# Patient Record
Sex: FEMALE | Race: WHITE | NOT HISPANIC OR LATINO | Employment: OTHER | URBAN - METROPOLITAN AREA
[De-identification: names, ages, dates, MRNs, and addresses within clinical notes are randomized per-mention and may not be internally consistent; named-entity substitution may affect disease eponyms.]

---

## 2024-03-08 PROBLEM — T46.2X1A HYPOTHYROIDISM DUE TO AMIODARONE: Status: ACTIVE | Noted: 2023-08-08

## 2024-03-25 ENCOUNTER — OFFICE VISIT (OUTPATIENT)
Age: 89
End: 2024-03-25
Payer: MEDICARE

## 2024-03-25 VITALS
HEART RATE: 54 BPM | HEIGHT: 64 IN | SYSTOLIC BLOOD PRESSURE: 136 MMHG | BODY MASS INDEX: 25.78 KG/M2 | WEIGHT: 151 LBS | DIASTOLIC BLOOD PRESSURE: 64 MMHG

## 2024-03-25 DIAGNOSIS — I35.0 NONRHEUMATIC AORTIC VALVE STENOSIS: Primary | ICD-10-CM

## 2024-03-25 PROCEDURE — 1159F MED LIST DOCD IN RCRD: CPT | Performed by: INTERNAL MEDICINE

## 2024-03-25 PROCEDURE — 1160F RVW MEDS BY RX/DR IN RCRD: CPT | Performed by: INTERNAL MEDICINE

## 2024-03-25 PROCEDURE — 99215 OFFICE O/P EST HI 40 MIN: CPT | Performed by: INTERNAL MEDICINE

## 2024-03-25 NOTE — H&P (VIEW-ONLY)
[facility] New Patient Office Note     Encounter Date:24  Patient:Brunilda Antony  :1934  MRN:[id number]    Referring Provider: Aaron Salinas MD    Consulted for: Aortic stenosis    Chief Complaint:   Chief Complaint   Patient presents with    TAVR Consult     History of Presenting Illness:      Ms. nAtony is a 89 y.o. woman with past medical history notable for nonrheumatic aortic valve stenosis, chronic systolic and diastolic congestive heart failure, paroxysmal atrial fibrillation not on anticoagulation due to bleeding risk, mixed hyperlipidemia, and pulmonary hypertension who presents to our office for initial valuation regarding her nonrheumatic aortic valve stenosis.  She has been followed closely for her valvular heart disease over the last number of years.  She noted to have moderate to severe aortic stenosis about a year ago but clinically was doing fairly well and actually had improvement in her LV function after addressing her heart rhythm issues.  Unfortunately over the last few months she has been having some progression of her congestive heart failure symptoms and did have to start on diuretic therapy.  She was having severe leg swelling and shortness of breath fortunately with just taking as needed Lasix she is doing better although still has fair amount of leg swelling.  Repeat echocardiogram did show progression of her valvular heart disease from moderate to severe to now clearly severe along with progression of pulmonary hypertension      Review of Systems:  Review of Systems   Constitutional: Positive for malaise/fatigue.   Cardiovascular:  Positive for chest pain, dyspnea on exertion and leg swelling.   Respiratory:  Positive for shortness of breath.        Current Outpatient Medications on File Prior to Visit   Medication Sig Dispense Refill    acetaminophen (TYLENOL) 325 MG tablet Take 2 tablets by mouth Every 4 (Four) Hours As Needed for Mild Pain. 30 tablet 0     amiodarone (PACERONE) 200 MG tablet Take 1 tablet by mouth Daily. 30 tablet 5    amLODIPine (NORVASC) 2.5 MG tablet Take 1 tablet by mouth Daily. 30 tablet 5    Arginine 500 MG capsule Take  by mouth.      bisacodyl (DULCOLAX) 10 MG suppository Insert 1 suppository into the rectum Daily As Needed for Constipation.      Blood Glucose Monitoring Suppl (ACCU-CHEK HU PLUS) w/Device kit Use to check glucose once daily . DM2/E11.9 1 kit 0    castor oil-balsam peru (VENELEX) ointment Apply 1 Application topically to the appropriate area as directed 2 (Two) Times a Day.      empagliflozin (Jardiance) 10 MG tablet tablet Take 1 tablet by mouth Daily. 30 tablet 11    famotidine (PEPCID) 10 MG tablet Take 1 tablet by mouth 2 (Two) Times a Day As Needed for Heartburn.      ferrous gluconate (FERGON) 324 MG tablet Take 1 tablet by mouth 5(five) times a week with breakfast. 20 tablet 5    glucose blood (Accu-Chek Hu Plus) test strip Use to check glucose once daily . DM/ e11.9 100 each 11    insulin lispro (HUMALOG/ADMELOG) 100 UNIT/ML injection Inject 2-7 Units under the skin into the appropriate area as directed 4 (Four) Times a Day Before Meals & at Bedtime.      Lancets (ACCU-CHEK MULTICLIX) lancets Use to check glucose once daily .  DM/ e11.9 100 each 12    levothyroxine (SYNTHROID, LEVOTHROID) 88 MCG tablet Take 1 tablet by mouth Every Morning. 30 tablet 3    losartan (COZAAR) 25 MG tablet Take 1 tablet by mouth 2 (Two) Times a Day. 60 tablet 5    metoprolol succinate XL (TOPROL-XL) 25 MG 24 hr tablet Take 0.5 tablets by mouth Daily. 15 tablet 5    multivitamin with minerals tablet tablet Take 1 tablet by mouth Daily.      ondansetron ODT (ZOFRAN-ODT) 4 MG disintegrating tablet Place 1 tablet on the tongue Every 8 (Eight) Hours As Needed for Nausea or Vomiting.      pantoprazole (PROTONIX) 40 MG EC tablet Take 1 tablet by mouth 2 (Two) Times a Day Before Meals. 60 tablet 5    rOPINIRole (REQUIP) 0.25 MG tablet Take 1  tablet by mouth Every Night. Take 1 hour before bedtime. 30 tablet 5    rosuvastatin (CRESTOR) 10 MG tablet Take 1 tablet by mouth Every Night. 30 tablet 5    vitamin B-12 (CYANOCOBALAMIN) 1000 MCG tablet Take 1 tablet by mouth Daily. 30 tablet 5    vitamin D (ERGOCALCIFEROL) 1.25 MG (59971 UT) capsule capsule Take 1 capsule by mouth 1 (One) Time Per Week. 5 capsule 3     No current facility-administered medications on file prior to visit.       Allergies   Allergen Reactions    Atorvastatin Calcium Myalgia    Digoxin Other (See Comments)     Digoxin toxicity ?unknown cause       Past Medical History:   Diagnosis Date    Arthritis     Atrial fibrillation     CHF (congestive heart failure)     Diabetes mellitus     Disease of thyroid gland     Heart valve disease 8-1-2023    Hyperlipidemia     Hypertension     Multiple falls     Open wound     left lower leg and top of left foot    Orthopnea     Pneumonia of right upper lobe due to infectious organism 12/16/2022    Restless legs syndrome 05/12/2016    Syncope     Wound infection     onm IVAB       Past Surgical History:   Procedure Laterality Date    ANKLE OPEN REDUCTION INTERNAL FIXATION Left 08/27/2023    Procedure: LEFT ANKLE OPEN REDUCTION INTERNAL FIXATION;  Surgeon: Tonny Walter II, MD;  Location: [facility] OR;  Service: Orthopedics;  Laterality: Left;    CATARACT EXTRACTION EXTRACAPSULAR W/ INTRAOCULAR LENS IMPLANTATION Bilateral     CHOLECYSTECTOMY WITH INTRAOPERATIVE CHOLANGIOGRAM N/A 08/11/2023    Procedure: CHOLECYSTECTOMY LAPAROSCOPIC INTRAOPERATIVE CHOLANGIOGRAM;  Surgeon: Lorena Olivares MD;  Location: [facility] OR;  Service: General;  Laterality: N/A;    COLONOSCOPY N/A 08/26/2023    Procedure: COLONOSCOPY TO CECUM/TI;  Surgeon: Juan M Mckoy MD;  Location: [facility] ENDOSCOPY;  Service: Gastroenterology;  Laterality: N/A;  pre: ANEMIA, GI BLEED  post: FAIR PREP, HEMORRHOIDS    ENDOSCOPY N/A 08/25/2023    Procedure:  ESOPHAGOGASTRODUODENOSCOPY;  Surgeon: Orlando Pang MD;  Location: [facility] ENDOSCOPY;  Service: Gastroenterology;  Laterality: N/A;  Pre - GI Bleed    ERCP N/A 2023    Procedure: ENDOSCOPIC RETROGRADE CHOLANGIOPANCREATOGRAPHY with sphincterotomy and balloon sweep;  Surgeon: Orlando Huang MD;  Location: [facility] ENDOSCOPY;  Service: Gastroenterology;  Laterality: N/A;  PRE/POST - cbd stones    HARDWARE REMOVAL Left 10/02/2023    Procedure: LEFT ANKLE HARDWARE REMOVAL;  Surgeon: Tonny Walter II, MD;  Location:  [facility] [place];  Service: Orthopedics;  Laterality: Left;    KNEE ARTHROSCOPY Right     SKIN CANCER EXCISION Left 2019    Left Malar       Social History     Socioeconomic History    Marital status:      Spouse name: Neel    Number of children: 4   Tobacco Use    Smoking status: Former     Current packs/day: 0.00     Types: Cigarettes     Start date: 1946     Quit date: 1961     Years since quittin.9    Smokeless tobacco: Never    Tobacco comments:     NO Caffeine use   Vaping Use    Vaping status: Never Used   Substance and Sexual Activity    Alcohol use: Yes     Comment: 3 yearly    Drug use: No    Sexual activity: Defer       Family History   Problem Relation Age of Onset    Hypertension Mother     Hypertension Father     Heart attack Father 55         at 55    Hyperlipidemia Father     Hyperlipidemia Sister     Hypertension Sister     Leukemia Sister     Coronary artery disease Brother         s/p cabg    Hypertension Brother     Breast cancer Daughter     Ovarian cancer Daughter     Colon cancer Neg Hx     Malig Hyperthermia Neg Hx        The following portions of the patient's history were reviewed and updated as appropriate: allergies, current medications, past family history, past medical history, past social history, past surgical history and problem list.       Objective:       Vitals:    24 0944   BP: 136/64   BP Location: Left arm   Patient  "Position: Sitting   Pulse: 54   Weight: 68.5 kg (151 lb)   Height: 162.6 cm (64.02\")       Body mass index is 25.9 kg/m².    Physical Exam:  Constitutional: Well appearing, Frail, No acute distress using walker to ambulate  HENT: Oropharynx clear and membrane moist  Eyes: Normal conjunctiva, no sclera icterus.  Neck: Supple, no carotid bruit bilaterally.  Cardiovascular: Regular rate and rhythm, Late peaking systolic murmur over the right upper sternal border, 1+ bilateral lower extremity edema.  Pulmonary: Normal respiratory effort, Normal lung sounds, no wheezing.  Neurological: Alert and orient x 3.   Skin: Warm, dry, no ecchymosis, no rash.  Psych: Appropriate mood and affect. Normal judgment and insight.      Lab Results   Component Value Date     02/26/2024     12/23/2023    K 3.8 02/26/2024    K 3.9 12/23/2023     02/26/2024    CL 99 12/23/2023    CO2 22.2 02/26/2024    CO2 27.2 12/23/2023    BUN 25 (H) 02/26/2024    BUN 42 (H) 12/23/2023    CREATININE 1.18 (H) 02/26/2024    CREATININE 0.90 12/23/2023    EGFRIFNONA 49 (L) 12/06/2021    EGFRIFNONA 52 (L) 09/09/2020    EGFRIFAFRI 57 (L) 12/06/2021    EGFRIFAFRI 62 09/09/2020    GLUCOSE 103 (H) 02/26/2024    GLUCOSE 163 (H) 12/23/2023    CALCIUM 8.5 (L) 02/26/2024    CALCIUM 8.5 (L) 12/23/2023    PROTENTOTREF 7.7 12/06/2021    PROTENTOTREF 6.5 09/09/2020    ALBUMIN 3.4 (L) 02/26/2024    ALBUMIN 2.8 (L) 12/22/2023    BILITOT 0.6 02/26/2024    BILITOT 0.5 12/22/2023    AST 18 02/26/2024    AST 16 12/22/2023    ALT 12 02/26/2024    ALT 12 12/22/2023     Lab Results   Component Value Date    WBC 7.83 02/26/2024    WBC 7.98 02/12/2024    HGB 9.1 (L) 02/26/2024    HGB 9.9 (L) 02/12/2024    HCT 29.5 (L) 02/26/2024    HCT 31.1 (L) 02/12/2024    MCV 85.8 02/26/2024    MCV 85.7 02/12/2024     02/26/2024     02/12/2024     Lab Results   Component Value Date    CHOL 157 06/20/2023    TRIG 114 06/20/2023    TRIG 75 12/06/2021    HDL 45 " 06/20/2023    HDL 49 12/06/2021    LDL 91 06/20/2023    LDL 50 12/06/2021     Lab Results   Component Value Date    PROBNP 1,486.0 02/26/2024    PROBNP 4,620.0 (H) 12/16/2023     Lab Results   Component Value Date    TROPONINT 45 (H) 12/16/2023     Lab Results   Component Value Date    TSH 1.800 02/26/2024    TSH 3.030 02/12/2024       ECG 12/16/2023 tracings reviewed by myself:  Sinus rhythm normal QRS    Echocardiogram 2/26/2024 with images reviewed by myself:  Left ventricular systolic function is normal. Calculated left ventricular EF = 65.3%  Left ventricular diastolic function is consistent with (grade III w/high LAP) reversible restrictive pattern.  The left atrial cavity is mildly dilated.  The right atrial cavity is mildly  dilated.  Severe aortic valve stenosis is present. Aortic valve area is 0.7 cm2.Peak velocity of the flow distal to the aortic valve is 414.6 cm/s. Aortic valve maximum pressure gradient is 69 mmHg. Aortic valve mean pressure gradient is 40 mmHg. Aortic valve dimensionless index is 0.3 .  Calculated right ventricular systolic pressure from tricuspid regurgitation is 66 mmHg.    Echocardiogram 12/18/2023 with images reviewed by myself:  Left ventricular systolic function is normal. Calculated left ventricular EF = 64.4%  Left ventricular diastolic function was normal.  The left atrial cavity is moderately dilated.  Moderate aortic valve stenosis is present. Aortic valve area is 0.6 cm2.  Peak velocity of the flow distal to the aortic valve is 341 cm/s. Aortic valve maximum pressure gradient is 47 mmHg. Aortic valve mean pressure gradient is 27 mmHg. Aortic valve dimensionless index is 0.3 .  Estimated right ventricular systolic pressure from tricuspid regurgitation is mildly elevated (35-45 mmHg). Calculated right ventricular systolic pressure from tricuspid regurgitation is 36 mmHg.    Echocardiogram 8/8/2023:  Left ventricular systolic function is normal. Calculated left ventricular EF  = 63.2%  Left ventricular diastolic function was normal.  There is calcification of the aortic valve mainly affecting the non-coronary, left coronary and right coronary cusp(s). There is thickening of the aortic valve. The aortic valve appears trileaflet. No significant aortic valve regurgitation is present. Moderate aortic valve stenosis is present. Aortic valve area is 0.87 cm2. Peak velocity of the flow distal to the aortic valve is 317.1 cm/s. Aortic valve maximum pressure gradient is 40.2 mmHg. Aortic valve mean  Calculated right ventricular systolic pressure from tricuspid regurgitation is 38 mmHg.    Echocardiogram 12/16/2022:  Left ventricular systolic function is low normal. Calculated left ventricular EF = 41.7% Left ventricular ejection fraction appears to be 41 - 45%. Normal left ventricular cavity size and wall thickness noted. There is left ventricular global hypokinesis noted. Left ventricular diastolic function was indeterminate. There is markedly abnormal septal motion that appears to correlate with respiration and suggests significant underlying lung disease vs diastolic volume overload.  The right ventricular cavity is moderately dilated. Mildly reduced right ventricular systolic function noted.  Moderate mitral valve regurgitation is present.  Moderate tricuspid valve regurgitation is present. Estimated right ventricular systolic pressure from tricuspid regurgitation is moderately elevated (45-55 mmHg). Calculated right ventricular systolic pressure from tricuspid regurgitation is 46 mmHg.        Assessment:          Diagnosis Plan   1. Nonrheumatic aortic valve stenosis  Case Request Cath Lab: Right and Left Heart Cath    CT Angio TAVR Chest Abdomen Pelvis    Ambulatory Referral to Cardiac Surgery             Plan:       Ms. Antony is a 89 y.o. woman with past medical history notable for nonrheumatic aortic valve stenosis, chronic systolic and diastolic congestive heart failure, paroxysmal atrial  fibrillation not on anticoagulation due to bleeding risk, mixed hyperlipidemia, and pulmonary hypertension who presents to our office for initial valuation regarding her nonrheumatic aortic valve stenosis.  She clearly has symptomatic aortic stenoses.  Fortunately with little diuretic her symptoms have been improving but based upon the progression seen on her echocardiogram more definitive answers would be aortic valve replacement.  Without treating her valve disease obviously symptomatic severe aortic stenosis is life-threatening.  Do need to proceed forward with further evaluation for aortic valve replacement given her age and comorbidities I think TAVR would likely be a better option we will also have our cardiac surgery colleagues weigh in.  Will start working on workup for TAVR with heart catheterization and TAVR CTA along with our referral to our surgical team.    Nonrheumatic aortic valve stenosis:  Plan on proceeding forward with TAVR workup with heart catheterization to help better define pulmonary hypertension and hemodynamics as well as CTA  Cardiac surgery referral  Continue as needed diuretics    Chronic systolic and diastolic congestive heart failure:  Seems to be stabilized with starting as needed Lasix  Continue with current medical regimen as directed by primary cardiologist    Pulmonary hypertension:  Likely related to underlying congestive heart failure but also possibly due to valvular heart disease continue diuretics      Follow-up:  After heart catheterization      Thank you for allowing me to participate in the care of Brunilda Antony. Feel free to contact me directly with any further questions or concerns.    Orlando Cash MD  [facility]  03/25/24  10:29 EDT         Shared decision Statement:  Aortic Stenosis education material has been provided to the patient. This included a printed brochure detailing pathophysiology of aortic stenosis, patient specific aortic stenosis  symptoms, and treatment options (medical therapy, surgical aortic valve replacement, and transcatheter aortic valve replacement).   We reviewed the Shared Decision Making Tool for treatment of Aortic Stenosis to compare/contrast the options of TAVR/SAVR/medical management. We discussed patient's goals of care:  Feel better. Patient has expressed following concerns regarding Aortic Stenosis and/or treatment options: Easy recovery  Patient has a Living Will: yes  Patient has a Power of : yes

## 2024-03-25 NOTE — PROGRESS NOTES
[facility] New Patient Office Note     Encounter Date:24  Patient:Brunilda Antony  :1934  MRN:[id number]    Referring Provider: Aaron Salinas MD    Consulted for: Aortic stenosis    Chief Complaint:   Chief Complaint   Patient presents with    TAVR Consult     History of Presenting Illness:      Ms. Antony is a 89 y.o. woman with past medical history notable for nonrheumatic aortic valve stenosis, chronic systolic and diastolic congestive heart failure, paroxysmal atrial fibrillation not on anticoagulation due to bleeding risk, mixed hyperlipidemia, and pulmonary hypertension who presents to our office for initial valuation regarding her nonrheumatic aortic valve stenosis.  She has been followed closely for her valvular heart disease over the last number of years.  She noted to have moderate to severe aortic stenosis about a year ago but clinically was doing fairly well and actually had improvement in her LV function after addressing her heart rhythm issues.  Unfortunately over the last few months she has been having some progression of her congestive heart failure symptoms and did have to start on diuretic therapy.  She was having severe leg swelling and shortness of breath fortunately with just taking as needed Lasix she is doing better although still has fair amount of leg swelling.  Repeat echocardiogram did show progression of her valvular heart disease from moderate to severe to now clearly severe along with progression of pulmonary hypertension      Review of Systems:  Review of Systems   Constitutional: Positive for malaise/fatigue.   Cardiovascular:  Positive for chest pain, dyspnea on exertion and leg swelling.   Respiratory:  Positive for shortness of breath.        Current Outpatient Medications on File Prior to Visit   Medication Sig Dispense Refill    acetaminophen (TYLENOL) 325 MG tablet Take 2 tablets by mouth Every 4 (Four) Hours As Needed for Mild Pain. 30 tablet 0     amiodarone (PACERONE) 200 MG tablet Take 1 tablet by mouth Daily. 30 tablet 5    amLODIPine (NORVASC) 2.5 MG tablet Take 1 tablet by mouth Daily. 30 tablet 5    Arginine 500 MG capsule Take  by mouth.      bisacodyl (DULCOLAX) 10 MG suppository Insert 1 suppository into the rectum Daily As Needed for Constipation.      Blood Glucose Monitoring Suppl (ACCU-CHEK HU PLUS) w/Device kit Use to check glucose once daily . DM2/E11.9 1 kit 0    castor oil-balsam peru (VENELEX) ointment Apply 1 Application topically to the appropriate area as directed 2 (Two) Times a Day.      empagliflozin (Jardiance) 10 MG tablet tablet Take 1 tablet by mouth Daily. 30 tablet 11    famotidine (PEPCID) 10 MG tablet Take 1 tablet by mouth 2 (Two) Times a Day As Needed for Heartburn.      ferrous gluconate (FERGON) 324 MG tablet Take 1 tablet by mouth 5(five) times a week with breakfast. 20 tablet 5    glucose blood (Accu-Chek Hu Plus) test strip Use to check glucose once daily . DM/ e11.9 100 each 11    insulin lispro (HUMALOG/ADMELOG) 100 UNIT/ML injection Inject 2-7 Units under the skin into the appropriate area as directed 4 (Four) Times a Day Before Meals & at Bedtime.      Lancets (ACCU-CHEK MULTICLIX) lancets Use to check glucose once daily .  DM/ e11.9 100 each 12    levothyroxine (SYNTHROID, LEVOTHROID) 88 MCG tablet Take 1 tablet by mouth Every Morning. 30 tablet 3    losartan (COZAAR) 25 MG tablet Take 1 tablet by mouth 2 (Two) Times a Day. 60 tablet 5    metoprolol succinate XL (TOPROL-XL) 25 MG 24 hr tablet Take 0.5 tablets by mouth Daily. 15 tablet 5    multivitamin with minerals tablet tablet Take 1 tablet by mouth Daily.      ondansetron ODT (ZOFRAN-ODT) 4 MG disintegrating tablet Place 1 tablet on the tongue Every 8 (Eight) Hours As Needed for Nausea or Vomiting.      pantoprazole (PROTONIX) 40 MG EC tablet Take 1 tablet by mouth 2 (Two) Times a Day Before Meals. 60 tablet 5    rOPINIRole (REQUIP) 0.25 MG tablet Take 1  tablet by mouth Every Night. Take 1 hour before bedtime. 30 tablet 5    rosuvastatin (CRESTOR) 10 MG tablet Take 1 tablet by mouth Every Night. 30 tablet 5    vitamin B-12 (CYANOCOBALAMIN) 1000 MCG tablet Take 1 tablet by mouth Daily. 30 tablet 5    vitamin D (ERGOCALCIFEROL) 1.25 MG (35956 UT) capsule capsule Take 1 capsule by mouth 1 (One) Time Per Week. 5 capsule 3     No current facility-administered medications on file prior to visit.       Allergies   Allergen Reactions    Atorvastatin Calcium Myalgia    Digoxin Other (See Comments)     Digoxin toxicity ?unknown cause       Past Medical History:   Diagnosis Date    Arthritis     Atrial fibrillation     CHF (congestive heart failure)     Diabetes mellitus     Disease of thyroid gland     Heart valve disease 8-1-2023    Hyperlipidemia     Hypertension     Multiple falls     Open wound     left lower leg and top of left foot    Orthopnea     Pneumonia of right upper lobe due to infectious organism 12/16/2022    Restless legs syndrome 05/12/2016    Syncope     Wound infection     onm IVAB       Past Surgical History:   Procedure Laterality Date    ANKLE OPEN REDUCTION INTERNAL FIXATION Left 08/27/2023    Procedure: LEFT ANKLE OPEN REDUCTION INTERNAL FIXATION;  Surgeon: Tonny Walter II, MD;  Location: [facility] OR;  Service: Orthopedics;  Laterality: Left;    CATARACT EXTRACTION EXTRACAPSULAR W/ INTRAOCULAR LENS IMPLANTATION Bilateral     CHOLECYSTECTOMY WITH INTRAOPERATIVE CHOLANGIOGRAM N/A 08/11/2023    Procedure: CHOLECYSTECTOMY LAPAROSCOPIC INTRAOPERATIVE CHOLANGIOGRAM;  Surgeon: Lorena Olivares MD;  Location: [facility] OR;  Service: General;  Laterality: N/A;    COLONOSCOPY N/A 08/26/2023    Procedure: COLONOSCOPY TO CECUM/TI;  Surgeon: Juan M Mckoy MD;  Location: [facility] ENDOSCOPY;  Service: Gastroenterology;  Laterality: N/A;  pre: ANEMIA, GI BLEED  post: FAIR PREP, HEMORRHOIDS    ENDOSCOPY N/A 08/25/2023    Procedure:  ESOPHAGOGASTRODUODENOSCOPY;  Surgeon: Orlando Pang MD;  Location: [facility] ENDOSCOPY;  Service: Gastroenterology;  Laterality: N/A;  Pre - GI Bleed    ERCP N/A 2023    Procedure: ENDOSCOPIC RETROGRADE CHOLANGIOPANCREATOGRAPHY with sphincterotomy and balloon sweep;  Surgeon: Orlando Huang MD;  Location: [facility] ENDOSCOPY;  Service: Gastroenterology;  Laterality: N/A;  PRE/POST - cbd stones    HARDWARE REMOVAL Left 10/02/2023    Procedure: LEFT ANKLE HARDWARE REMOVAL;  Surgeon: Tonny Walter II, MD;  [place]  [facility];  Service: Orthopedics;  Laterality: Left;    KNEE ARTHROSCOPY Right     SKIN CANCER EXCISION Left 2019    Left Malar       Social History     Socioeconomic History    Marital status:      Spouse name: Neel    Number of children: 4   Tobacco Use    Smoking status: Former     Current packs/day: 0.00     Types: Cigarettes     Start date: 1946     Quit date: 1961     Years since quittin.9    Smokeless tobacco: Never    Tobacco comments:     NO Caffeine use   Vaping Use    Vaping status: Never Used   Substance and Sexual Activity    Alcohol use: Yes     Comment: 3 yearly    Drug use: No    Sexual activity: Defer       Family History   Problem Relation Age of Onset    Hypertension Mother     Hypertension Father     Heart attack Father 55         at 55    Hyperlipidemia Father     Hyperlipidemia Sister     Hypertension Sister     Leukemia Sister     Coronary artery disease Brother         s/p cabg    Hypertension Brother     Breast cancer Daughter     Ovarian cancer Daughter     Colon cancer Neg Hx     Malig Hyperthermia Neg Hx        The following portions of the patient's history were reviewed and updated as appropriate: allergies, current medications, past family history, past medical history, past social history, past surgical history and problem list.       Objective:       Vitals:    24 0944   BP: 136/64   BP Location: Left arm   Patient  "Position: Sitting   Pulse: 54   Weight: 68.5 kg (151 lb)   Height: 162.6 cm (64.02\")       Body mass index is 25.9 kg/m².    Physical Exam:  Constitutional: Well appearing, Frail, No acute distress using walker to ambulate  HENT: Oropharynx clear and membrane moist  Eyes: Normal conjunctiva, no sclera icterus.  Neck: Supple, no carotid bruit bilaterally.  Cardiovascular: Regular rate and rhythm, Late peaking systolic murmur over the right upper sternal border, 1+ bilateral lower extremity edema.  Pulmonary: Normal respiratory effort, Normal lung sounds, no wheezing.  Neurological: Alert and orient x 3.   Skin: Warm, dry, no ecchymosis, no rash.  Psych: Appropriate mood and affect. Normal judgment and insight.      Lab Results   Component Value Date     02/26/2024     12/23/2023    K 3.8 02/26/2024    K 3.9 12/23/2023     02/26/2024    CL 99 12/23/2023    CO2 22.2 02/26/2024    CO2 27.2 12/23/2023    BUN 25 (H) 02/26/2024    BUN 42 (H) 12/23/2023    CREATININE 1.18 (H) 02/26/2024    CREATININE 0.90 12/23/2023    EGFRIFNONA 49 (L) 12/06/2021    EGFRIFNONA 52 (L) 09/09/2020    EGFRIFAFRI 57 (L) 12/06/2021    EGFRIFAFRI 62 09/09/2020    GLUCOSE 103 (H) 02/26/2024    GLUCOSE 163 (H) 12/23/2023    CALCIUM 8.5 (L) 02/26/2024    CALCIUM 8.5 (L) 12/23/2023    PROTENTOTREF 7.7 12/06/2021    PROTENTOTREF 6.5 09/09/2020    ALBUMIN 3.4 (L) 02/26/2024    ALBUMIN 2.8 (L) 12/22/2023    BILITOT 0.6 02/26/2024    BILITOT 0.5 12/22/2023    AST 18 02/26/2024    AST 16 12/22/2023    ALT 12 02/26/2024    ALT 12 12/22/2023     Lab Results   Component Value Date    WBC 7.83 02/26/2024    WBC 7.98 02/12/2024    HGB 9.1 (L) 02/26/2024    HGB 9.9 (L) 02/12/2024    HCT 29.5 (L) 02/26/2024    HCT 31.1 (L) 02/12/2024    MCV 85.8 02/26/2024    MCV 85.7 02/12/2024     02/26/2024     02/12/2024     Lab Results   Component Value Date    CHOL 157 06/20/2023    TRIG 114 06/20/2023    TRIG 75 12/06/2021    HDL 45 " 06/20/2023    HDL 49 12/06/2021    LDL 91 06/20/2023    LDL 50 12/06/2021     Lab Results   Component Value Date    PROBNP 1,486.0 02/26/2024    PROBNP 4,620.0 (H) 12/16/2023     Lab Results   Component Value Date    TROPONINT 45 (H) 12/16/2023     Lab Results   Component Value Date    TSH 1.800 02/26/2024    TSH 3.030 02/12/2024       ECG 12/16/2023 tracings reviewed by myself:  Sinus rhythm normal QRS    Echocardiogram 2/26/2024 with images reviewed by myself:  Left ventricular systolic function is normal. Calculated left ventricular EF = 65.3%  Left ventricular diastolic function is consistent with (grade III w/high LAP) reversible restrictive pattern.  The left atrial cavity is mildly dilated.  The right atrial cavity is mildly  dilated.  Severe aortic valve stenosis is present. Aortic valve area is 0.7 cm2.Peak velocity of the flow distal to the aortic valve is 414.6 cm/s. Aortic valve maximum pressure gradient is 69 mmHg. Aortic valve mean pressure gradient is 40 mmHg. Aortic valve dimensionless index is 0.3 .  Calculated right ventricular systolic pressure from tricuspid regurgitation is 66 mmHg.    Echocardiogram 12/18/2023 with images reviewed by myself:  Left ventricular systolic function is normal. Calculated left ventricular EF = 64.4%  Left ventricular diastolic function was normal.  The left atrial cavity is moderately dilated.  Moderate aortic valve stenosis is present. Aortic valve area is 0.6 cm2.  Peak velocity of the flow distal to the aortic valve is 341 cm/s. Aortic valve maximum pressure gradient is 47 mmHg. Aortic valve mean pressure gradient is 27 mmHg. Aortic valve dimensionless index is 0.3 .  Estimated right ventricular systolic pressure from tricuspid regurgitation is mildly elevated (35-45 mmHg). Calculated right ventricular systolic pressure from tricuspid regurgitation is 36 mmHg.    Echocardiogram 8/8/2023:  Left ventricular systolic function is normal. Calculated left ventricular EF  = 63.2%  Left ventricular diastolic function was normal.  There is calcification of the aortic valve mainly affecting the non-coronary, left coronary and right coronary cusp(s). There is thickening of the aortic valve. The aortic valve appears trileaflet. No significant aortic valve regurgitation is present. Moderate aortic valve stenosis is present. Aortic valve area is 0.87 cm2. Peak velocity of the flow distal to the aortic valve is 317.1 cm/s. Aortic valve maximum pressure gradient is 40.2 mmHg. Aortic valve mean  Calculated right ventricular systolic pressure from tricuspid regurgitation is 38 mmHg.    Echocardiogram 12/16/2022:  Left ventricular systolic function is low normal. Calculated left ventricular EF = 41.7% Left ventricular ejection fraction appears to be 41 - 45%. Normal left ventricular cavity size and wall thickness noted. There is left ventricular global hypokinesis noted. Left ventricular diastolic function was indeterminate. There is markedly abnormal septal motion that appears to correlate with respiration and suggests significant underlying lung disease vs diastolic volume overload.  The right ventricular cavity is moderately dilated. Mildly reduced right ventricular systolic function noted.  Moderate mitral valve regurgitation is present.  Moderate tricuspid valve regurgitation is present. Estimated right ventricular systolic pressure from tricuspid regurgitation is moderately elevated (45-55 mmHg). Calculated right ventricular systolic pressure from tricuspid regurgitation is 46 mmHg.        Assessment:          Diagnosis Plan   1. Nonrheumatic aortic valve stenosis  Case Request Cath Lab: Right and Left Heart Cath    CT Angio TAVR Chest Abdomen Pelvis    Ambulatory Referral to Cardiac Surgery             Plan:       Ms. Antony is a 89 y.o. woman with past medical history notable for nonrheumatic aortic valve stenosis, chronic systolic and diastolic congestive heart failure, paroxysmal atrial  fibrillation not on anticoagulation due to bleeding risk, mixed hyperlipidemia, and pulmonary hypertension who presents to our office for initial valuation regarding her nonrheumatic aortic valve stenosis.  She clearly has symptomatic aortic stenoses.  Fortunately with little diuretic her symptoms have been improving but based upon the progression seen on her echocardiogram more definitive answers would be aortic valve replacement.  Without treating her valve disease obviously symptomatic severe aortic stenosis is life-threatening.  Do need to proceed forward with further evaluation for aortic valve replacement given her age and comorbidities I think TAVR would likely be a better option we will also have our cardiac surgery colleagues weigh in.  Will start working on workup for TAVR with heart catheterization and TAVR CTA along with our referral to our surgical team.    Nonrheumatic aortic valve stenosis:  Plan on proceeding forward with TAVR workup with heart catheterization to help better define pulmonary hypertension and hemodynamics as well as CTA  Cardiac surgery referral  Continue as needed diuretics    Chronic systolic and diastolic congestive heart failure:  Seems to be stabilized with starting as needed Lasix  Continue with current medical regimen as directed by primary cardiologist    Pulmonary hypertension:  Likely related to underlying congestive heart failure but also possibly due to valvular heart disease continue diuretics      Follow-up:  After heart catheterization      Thank you for allowing me to participate in the care of Brunilda Antony. Feel free to contact me directly with any further questions or concerns.    Orlando Cash MD  [facility]  03/25/24  10:29 EDT         Shared decision Statement:  Aortic Stenosis education material has been provided to the patient. This included a printed brochure detailing pathophysiology of aortic stenosis, patient specific aortic stenosis  symptoms, and treatment options (medical therapy, surgical aortic valve replacement, and transcatheter aortic valve replacement).   We reviewed the Shared Decision Making Tool for treatment of Aortic Stenosis to compare/contrast the options of TAVR/SAVR/medical management. We discussed patient's goals of care:  Feel better. Patient has expressed following concerns regarding Aortic Stenosis and/or treatment options: Easy recovery  Patient has a Living Will: yes  Patient has a Power of : yes

## 2024-03-29 ENCOUNTER — HOSPITAL ENCOUNTER (OUTPATIENT)
Facility: HOSPITAL | Age: 89
Setting detail: HOSPITAL OUTPATIENT SURGERY
Discharge: HOME | End: 2024-03-29
Attending: INTERNAL MEDICINE | Admitting: INTERNAL MEDICINE
Payer: MEDICARE

## 2024-03-29 VITALS
SYSTOLIC BLOOD PRESSURE: 119 MMHG | TEMPERATURE: 97 F | WEIGHT: 150.3 LBS | HEIGHT: 64 IN | BODY MASS INDEX: 25.66 KG/M2 | RESPIRATION RATE: 16 BRPM | DIASTOLIC BLOOD PRESSURE: 62 MMHG | HEART RATE: 52 BPM | OXYGEN SATURATION: 96 %

## 2024-03-29 DIAGNOSIS — I35.0 NONRHEUMATIC AORTIC VALVE STENOSIS: ICD-10-CM

## 2024-03-29 LAB
GLUCOSE BLDC GLUCOMTR-MCNC: 84 MG/DL (ref 70–130)
GLUCOSE BLDC GLUCOMTR-MCNC: 91 MG/DL (ref 70–130)

## 2024-03-29 PROCEDURE — 82810 BLOOD GASES O2 SAT ONLY: CPT

## 2024-03-29 PROCEDURE — 25810000003 SODIUM CHLORIDE 0.9 % SOLUTION: Performed by: INTERNAL MEDICINE

## 2024-03-29 PROCEDURE — C1894 INTRO/SHEATH, NON-LASER: HCPCS | Performed by: INTERNAL MEDICINE

## 2024-03-29 PROCEDURE — 82948 REAGENT STRIP/BLOOD GLUCOSE: CPT

## 2024-03-29 PROCEDURE — 85018 HEMOGLOBIN: CPT

## 2024-03-29 PROCEDURE — C1769 GUIDE WIRE: HCPCS | Performed by: INTERNAL MEDICINE

## 2024-03-29 PROCEDURE — 85014 HEMATOCRIT: CPT

## 2024-03-29 PROCEDURE — 25010000002 MIDAZOLAM PER 1 MG: Performed by: INTERNAL MEDICINE

## 2024-03-29 PROCEDURE — 25510000001 IOPAMIDOL PER 1 ML: Performed by: INTERNAL MEDICINE

## 2024-03-29 PROCEDURE — 93460 R&L HRT ART/VENTRICLE ANGIO: CPT | Performed by: INTERNAL MEDICINE

## 2024-03-29 RX ORDER — SODIUM CHLORIDE 9 MG/ML
75 INJECTION, SOLUTION INTRAVENOUS CONTINUOUS
Status: DISCONTINUED | OUTPATIENT
Start: 2024-03-29 | End: 2024-04-02 | Stop reason: HOSPADM

## 2024-03-29 RX ORDER — SODIUM CHLORIDE 9 MG/ML
40 INJECTION, SOLUTION INTRAVENOUS AS NEEDED
Status: DISCONTINUED | OUTPATIENT
Start: 2024-03-29 | End: 2024-04-02 | Stop reason: HOSPADM

## 2024-03-29 RX ORDER — LIDOCAINE HYDROCHLORIDE 20 MG/ML
INJECTION, SOLUTION INFILTRATION; PERINEURAL
Status: DISCONTINUED | OUTPATIENT
Start: 2024-03-29 | End: 2024-03-29 | Stop reason: HOSPADM

## 2024-03-29 RX ORDER — SODIUM CHLORIDE 9 MG/ML
40 INJECTION, SOLUTION INTRAVENOUS AS NEEDED
Status: DISCONTINUED | OUTPATIENT
Start: 2024-03-29 | End: 2024-03-29 | Stop reason: HOSPADM

## 2024-03-29 RX ORDER — SODIUM CHLORIDE 9 MG/ML
100 INJECTION, SOLUTION INTRAVENOUS CONTINUOUS
Status: ACTIVE | OUTPATIENT
Start: 2024-03-29 | End: 2024-03-29

## 2024-03-29 RX ORDER — ACETAMINOPHEN 325 MG/1
650 TABLET ORAL EVERY 4 HOURS PRN
Status: DISCONTINUED | OUTPATIENT
Start: 2024-03-29 | End: 2024-04-02 | Stop reason: HOSPADM

## 2024-03-29 RX ORDER — MIDAZOLAM HYDROCHLORIDE 1 MG/ML
INJECTION INTRAMUSCULAR; INTRAVENOUS
Status: DISCONTINUED | OUTPATIENT
Start: 2024-03-29 | End: 2024-03-29 | Stop reason: HOSPADM

## 2024-03-29 RX ORDER — SODIUM CHLORIDE 0.9 % (FLUSH) 0.9 %
10 SYRINGE (ML) INJECTION AS NEEDED
Status: DISCONTINUED | OUTPATIENT
Start: 2024-03-29 | End: 2024-03-29 | Stop reason: HOSPADM

## 2024-03-29 RX ORDER — SODIUM CHLORIDE 0.9 % (FLUSH) 0.9 %
10 SYRINGE (ML) INJECTION EVERY 12 HOURS SCHEDULED
Status: DISCONTINUED | OUTPATIENT
Start: 2024-03-29 | End: 2024-03-29 | Stop reason: HOSPADM

## 2024-03-29 RX ORDER — SODIUM CHLORIDE 0.9 % (FLUSH) 0.9 %
10 SYRINGE (ML) INJECTION AS NEEDED
Status: DISCONTINUED | OUTPATIENT
Start: 2024-03-29 | End: 2024-04-02 | Stop reason: HOSPADM

## 2024-03-29 RX ORDER — SODIUM CHLORIDE 0.9 % (FLUSH) 0.9 %
3 SYRINGE (ML) INJECTION EVERY 12 HOURS SCHEDULED
Status: DISCONTINUED | OUTPATIENT
Start: 2024-03-29 | End: 2024-04-02 | Stop reason: HOSPADM

## 2024-03-29 RX ADMIN — SODIUM CHLORIDE 75 ML/HR: 9 INJECTION, SOLUTION INTRAVENOUS at 09:57

## 2024-03-29 NOTE — DISCHARGE INSTRUCTIONS
[facility]  [address] [place] [address]    Coronary Angiogram (Radial/Ulnar Approach) After Care    Refer to this sheet in the next few weeks. These instructions provide you with information on caring for yourself after your procedure. Your caregiver may also give you more specific instructions. Your treatment has been planned according to current medical practices, but problems sometimes occur. Call your caregiver if you have any problems or questions after your procedure.    Home Care Instructions:  You may shower the day after the procedure. Remove the bandage (dressing) and gently wash the site with plain soap and water. Gently pat the site dry. You may apply a band aid daily for 2 days if desired.    Do not apply powder or lotion to the site.  Do not submerge the affected site in water for 3 to 5 days or until the site is completely healed.   Do not lift, push or pull anything over 5 pounds for 5 days after your procedure or as directed by your physician.  As a reference, a gallon of milk weighs 8 pounds.   Inspect the site at least twice daily. You may notice some bruising at the site and it may be tender for 1 to 2 weeks.     Increase your fluid intake for the next 2 days.    Keep arm elevated for 24 hours. For the remainder of the day, keep your arm in “Pledge of Allegiance” position when up and about.     You may drive 24 hours after the procedure unless otherwise instructed by your caregiver.  Do not operate machinery or power tools for 24 hours.  A responsible adult should be with you for the first 24 hours after you arrive home. Do not make any important legal decisions or sign legal papers for 24 hours.  Do not drink alcohol for 24 hours.    Metformin or any medications containing Metformin should not be taken for 48 hours after your procedure.      Call Your Doctor if:   You have unusual pain at the radial/ulnar (wrist) site.  You have redness, warmth, swelling, or pain at the  radial/ulnar (wrist) site.  You have drainage (other than a small amount of blood on the dressing).  `You have chills or a fever > 101.  Your arm becomes pale or dark, cool, tingly, or numb.  You develop chest pain, shortness of breath, feel faint or pass out.    You have heavy bleeding from the site, hold pressure on the site for 20 minutes.  If the bleeding stops, apply a fresh bandage and call your cardiologist.  However, if you        continue to have bleeding, call 911 and continue to apply pressure to the site.   You have any symptoms of a stroke.  Remember BE FAST  B-balance. Sudden trouble walking or loss of balance.  E-eyes.  Sudden changes in how you see or a sudden onset of a very bad headache.   F-face. Sudden weakness or loss of feeling of the face or facial droop on one side.   A-arms Sudden weakness or numbness in one arm.  One arm drifts down if they are both held out in front of you. This happens suddenly and usually on one side of the body.   S-speech.  Sudden trouble speaking, slurred speech or trouble understanding what are saying.   T-time  Time to call emergency services.  Write down the symptoms and the time they started.

## 2024-03-29 NOTE — Clinical Note
Prepped: right groin, right brachial and Right Wrist. Prepped with: Hibiclens. The site was clipped. The patient was draped in a sterile fashion.

## 2024-03-29 NOTE — Clinical Note
Hemostasis started on the right brachial vein. Manual pressure applied to vessel. Manual pressure was held by . Manual pressure was held for 5 min. Hemostasis achieved successfully.

## 2024-04-02 LAB
HCT VFR BLDA CALC: 33 % (ref 38–51)
HCT VFR BLDA CALC: 33 % (ref 38–51)
HGB BLDA-MCNC: 11.2 G/DL (ref 12–17)
HGB BLDA-MCNC: 11.2 G/DL (ref 12–17)
SAO2 % BLDA: 69 % (ref 95–98)
SAO2 % BLDA: 93 % (ref 95–98)

## 2024-04-03 ENCOUNTER — OFFICE VISIT (OUTPATIENT)
Dept: CARDIAC SURGERY | Facility: CLINIC | Age: 89
End: 2024-04-03
Payer: MEDICARE

## 2024-04-03 VITALS
WEIGHT: 148 LBS | OXYGEN SATURATION: 95 % | TEMPERATURE: 97.3 F | BODY MASS INDEX: 25.27 KG/M2 | DIASTOLIC BLOOD PRESSURE: 77 MMHG | RESPIRATION RATE: 18 BRPM | HEART RATE: 50 BPM | HEIGHT: 64 IN | SYSTOLIC BLOOD PRESSURE: 160 MMHG

## 2024-04-03 DIAGNOSIS — I35.0 SEVERE AORTIC VALVE STENOSIS: Primary | ICD-10-CM

## 2024-04-03 NOTE — PROGRESS NOTES
"Chief Complaint  Aortic Stenosis    Subjective        Briseida Antony presents to [facility] CARDIAC SURGERY  History of Present Illness  89 years old female with chronic systolic and diastolic congestive heart failure, paroxysmal atrial fibrillation not on any anticoagulation due to bleeding risk, hyperlipidemia, pulmonary hypertension, who comes for evaluation about her aortic valve stenosis.  She has shortness of breath with exertion without any chest pain or dizziness.  Over the last few months she has been having progression of her congestive heart failure symptoms with leg swelling and shortness of breath.  Last echocardiogram showed low-flow low gradient severe aortic valve stenosis with an aortic valve area of 0.6 cm2.  Cardiac cath showed single-vessel LAD disease.      Objective   Vital Signs:  /77 (BP Location: Left arm, Patient Position: Sitting, Cuff Size: Adult)   Pulse 50   Temp 97.3 °F (36.3 °C)   Resp 18   Ht 162.6 cm (64\")   Wt 67.1 kg (148 lb)   SpO2 95%   BMI 25.40 kg/m²   Estimated body mass index is 25.4 kg/m² as calculated from the following:    Height as of this encounter: 162.6 cm (64\").    Weight as of this encounter: 67.1 kg (148 lb).               Physical Exam  Constitutional:       Appearance: Normal appearance. She is normal weight.   Cardiovascular:      Rate and Rhythm: Normal rate and regular rhythm.      Heart sounds: Murmur heard.   Pulmonary:      Effort: Pulmonary effort is normal.      Breath sounds: Normal breath sounds.   Neurological:      General: No focal deficit present.      Mental Status: She is alert and oriented to person, place, and time. Mental status is at baseline.   Psychiatric:         Mood and Affect: Mood normal.         Behavior: Behavior normal.         Thought Content: Thought content normal.         Judgment: Judgment normal.        Result Review :                     Assessment and Plan     There are no diagnoses linked to this " encounter.    -Severe symptomatic aortic valve stenosis.  For TAVR  -Coronary disease.  Asymptomatic.  Medical treatment    89 years old female with severe symptomatic aortic valve stenosis.  I think that her aortic valve needs to be treated in order to improve symptoms and prolong life.  Due to age and comorbidities the patient is prohibitive risk for open heart surgery so I will recommend TAVR.  I explained risk and benefits of the procedure to the patient and family and she agreed to proceed.  She will be an open rescue if needed         Follow Up     No follow-ups on file.  Patient was given instructions and counseling regarding her condition or for health maintenance advice. Please see specific information pulled into the AVS if appropriate.

## 2024-04-03 NOTE — H&P (VIEW-ONLY)
"Chief Complaint  Aortic Stenosis    Subjective        Briseida Antony presents to [facility] CARDIAC SURGERY  History of Present Illness  89 years old female with chronic systolic and diastolic congestive heart failure, paroxysmal atrial fibrillation not on any anticoagulation due to bleeding risk, hyperlipidemia, pulmonary hypertension, who comes for evaluation about her aortic valve stenosis.  She has shortness of breath with exertion without any chest pain or dizziness.  Over the last few months she has been having progression of her congestive heart failure symptoms with leg swelling and shortness of breath.  Last echocardiogram showed low-flow low gradient severe aortic valve stenosis with an aortic valve area of 0.6 cm2.  Cardiac cath showed single-vessel LAD disease.      Objective   Vital Signs:  /77 (BP Location: Left arm, Patient Position: Sitting, Cuff Size: Adult)   Pulse 50   Temp 97.3 °F (36.3 °C)   Resp 18   Ht 162.6 cm (64\")   Wt 67.1 kg (148 lb)   SpO2 95%   BMI 25.40 kg/m²   Estimated body mass index is 25.4 kg/m² as calculated from the following:    Height as of this encounter: 162.6 cm (64\").    Weight as of this encounter: 67.1 kg (148 lb).               Physical Exam  Constitutional:       Appearance: Normal appearance. She is normal weight.   Cardiovascular:      Rate and Rhythm: Normal rate and regular rhythm.      Heart sounds: Murmur heard.   Pulmonary:      Effort: Pulmonary effort is normal.      Breath sounds: Normal breath sounds.   Neurological:      General: No focal deficit present.      Mental Status: She is alert and oriented to person, place, and time. Mental status is at baseline.   Psychiatric:         Mood and Affect: Mood normal.         Behavior: Behavior normal.         Thought Content: Thought content normal.         Judgment: Judgment normal.        Result Review :                     Assessment and Plan     There are no diagnoses linked to this " encounter.    -Severe symptomatic aortic valve stenosis.  For TAVR  -Coronary disease.  Asymptomatic.  Medical treatment    89 years old female with severe symptomatic aortic valve stenosis.  I think that her aortic valve needs to be treated in order to improve symptoms and prolong life.  Due to age and comorbidities the patient is prohibitive risk for open heart surgery so I will recommend TAVR.  I explained risk and benefits of the procedure to the patient and family and she agreed to proceed.  She will be an open rescue if needed         Follow Up     No follow-ups on file.  Patient was given instructions and counseling regarding her condition or for health maintenance advice. Please see specific information pulled into the AVS if appropriate.

## 2024-04-04 ENCOUNTER — HOSPITAL ENCOUNTER (OUTPATIENT)
Dept: CT IMAGING | Facility: HOSPITAL | Age: 89
Discharge: HOME | End: 2024-04-04
Admitting: INTERNAL MEDICINE
Payer: MEDICARE

## 2024-04-04 VITALS — HEART RATE: 50 BPM

## 2024-04-04 DIAGNOSIS — I35.0 NONRHEUMATIC AORTIC VALVE STENOSIS: ICD-10-CM

## 2024-04-04 LAB — CREAT BLDA-MCNC: 1.3 MG/DL (ref 0.6–1.3)

## 2024-04-04 PROCEDURE — 82565 ASSAY OF CREATININE: CPT

## 2024-04-04 PROCEDURE — 25510000001 IOPAMIDOL PER 1 ML: Performed by: INTERNAL MEDICINE

## 2024-04-04 PROCEDURE — 74174 CTA ABD&PLVS W/CONTRAST: CPT

## 2024-04-04 PROCEDURE — 71275 CT ANGIOGRAPHY CHEST: CPT

## 2024-04-04 RX ADMIN — IOPAMIDOL 95 ML: 755 INJECTION, SOLUTION INTRAVENOUS at 10:33

## 2024-04-08 ENCOUNTER — PREP FOR SURGERY (OUTPATIENT)
Dept: OTHER | Facility: HOSPITAL | Age: 89
End: 2024-04-08
Payer: MEDICARE

## 2024-04-08 DIAGNOSIS — R79.9 ABNORMAL FINDING OF BLOOD CHEMISTRY, UNSPECIFIED: ICD-10-CM

## 2024-04-08 DIAGNOSIS — I35.0 NONRHEUMATIC AORTIC VALVE STENOSIS: Primary | ICD-10-CM

## 2024-04-08 DIAGNOSIS — R79.1 ABNORMAL COAGULATION PROFILE: ICD-10-CM

## 2024-04-08 DIAGNOSIS — I11.0 HYPERTENSIVE HEART DISEASE WITH HEART FAILURE: ICD-10-CM

## 2024-04-08 DIAGNOSIS — I35.0 AORTIC VALVE STENOSIS, ETIOLOGY OF CARDIAC VALVE DISEASE UNSPECIFIED: Primary | ICD-10-CM

## 2024-04-08 RX ORDER — CHLORHEXIDINE GLUCONATE ORAL RINSE 1.2 MG/ML
15 SOLUTION DENTAL ONCE
OUTPATIENT
Start: 2024-04-08 | End: 2024-04-08

## 2024-04-08 RX ORDER — CHLORHEXIDINE GLUCONATE ORAL RINSE 1.2 MG/ML
15 SOLUTION DENTAL EVERY 12 HOURS
Status: CANCELLED | OUTPATIENT
Start: 2024-04-08 | End: 2024-04-09

## 2024-04-12 ENCOUNTER — HOSPITAL ENCOUNTER (OUTPATIENT)
Dept: GENERAL RADIOLOGY | Facility: HOSPITAL | Age: 89
Discharge: HOME | End: 2024-04-12
Payer: MEDICARE

## 2024-04-12 ENCOUNTER — DOCUMENTATION (OUTPATIENT)
Dept: CARDIOLOGY | Facility: HOSPITAL | Age: 89
End: 2024-04-12
Payer: MEDICARE

## 2024-04-12 ENCOUNTER — PRE-ADMISSION TESTING (OUTPATIENT)
Dept: PREADMISSION TESTING | Facility: HOSPITAL | Age: 89
End: 2024-04-12
Payer: MEDICARE

## 2024-04-12 VITALS
SYSTOLIC BLOOD PRESSURE: 170 MMHG | OXYGEN SATURATION: 98 % | RESPIRATION RATE: 20 BRPM | BODY MASS INDEX: 28.43 KG/M2 | DIASTOLIC BLOOD PRESSURE: 76 MMHG | HEART RATE: 52 BPM | HEIGHT: 61 IN | WEIGHT: 150.6 LBS | TEMPERATURE: 97.9 F

## 2024-04-12 DIAGNOSIS — I35.0 AORTIC VALVE STENOSIS, ETIOLOGY OF CARDIAC VALVE DISEASE UNSPECIFIED: ICD-10-CM

## 2024-04-12 LAB
ABO GROUP BLD: NORMAL
ALBUMIN SERPL-MCNC: 3.8 G/DL (ref 3.5–5.2)
ALBUMIN/GLOB SERPL: 1.2 G/DL
ALP SERPL-CCNC: 88 U/L (ref 39–117)
ALT SERPL W P-5'-P-CCNC: 15 U/L (ref 1–33)
ANION GAP SERPL CALCULATED.3IONS-SCNC: 10.4 MMOL/L (ref 5–15)
APTT PPP: 28.8 SECONDS (ref 22.7–35.4)
AST SERPL-CCNC: 19 U/L (ref 1–32)
BASOPHILS # BLD AUTO: 0.05 10*3/MM3 (ref 0–0.2)
BASOPHILS NFR BLD AUTO: 0.6 % (ref 0–1.5)
BILIRUB SERPL-MCNC: 0.5 MG/DL (ref 0–1.2)
BILIRUB UR QL STRIP: NEGATIVE
BLD GP AB SCN SERPL QL: NEGATIVE
BUN SERPL-MCNC: 21 MG/DL (ref 8–23)
BUN/CREAT SERPL: 26.6 (ref 7–25)
CALCIUM SPEC-SCNC: 9.3 MG/DL (ref 8.6–10.5)
CHLORIDE SERPL-SCNC: 104 MMOL/L (ref 98–107)
CLARITY UR: CLEAR
CLOSE TME COLL+ADP + EPINEP PNL BLD: 97 % (ref 86–100)
CO2 SERPL-SCNC: 23.6 MMOL/L (ref 22–29)
COLOR UR: YELLOW
CREAT SERPL-MCNC: 0.79 MG/DL (ref 0.57–1)
DEPRECATED RDW RBC AUTO: 46.2 FL (ref 37–54)
EGFRCR SERPLBLD CKD-EPI 2021: 71.6 ML/MIN/1.73
EOSINOPHIL # BLD AUTO: 0.24 10*3/MM3 (ref 0–0.4)
EOSINOPHIL NFR BLD AUTO: 3.1 % (ref 0.3–6.2)
ERYTHROCYTE [DISTWIDTH] IN BLOOD BY AUTOMATED COUNT: 14.6 % (ref 12.3–15.4)
GLOBULIN UR ELPH-MCNC: 3.1 GM/DL
GLUCOSE SERPL-MCNC: 100 MG/DL (ref 65–99)
GLUCOSE UR STRIP-MCNC: ABNORMAL MG/DL
HBA1C MFR BLD: 5.8 % (ref 4.8–5.6)
HCT VFR BLD AUTO: 35.8 % (ref 34–46.6)
HGB BLD-MCNC: 10.8 G/DL (ref 12–15.9)
HGB UR QL STRIP.AUTO: NEGATIVE
IMM GRANULOCYTES # BLD AUTO: 0.04 10*3/MM3 (ref 0–0.05)
IMM GRANULOCYTES NFR BLD AUTO: 0.5 % (ref 0–0.5)
INR PPP: 1.15 (ref 0.9–1.1)
KETONES UR QL STRIP: NEGATIVE
LEUKOCYTE ESTERASE UR QL STRIP.AUTO: NEGATIVE
LYMPHOCYTES # BLD AUTO: 1.42 10*3/MM3 (ref 0.7–3.1)
LYMPHOCYTES NFR BLD AUTO: 18.2 % (ref 19.6–45.3)
MCH RBC QN AUTO: 26 PG (ref 26.6–33)
MCHC RBC AUTO-ENTMCNC: 30.2 G/DL (ref 31.5–35.7)
MCV RBC AUTO: 86.3 FL (ref 79–97)
MONOCYTES # BLD AUTO: 0.82 10*3/MM3 (ref 0.1–0.9)
MONOCYTES NFR BLD AUTO: 10.5 % (ref 5–12)
NEUTROPHILS NFR BLD AUTO: 5.25 10*3/MM3 (ref 1.7–7)
NEUTROPHILS NFR BLD AUTO: 67.1 % (ref 42.7–76)
NITRITE UR QL STRIP: NEGATIVE
NRBC BLD AUTO-RTO: 0 /100 WBC (ref 0–0.2)
NT-PROBNP SERPL-MCNC: 942 PG/ML (ref 0–1800)
PH UR STRIP.AUTO: 5.5 [PH] (ref 5–8)
PLATELET # BLD AUTO: 308 10*3/MM3 (ref 140–450)
PMV BLD AUTO: 9.2 FL (ref 6–12)
POTASSIUM SERPL-SCNC: 4.4 MMOL/L (ref 3.5–5.2)
PROT SERPL-MCNC: 6.9 G/DL (ref 6–8.5)
PROT UR QL STRIP: NEGATIVE
PROTHROMBIN TIME: 14.9 SECONDS (ref 11.7–14.2)
QT INTERVAL: 484 MS
QTC INTERVAL: 459 MS
RBC # BLD AUTO: 4.15 10*6/MM3 (ref 3.77–5.28)
RH BLD: POSITIVE
SARS-COV-2 RNA RESP QL NAA+PROBE: NOT DETECTED
SODIUM SERPL-SCNC: 138 MMOL/L (ref 136–145)
SP GR UR STRIP: 1.01 (ref 1–1.03)
T&S EXPIRATION DATE: NORMAL
UROBILINOGEN UR QL STRIP: ABNORMAL
WBC NRBC COR # BLD AUTO: 7.82 10*3/MM3 (ref 3.4–10.8)

## 2024-04-12 PROCEDURE — 86901 BLOOD TYPING SEROLOGIC RH(D): CPT | Performed by: NURSE PRACTITIONER

## 2024-04-12 PROCEDURE — 93010 ELECTROCARDIOGRAM REPORT: CPT | Performed by: INTERNAL MEDICINE

## 2024-04-12 PROCEDURE — 86900 BLOOD TYPING SEROLOGIC ABO: CPT | Performed by: NURSE PRACTITIONER

## 2024-04-12 PROCEDURE — 83036 HEMOGLOBIN GLYCOSYLATED A1C: CPT | Performed by: NURSE PRACTITIONER

## 2024-04-12 PROCEDURE — 85730 THROMBOPLASTIN TIME PARTIAL: CPT | Performed by: NURSE PRACTITIONER

## 2024-04-12 PROCEDURE — 93005 ELECTROCARDIOGRAM TRACING: CPT

## 2024-04-12 PROCEDURE — 87635 SARS-COV-2 COVID-19 AMP PRB: CPT

## 2024-04-12 PROCEDURE — 71046 X-RAY EXAM CHEST 2 VIEWS: CPT

## 2024-04-12 PROCEDURE — 85610 PROTHROMBIN TIME: CPT | Performed by: NURSE PRACTITIONER

## 2024-04-12 PROCEDURE — 83880 ASSAY OF NATRIURETIC PEPTIDE: CPT | Performed by: NURSE PRACTITIONER

## 2024-04-12 PROCEDURE — 86850 RBC ANTIBODY SCREEN: CPT | Performed by: NURSE PRACTITIONER

## 2024-04-12 PROCEDURE — 81003 URINALYSIS AUTO W/O SCOPE: CPT | Performed by: NURSE PRACTITIONER

## 2024-04-12 PROCEDURE — 85576 BLOOD PLATELET AGGREGATION: CPT | Performed by: NURSE PRACTITIONER

## 2024-04-12 PROCEDURE — 85025 COMPLETE CBC W/AUTO DIFF WBC: CPT | Performed by: NURSE PRACTITIONER

## 2024-04-12 PROCEDURE — 80053 COMPREHEN METABOLIC PANEL: CPT | Performed by: NURSE PRACTITIONER

## 2024-04-12 RX ORDER — CHLORHEXIDINE GLUCONATE ORAL RINSE 1.2 MG/ML
15 SOLUTION DENTAL EVERY 12 HOURS
Status: DISPENSED | OUTPATIENT
Start: 2024-04-12 | End: 2024-04-13

## 2024-04-12 NOTE — PAT
I met with Brunilda & her daughter (Nicole) during the PAT appt for scheduled TAVR 4/16/24. She has been provided pre-op instructions as well as nasal & oral meds for TAVR prep. Pt lives at home, is independent of care, and does not have visiting home health care. She ambulates with a walker when outside of the home. She does not use any supplemental oxygen. She denies any skin rashes, irritations, or signs of infection; specifically in the groin area/access site. She was hospitalized in Dec 2023 for heart failure. PAT labs reviewed. I was able to answer Brunilda & Nicole's questions. They have the contact number to the coordinator's office if they have any further questions or concerns. They understand instructions and arrival 06:30 on 4/16/24. RTRN

## 2024-04-12 NOTE — DISCHARGE INSTRUCTIONS
Take the following medications the morning of surgery with a small sip of water:  NONE UNLESS DIRECTED BY TAVR COORDINATOR    HOLD LOSARTAN NIGHT BEFORE SURGERY    If you are on prescription narcotic pain medication to control your pain you may also take that medication the morning of surgery.    General Instructions:  Do not eat or drink anything after midnight the night before surgery.  Infants may have breast milk up to four hours before surgery.  Infants drinking formula may drink formula up to six hours before surgery.   Patients who avoid smoking, chewing tobacco and alcohol for 4 weeks prior to surgery have a reduced risk of post-operative complications.  Quit smoking as many days before surgery as you can.  Do not smoke, use chewing tobacco or drink alcohol the day of surgery.   If applicable bring your C-PAP/ BI-PAP machine in with you to preop day of surgery.  Bring any papers given to you in the doctor’s office.  Wear clean comfortable clothes.  Do not wear contact lenses, false eyelashes or make-up.  Bring a case for your glasses.   Bring crutches or walker if applicable.  Remove all piercings.  Leave jewelry and any other valuables at home.  Hair extensions with metal clips must be removed prior to surgery.  The Pre-Admission Testing nurse will instruct you to bring medications if unable to obtain an accurate list in Pre-Admission Testing.        If you were given a blood bank ID arm band remember to bring it with you the day of surgery.    Preventing a Surgical Site Infection:  For 2 to 3 days before surgery, avoid shaving with a razor because the razor can irritate skin and make it easier to develop an infection.    Any areas of open skin can increase the risk of a post-operative wound infection by allowing bacteria to enter and travel throughout the body.  Notify your surgeon if you have any skin wounds / rashes even if it is not near the expected surgical site.  The area will need assessed to  determine if surgery should be delayed until it is healed.  The night prior to surgery shower using a fresh bar of anti-bacterial soap (such as Dial) and clean washcloth.  Sleep in a clean bed with clean clothing.  Do not allow pets to sleep with you.  Shower on the morning of surgery using a fresh bar of anti-bacterial soap (such as Dial) and clean washcloth.  Dry with a clean towel and dress in clean clothing.  Ask your surgeon if you will be receiving antibiotics prior to surgery.  Make sure you, your family, and all healthcare providers clean their hands with soap and water or an alcohol based hand  before caring for you or your wound.    Day of surgery:  Your arrival time is approximately two hours before your scheduled surgery time.  Upon arrival, a Pre-op nurse and Anesthesiologist will review your health history, obtain vital signs, and answer questions you may have.  The only belongings needed at this time will be your home medications and if applicable your C-PAP/BI-PAP machine.  A Pre-op nurse will start an IV and you may receive medication in preparation for surgery, including something to help you relax.      Please be aware that surgery does come with discomfort.  We want to make every effort to control your discomfort so please discuss any uncontrolled symptoms with your nurse.   Your doctor will most likely have prescribed pain medications.      If you are going home after surgery you will receive individualized written care instructions before being discharged.  A responsible adult must drive you to and from the hospital on the day of your surgery and ideally stay with you through the night.  Discharge prescriptions can be filled by the hospital pharmacy during regular pharmacy hours.  If you are having surgery late in the day/evening your prescription may be e-prescribed to your pharmacy.  Please verify your pharmacy hours or chose a 24 hour pharmacy to avoid not having access to your  prescription because your pharmacy has closed for the day.    If you are staying overnight following surgery, you will be transported to your hospital room following the recovery period.  [facility] has all private rooms.    If you have any questions please call Pre-Admission Testing at [phone].  Deductibles and co-payments are collected on the day of service. Please be prepared to pay the required co-pay, deductible or deposit on the day of service as defined by your plan.    Call your surgeon immediately if you experience any of the following symptoms:  Sore Throat  Shortness of Breath or difficulty breathing  Cough  Chills  Body soreness or muscle pain  Headache  Fever  New loss of taste or smell  Do not arrive for your surgery ill.  Your procedure will need to be rescheduled to another time.  You will need to call your physician before the day of surgery to avoid any unnecessary exposure to hospital staff as well as other patients.    BACTROBAN NASAL OINTMENT  There are many germs normally in your nose. Bactroban is an ointment that will help reduce these germs. Please follow these instructions for Bactroban use:          __1ST__The day before surgery in the evening              Date____4/15____    ___2ND_The day of surgery in the morning    Date_____4/16___    **Squirt ½ package of Bactroban Ointment onto a cotton applicator and apply to inside of 1st nostril.  Squirt the remaining Bactroban and apply to the inside of the other nostril.    PERIDEX- ORAL:  Use only if your surgeon has ordered  Use the night before and morning of surgery - Swish, gargle, and spit - do not swallow.

## 2024-04-19 ENCOUNTER — HOSPITAL ENCOUNTER (INPATIENT)
Facility: HOSPITAL | Age: 89
LOS: 1 days | Discharge: HOME | End: 2024-04-20
Payer: MEDICARE

## 2024-04-19 ENCOUNTER — ANESTHESIA (OUTPATIENT)
Dept: PERIOP | Facility: HOSPITAL | Age: 89
End: 2024-04-19
Payer: MEDICARE

## 2024-04-19 ENCOUNTER — ANCILLARY PROCEDURE (OUTPATIENT)
Dept: PERIOP | Facility: HOSPITAL | Age: 89
End: 2024-04-19
Payer: MEDICARE

## 2024-04-19 ENCOUNTER — ANESTHESIA EVENT (OUTPATIENT)
Dept: PERIOP | Facility: HOSPITAL | Age: 89
End: 2024-04-19
Payer: MEDICARE

## 2024-04-19 DIAGNOSIS — R79.1 ABNORMAL COAGULATION PROFILE: ICD-10-CM

## 2024-04-19 DIAGNOSIS — R79.9 ABNORMAL FINDING OF BLOOD CHEMISTRY, UNSPECIFIED: ICD-10-CM

## 2024-04-19 DIAGNOSIS — I35.0 AORTIC VALVE STENOSIS, ETIOLOGY OF CARDIAC VALVE DISEASE UNSPECIFIED: ICD-10-CM

## 2024-04-19 DIAGNOSIS — Z95.2 S/P TAVR (TRANSCATHETER AORTIC VALVE REPLACEMENT): ICD-10-CM

## 2024-04-19 DIAGNOSIS — I38 HEART FAILURE DUE TO VALVULAR DISEASE, CHRONIC, DIASTOLIC: ICD-10-CM

## 2024-04-19 DIAGNOSIS — I35.0 AORTIC STENOSIS, SEVERE: Primary | ICD-10-CM

## 2024-04-19 DIAGNOSIS — I50.32 HEART FAILURE DUE TO VALVULAR DISEASE, CHRONIC, DIASTOLIC: ICD-10-CM

## 2024-04-19 DIAGNOSIS — I11.0 HYPERTENSIVE HEART DISEASE WITH HEART FAILURE: ICD-10-CM

## 2024-04-19 DIAGNOSIS — I35.0 NONRHEUMATIC AORTIC VALVE STENOSIS: ICD-10-CM

## 2024-04-19 DIAGNOSIS — I35.0 NONRHEUMATIC AORTIC VALVE STENOSIS: Primary | ICD-10-CM

## 2024-04-19 LAB
ACT BLD: 141 SECONDS (ref 82–152)
ACT BLD: 152 SECONDS (ref 82–152)
ACT BLD: 293 SECONDS (ref 82–152)
AORTIC DIMENSIONLESS INDEX: 0.6 (DI)
BASE EXCESS BLDA CALC-SCNC: -4 MMOL/L (ref -5–5)
BH CV ECHO MEAS - AO MAX PG: 9.7 MMHG
BH CV ECHO MEAS - AO MEAN PG: 5.1 MMHG
BH CV ECHO MEAS - AO V2 MAX: 155.8 CM/SEC
BH CV ECHO MEAS - AO V2 VTI: 42.1 CM
BH CV ECHO MEAS - AVA(I,D): 1.19 CM2
BH CV ECHO MEAS - LV MAX PG: 2.6 MMHG
BH CV ECHO MEAS - LV MEAN PG: 1.65 MMHG
BH CV ECHO MEAS - LV V1 MAX: 81.2 CM/SEC
BH CV ECHO MEAS - LV V1 VTI: 23.5 CM
BH CV ECHO MEAS - LVOT AREA: 2.13 CM2
BH CV ECHO MEAS - LVOT DIAM: 1.65 CM
BH CV ECHO MEAS - SV(LVOT): 50 ML
CA-I BLDA-SCNC: ABNORMAL MMOL/L
CO2 BLDA-SCNC: 23 MMOL/L (ref 24–29)
GLUCOSE BLDC GLUCOMTR-MCNC: 101 MG/DL (ref 70–130)
GLUCOSE BLDC GLUCOMTR-MCNC: 116 MG/DL (ref 70–130)
GLUCOSE BLDC GLUCOMTR-MCNC: 121 MG/DL (ref 70–130)
GLUCOSE BLDC GLUCOMTR-MCNC: 83 MG/DL (ref 70–130)
GLUCOSE BLDC GLUCOMTR-MCNC: 92 MG/DL (ref 70–130)
HCO3 BLDA-SCNC: 22.1 MMOL/L (ref 22–26)
HCT VFR BLDA CALC: 33 % (ref 38–51)
HGB BLDA-MCNC: 11.2 G/DL (ref 12–17)
PCO2 BLDA: 40.6 MM HG (ref 35–45)
PH BLDA: 7.35 PH UNITS (ref 7.35–7.6)
PO2 BLDA: 80 MMHG (ref 80–105)
POTASSIUM BLDA-SCNC: 3.9 MMOL/L (ref 3.5–4.9)
SAO2 % BLDA: 95 % (ref 95–98)

## 2024-04-19 PROCEDURE — B41G1ZZ FLUOROSCOPY OF LEFT LOWER EXTREMITY ARTERIES USING LOW OSMOLAR CONTRAST: ICD-10-PCS

## 2024-04-19 PROCEDURE — 25010000002 SUGAMMADEX 200 MG/2ML SOLUTION: Performed by: ANESTHESIOLOGY

## 2024-04-19 PROCEDURE — 85018 HEMOGLOBIN: CPT

## 2024-04-19 PROCEDURE — 93321 DOPPLER ECHO F-UP/LMTD STD: CPT

## 2024-04-19 PROCEDURE — 02RF38Z REPLACEMENT OF AORTIC VALVE WITH ZOOPLASTIC TISSUE, PERCUTANEOUS APPROACH: ICD-10-PCS

## 2024-04-19 PROCEDURE — B3101ZZ FLUOROSCOPY OF THORACIC AORTA USING LOW OSMOLAR CONTRAST: ICD-10-PCS

## 2024-04-19 PROCEDURE — 82947 ASSAY GLUCOSE BLOOD QUANT: CPT

## 2024-04-19 PROCEDURE — 25010000002 CEFAZOLIN PER 500 MG: Performed by: NURSE PRACTITIONER

## 2024-04-19 PROCEDURE — 82803 BLOOD GASES ANY COMBINATION: CPT

## 2024-04-19 PROCEDURE — 33361 REPLACE AORTIC VALVE PERQ: CPT

## 2024-04-19 PROCEDURE — 93325 DOPPLER ECHO COLOR FLOW MAPG: CPT

## 2024-04-19 PROCEDURE — 25010000002 SUCCINYLCHOLINE PER 20 MG: Performed by: ANESTHESIOLOGY

## 2024-04-19 PROCEDURE — 85014 HEMATOCRIT: CPT

## 2024-04-19 PROCEDURE — 25010000002 PROTAMINE SULFATE PER 10 MG: Performed by: ANESTHESIOLOGY

## 2024-04-19 PROCEDURE — 25010000002 PROPOFOL 10 MG/ML EMULSION: Performed by: ANESTHESIOLOGY

## 2024-04-19 PROCEDURE — 25010000002 GLYCOPYRROLATE 0.2 MG/ML SOLUTION: Performed by: ANESTHESIOLOGY

## 2024-04-19 PROCEDURE — B4101ZZ FLUOROSCOPY OF ABDOMINAL AORTA USING LOW OSMOLAR CONTRAST: ICD-10-PCS

## 2024-04-19 PROCEDURE — 25010000002 ONDANSETRON PER 1 MG: Performed by: ANESTHESIOLOGY

## 2024-04-19 PROCEDURE — 33361 REPLACE AORTIC VALVE PERQ: CPT | Performed by: INTERNAL MEDICINE

## 2024-04-19 PROCEDURE — B41F1ZZ FLUOROSCOPY OF RIGHT LOWER EXTREMITY ARTERIES USING LOW OSMOLAR CONTRAST: ICD-10-PCS

## 2024-04-19 PROCEDURE — 85347 COAGULATION TIME ACTIVATED: CPT

## 2024-04-19 PROCEDURE — 82948 REAGENT STRIP/BLOOD GLUCOSE: CPT

## 2024-04-19 PROCEDURE — 25510000001 IOPAMIDOL PER 1 ML

## 2024-04-19 PROCEDURE — 25010000002 HEPARIN (PORCINE) PER 1000 UNITS: Performed by: ANESTHESIOLOGY

## 2024-04-19 PROCEDURE — 25010000002 FENTANYL CITRATE (PF) 50 MCG/ML SOLUTION: Performed by: ANESTHESIOLOGY

## 2024-04-19 PROCEDURE — 25810000003 LACTATED RINGERS PER 1000 ML: Performed by: ANESTHESIOLOGY

## 2024-04-19 PROCEDURE — 93308 TTE F-UP OR LMTD: CPT

## 2024-04-19 RX ORDER — NALOXONE HCL 0.4 MG/ML
0.2 VIAL (ML) INJECTION AS NEEDED
Status: DISCONTINUED | OUTPATIENT
Start: 2024-04-19 | End: 2024-04-19 | Stop reason: HOSPADM

## 2024-04-19 RX ORDER — DEXTROSE MONOHYDRATE 25 G/50ML
25 INJECTION, SOLUTION INTRAVENOUS
Status: DISCONTINUED | OUTPATIENT
Start: 2024-04-19 | End: 2024-04-20 | Stop reason: HOSPADM

## 2024-04-19 RX ORDER — HYDROMORPHONE HYDROCHLORIDE 1 MG/ML
0.25 INJECTION, SOLUTION INTRAMUSCULAR; INTRAVENOUS; SUBCUTANEOUS
Status: DISCONTINUED | OUTPATIENT
Start: 2024-04-19 | End: 2024-04-19 | Stop reason: HOSPADM

## 2024-04-19 RX ORDER — LIDOCAINE HCL/EPINEPHRINE/PF 2%-1:200K
VIAL (ML) INJECTION AS NEEDED
Status: DISCONTINUED | OUTPATIENT
Start: 2024-04-19 | End: 2024-04-19 | Stop reason: HOSPADM

## 2024-04-19 RX ORDER — EPHEDRINE SULFATE 50 MG/ML
5 INJECTION, SOLUTION INTRAVENOUS ONCE AS NEEDED
Status: DISCONTINUED | OUTPATIENT
Start: 2024-04-19 | End: 2024-04-19 | Stop reason: HOSPADM

## 2024-04-19 RX ORDER — ASPIRIN 81 MG/1
81 TABLET ORAL DAILY
Status: DISCONTINUED | OUTPATIENT
Start: 2024-04-20 | End: 2024-04-20 | Stop reason: HOSPADM

## 2024-04-19 RX ORDER — SODIUM CHLORIDE 9 MG/ML
100 INJECTION, SOLUTION INTRAVENOUS CONTINUOUS
Status: ACTIVE | OUTPATIENT
Start: 2024-04-19 | End: 2024-04-19

## 2024-04-19 RX ORDER — AMLODIPINE BESYLATE 5 MG/1
2.5 TABLET ORAL DAILY
Status: DISCONTINUED | OUTPATIENT
Start: 2024-04-19 | End: 2024-04-20 | Stop reason: HOSPADM

## 2024-04-19 RX ORDER — AMIODARONE HYDROCHLORIDE 200 MG/1
200 TABLET ORAL DAILY
Status: DISCONTINUED | OUTPATIENT
Start: 2024-04-19 | End: 2024-04-20 | Stop reason: HOSPADM

## 2024-04-19 RX ORDER — MIDAZOLAM HYDROCHLORIDE 1 MG/ML
0.5 INJECTION INTRAMUSCULAR; INTRAVENOUS
Status: DISCONTINUED | OUTPATIENT
Start: 2024-04-19 | End: 2024-04-19 | Stop reason: HOSPADM

## 2024-04-19 RX ORDER — FENTANYL CITRATE 50 UG/ML
25 INJECTION, SOLUTION INTRAMUSCULAR; INTRAVENOUS
Status: DISCONTINUED | OUTPATIENT
Start: 2024-04-19 | End: 2024-04-19 | Stop reason: HOSPADM

## 2024-04-19 RX ORDER — FAMOTIDINE 10 MG/ML
20 INJECTION, SOLUTION INTRAVENOUS ONCE
Status: COMPLETED | OUTPATIENT
Start: 2024-04-19 | End: 2024-04-19

## 2024-04-19 RX ORDER — DROPERIDOL 2.5 MG/ML
0.62 INJECTION, SOLUTION INTRAMUSCULAR; INTRAVENOUS
Status: DISCONTINUED | OUTPATIENT
Start: 2024-04-19 | End: 2024-04-19 | Stop reason: HOSPADM

## 2024-04-19 RX ORDER — ACETAMINOPHEN 500 MG
500 TABLET ORAL ONCE
Status: COMPLETED | OUTPATIENT
Start: 2024-04-19 | End: 2024-04-19

## 2024-04-19 RX ORDER — IPRATROPIUM BROMIDE AND ALBUTEROL SULFATE 2.5; .5 MG/3ML; MG/3ML
3 SOLUTION RESPIRATORY (INHALATION) ONCE AS NEEDED
Status: DISCONTINUED | OUTPATIENT
Start: 2024-04-19 | End: 2024-04-19 | Stop reason: HOSPADM

## 2024-04-19 RX ORDER — ONDANSETRON 4 MG/1
4 TABLET, ORALLY DISINTEGRATING ORAL EVERY 6 HOURS PRN
Status: DISCONTINUED | OUTPATIENT
Start: 2024-04-19 | End: 2024-04-20 | Stop reason: HOSPADM

## 2024-04-19 RX ORDER — LEVOTHYROXINE SODIUM 88 UG/1
88 TABLET ORAL
Status: DISCONTINUED | OUTPATIENT
Start: 2024-04-19 | End: 2024-04-20 | Stop reason: HOSPADM

## 2024-04-19 RX ORDER — ROPINIROLE 0.5 MG/1
0.25 TABLET, FILM COATED ORAL NIGHTLY
Status: DISCONTINUED | OUTPATIENT
Start: 2024-04-19 | End: 2024-04-20 | Stop reason: HOSPADM

## 2024-04-19 RX ORDER — INSULIN LISPRO 100 [IU]/ML
2-7 INJECTION, SOLUTION INTRAVENOUS; SUBCUTANEOUS
Status: DISCONTINUED | OUTPATIENT
Start: 2024-04-19 | End: 2024-04-20 | Stop reason: HOSPADM

## 2024-04-19 RX ORDER — PROMETHAZINE HYDROCHLORIDE 25 MG/1
25 TABLET ORAL ONCE AS NEEDED
Status: DISCONTINUED | OUTPATIENT
Start: 2024-04-19 | End: 2024-04-19 | Stop reason: HOSPADM

## 2024-04-19 RX ORDER — DIPHENHYDRAMINE HYDROCHLORIDE 50 MG/ML
12.5 INJECTION INTRAMUSCULAR; INTRAVENOUS
Status: DISCONTINUED | OUTPATIENT
Start: 2024-04-19 | End: 2024-04-19 | Stop reason: HOSPADM

## 2024-04-19 RX ORDER — IBUPROFEN 600 MG/1
1 TABLET ORAL
Status: DISCONTINUED | OUTPATIENT
Start: 2024-04-19 | End: 2024-04-20 | Stop reason: HOSPADM

## 2024-04-19 RX ORDER — DEXMEDETOMIDINE HYDROCHLORIDE 100 UG/ML
INJECTION, SOLUTION INTRAVENOUS AS NEEDED
Status: DISCONTINUED | OUTPATIENT
Start: 2024-04-19 | End: 2024-04-19 | Stop reason: SURG

## 2024-04-19 RX ORDER — PANTOPRAZOLE SODIUM 40 MG/1
40 TABLET, DELAYED RELEASE ORAL
Status: DISCONTINUED | OUTPATIENT
Start: 2024-04-19 | End: 2024-04-20 | Stop reason: HOSPADM

## 2024-04-19 RX ORDER — CHLORHEXIDINE GLUCONATE ORAL RINSE 1.2 MG/ML
15 SOLUTION DENTAL ONCE
Status: COMPLETED | OUTPATIENT
Start: 2024-04-19 | End: 2024-04-19

## 2024-04-19 RX ORDER — ROCURONIUM BROMIDE 10 MG/ML
INJECTION, SOLUTION INTRAVENOUS AS NEEDED
Status: DISCONTINUED | OUTPATIENT
Start: 2024-04-19 | End: 2024-04-19 | Stop reason: SURG

## 2024-04-19 RX ORDER — HYDRALAZINE HYDROCHLORIDE 20 MG/ML
5 INJECTION INTRAMUSCULAR; INTRAVENOUS
Status: DISCONTINUED | OUTPATIENT
Start: 2024-04-19 | End: 2024-04-19 | Stop reason: HOSPADM

## 2024-04-19 RX ORDER — MAGNESIUM HYDROXIDE 1200 MG/15ML
LIQUID ORAL AS NEEDED
Status: DISCONTINUED | OUTPATIENT
Start: 2024-04-19 | End: 2024-04-19 | Stop reason: HOSPADM

## 2024-04-19 RX ORDER — HYDROCODONE BITARTRATE AND ACETAMINOPHEN 5; 325 MG/1; MG/1
1 TABLET ORAL EVERY 4 HOURS PRN
Status: DISCONTINUED | OUTPATIENT
Start: 2024-04-19 | End: 2024-04-20 | Stop reason: HOSPADM

## 2024-04-19 RX ORDER — PROTAMINE SULFATE 10 MG/ML
INJECTION, SOLUTION INTRAVENOUS AS NEEDED
Status: DISCONTINUED | OUTPATIENT
Start: 2024-04-19 | End: 2024-04-19 | Stop reason: SURG

## 2024-04-19 RX ORDER — NITROGLYCERIN 0.4 MG/1
0.4 TABLET SUBLINGUAL
Status: DISCONTINUED | OUTPATIENT
Start: 2024-04-19 | End: 2024-04-20 | Stop reason: HOSPADM

## 2024-04-19 RX ORDER — LABETALOL HYDROCHLORIDE 5 MG/ML
5 INJECTION, SOLUTION INTRAVENOUS
Status: DISCONTINUED | OUTPATIENT
Start: 2024-04-19 | End: 2024-04-19 | Stop reason: HOSPADM

## 2024-04-19 RX ORDER — METOPROLOL SUCCINATE 25 MG/1
12.5 TABLET, EXTENDED RELEASE ORAL DAILY
Status: DISCONTINUED | OUTPATIENT
Start: 2024-04-20 | End: 2024-04-20 | Stop reason: HOSPADM

## 2024-04-19 RX ORDER — FLUMAZENIL 0.1 MG/ML
0.2 INJECTION INTRAVENOUS AS NEEDED
Status: DISCONTINUED | OUTPATIENT
Start: 2024-04-19 | End: 2024-04-19 | Stop reason: HOSPADM

## 2024-04-19 RX ORDER — HEPARIN SODIUM 1000 [USP'U]/ML
INJECTION, SOLUTION INTRAVENOUS; SUBCUTANEOUS AS NEEDED
Status: DISCONTINUED | OUTPATIENT
Start: 2024-04-19 | End: 2024-04-19 | Stop reason: SURG

## 2024-04-19 RX ORDER — ASPIRIN 81 MG/1
81 TABLET ORAL DAILY
Status: DISCONTINUED | OUTPATIENT
Start: 2024-04-19 | End: 2024-04-19

## 2024-04-19 RX ORDER — FENTANYL CITRATE 50 UG/ML
50 INJECTION, SOLUTION INTRAMUSCULAR; INTRAVENOUS
Status: DISCONTINUED | OUTPATIENT
Start: 2024-04-19 | End: 2024-04-19 | Stop reason: HOSPADM

## 2024-04-19 RX ORDER — NICOTINE POLACRILEX 4 MG
15 LOZENGE BUCCAL
Status: DISCONTINUED | OUTPATIENT
Start: 2024-04-19 | End: 2024-04-20 | Stop reason: HOSPADM

## 2024-04-19 RX ORDER — SODIUM CHLORIDE 0.9 % (FLUSH) 0.9 %
3 SYRINGE (ML) INJECTION EVERY 12 HOURS SCHEDULED
Status: DISCONTINUED | OUTPATIENT
Start: 2024-04-19 | End: 2024-04-19 | Stop reason: HOSPADM

## 2024-04-19 RX ORDER — GLYCOPYRROLATE 0.2 MG/ML
INJECTION INTRAMUSCULAR; INTRAVENOUS AS NEEDED
Status: DISCONTINUED | OUTPATIENT
Start: 2024-04-19 | End: 2024-04-19 | Stop reason: SURG

## 2024-04-19 RX ORDER — SODIUM CHLORIDE 0.9 % (FLUSH) 0.9 %
3-10 SYRINGE (ML) INJECTION AS NEEDED
Status: DISCONTINUED | OUTPATIENT
Start: 2024-04-19 | End: 2024-04-19 | Stop reason: HOSPADM

## 2024-04-19 RX ORDER — ROSUVASTATIN CALCIUM 10 MG/1
10 TABLET, COATED ORAL NIGHTLY
Status: DISCONTINUED | OUTPATIENT
Start: 2024-04-19 | End: 2024-04-20 | Stop reason: HOSPADM

## 2024-04-19 RX ORDER — LOSARTAN POTASSIUM 25 MG/1
25 TABLET ORAL 2 TIMES DAILY
Status: DISCONTINUED | OUTPATIENT
Start: 2024-04-19 | End: 2024-04-20 | Stop reason: HOSPADM

## 2024-04-19 RX ORDER — SODIUM CHLORIDE, SODIUM LACTATE, POTASSIUM CHLORIDE, CALCIUM CHLORIDE 600; 310; 30; 20 MG/100ML; MG/100ML; MG/100ML; MG/100ML
9 INJECTION, SOLUTION INTRAVENOUS CONTINUOUS
Status: DISCONTINUED | OUTPATIENT
Start: 2024-04-19 | End: 2024-04-20 | Stop reason: HOSPADM

## 2024-04-19 RX ORDER — SODIUM CHLORIDE 9 MG/ML
40 INJECTION, SOLUTION INTRAVENOUS AS NEEDED
Status: DISCONTINUED | OUTPATIENT
Start: 2024-04-19 | End: 2024-04-19 | Stop reason: HOSPADM

## 2024-04-19 RX ORDER — ACETAMINOPHEN 325 MG/1
650 TABLET ORAL EVERY 4 HOURS PRN
Status: DISCONTINUED | OUTPATIENT
Start: 2024-04-19 | End: 2024-04-20 | Stop reason: HOSPADM

## 2024-04-19 RX ORDER — LIDOCAINE HYDROCHLORIDE 20 MG/ML
INJECTION, SOLUTION INFILTRATION; PERINEURAL AS NEEDED
Status: DISCONTINUED | OUTPATIENT
Start: 2024-04-19 | End: 2024-04-19 | Stop reason: HOSPADM

## 2024-04-19 RX ORDER — NOREPINEPHRINE BITARTRATE 1 MG/ML
INJECTION, SOLUTION INTRAVENOUS CONTINUOUS PRN
Status: DISCONTINUED | OUTPATIENT
Start: 2024-04-19 | End: 2024-04-19 | Stop reason: SURG

## 2024-04-19 RX ORDER — ONDANSETRON 2 MG/ML
INJECTION INTRAMUSCULAR; INTRAVENOUS AS NEEDED
Status: DISCONTINUED | OUTPATIENT
Start: 2024-04-19 | End: 2024-04-19 | Stop reason: SURG

## 2024-04-19 RX ORDER — ONDANSETRON 2 MG/ML
4 INJECTION INTRAMUSCULAR; INTRAVENOUS EVERY 6 HOURS PRN
Status: DISCONTINUED | OUTPATIENT
Start: 2024-04-19 | End: 2024-04-20 | Stop reason: HOSPADM

## 2024-04-19 RX ORDER — SUCCINYLCHOLINE CHLORIDE 20 MG/ML
INJECTION INTRAMUSCULAR; INTRAVENOUS AS NEEDED
Status: DISCONTINUED | OUTPATIENT
Start: 2024-04-19 | End: 2024-04-19 | Stop reason: SURG

## 2024-04-19 RX ORDER — SODIUM CHLORIDE 9 MG/ML
INJECTION, SOLUTION INTRAVENOUS CONTINUOUS PRN
Status: DISCONTINUED | OUTPATIENT
Start: 2024-04-19 | End: 2024-04-19 | Stop reason: SURG

## 2024-04-19 RX ORDER — PROPOFOL 10 MG/ML
VIAL (ML) INTRAVENOUS AS NEEDED
Status: DISCONTINUED | OUTPATIENT
Start: 2024-04-19 | End: 2024-04-19 | Stop reason: SURG

## 2024-04-19 RX ORDER — PROMETHAZINE HYDROCHLORIDE 25 MG/1
25 SUPPOSITORY RECTAL ONCE AS NEEDED
Status: DISCONTINUED | OUTPATIENT
Start: 2024-04-19 | End: 2024-04-19 | Stop reason: HOSPADM

## 2024-04-19 RX ORDER — ONDANSETRON 2 MG/ML
4 INJECTION INTRAMUSCULAR; INTRAVENOUS ONCE AS NEEDED
Status: DISCONTINUED | OUTPATIENT
Start: 2024-04-19 | End: 2024-04-19 | Stop reason: HOSPADM

## 2024-04-19 RX ADMIN — SODIUM CHLORIDE 2 G: 900 INJECTION INTRAVENOUS at 09:36

## 2024-04-19 RX ADMIN — HEPARIN SODIUM 10000 UNITS: 1000 INJECTION, SOLUTION INTRAVENOUS; SUBCUTANEOUS at 10:05

## 2024-04-19 RX ADMIN — SUGAMMADEX 200 MG: 100 INJECTION, SOLUTION INTRAVENOUS at 10:41

## 2024-04-19 RX ADMIN — 0.12% CHLORHEXIDINE GLUCONATE 15 ML: 1.2 RINSE ORAL at 08:23

## 2024-04-19 RX ADMIN — ROPINIROLE HYDROCHLORIDE 0.25 MG: 0.5 TABLET, FILM COATED ORAL at 22:17

## 2024-04-19 RX ADMIN — SODIUM CHLORIDE: 9 INJECTION, SOLUTION INTRAVENOUS at 09:19

## 2024-04-19 RX ADMIN — AMLODIPINE BESYLATE 2.5 MG: 5 TABLET ORAL at 22:17

## 2024-04-19 RX ADMIN — SODIUM CHLORIDE, POTASSIUM CHLORIDE, SODIUM LACTATE AND CALCIUM CHLORIDE 9 ML/HR: 600; 310; 30; 20 INJECTION, SOLUTION INTRAVENOUS at 08:10

## 2024-04-19 RX ADMIN — PROTAMINE SULFATE 60 MG: 10 INJECTION, SOLUTION INTRAVENOUS at 10:32

## 2024-04-19 RX ADMIN — PANTOPRAZOLE SODIUM 40 MG: 40 TABLET, DELAYED RELEASE ORAL at 18:33

## 2024-04-19 RX ADMIN — NOREPINEPHRINE BITARTRATE 0.03 MCG/KG/MIN: 1 SOLUTION INTRAVENOUS at 09:33

## 2024-04-19 RX ADMIN — PROPOFOL 10 MCG/KG/MIN: 10 INJECTION, EMULSION INTRAVENOUS at 09:25

## 2024-04-19 RX ADMIN — ROSUVASTATIN CALCIUM 10 MG: 10 TABLET, FILM COATED ORAL at 22:17

## 2024-04-19 RX ADMIN — ACETAMINOPHEN 325MG 650 MG: 325 TABLET ORAL at 22:16

## 2024-04-19 RX ADMIN — GLYCOPYRROLATE 0.2 MG: 0.2 INJECTION INTRAMUSCULAR; INTRAVENOUS at 10:04

## 2024-04-19 RX ADMIN — SUCCINYLCHOLINE CHLORIDE 80 MG: 20 INJECTION, SOLUTION INTRAMUSCULAR; INTRAVENOUS; PARENTERAL at 10:19

## 2024-04-19 RX ADMIN — LOSARTAN POTASSIUM 25 MG: 25 TABLET, FILM COATED ORAL at 22:17

## 2024-04-19 RX ADMIN — PROPOFOL 50 MG: 10 INJECTION, EMULSION INTRAVENOUS at 10:18

## 2024-04-19 RX ADMIN — ROCURONIUM BROMIDE 30 MG: 10 INJECTION, SOLUTION INTRAVENOUS at 10:21

## 2024-04-19 RX ADMIN — FENTANYL CITRATE 25 MCG: 50 INJECTION, SOLUTION INTRAMUSCULAR; INTRAVENOUS at 08:29

## 2024-04-19 RX ADMIN — DEXMEDETOMIDINE HCL 8 MCG: 100 INJECTION INTRAVENOUS at 09:35

## 2024-04-19 RX ADMIN — FENTANYL CITRATE 25 MCG: 50 INJECTION, SOLUTION INTRAMUSCULAR; INTRAVENOUS at 08:38

## 2024-04-19 RX ADMIN — SODIUM CHLORIDE 1 MCG/KG/HR: 9 INJECTION, SOLUTION INTRAVENOUS at 09:18

## 2024-04-19 RX ADMIN — ONDANSETRON 4 MG: 2 INJECTION INTRAMUSCULAR; INTRAVENOUS at 10:26

## 2024-04-19 RX ADMIN — FAMOTIDINE 20 MG: 10 INJECTION INTRAVENOUS at 08:22

## 2024-04-19 RX ADMIN — ACETAMINOPHEN 500 MG: 500 TABLET ORAL at 08:27

## 2024-04-19 RX ADMIN — DEXMEDETOMIDINE HCL 8 MCG: 100 INJECTION INTRAVENOUS at 09:20

## 2024-04-19 RX ADMIN — DEXMEDETOMIDINE HCL 8 MCG: 100 INJECTION INTRAVENOUS at 09:27

## 2024-04-19 NOTE — ANESTHESIA PROCEDURE NOTES
Airway  Urgency: elective    Date/Time: 4/19/2024 10:20 AM  Airway not difficult    General Information and Staff    Patient location during procedure: OR  Anesthesiologist: Meenu Hodgson MD    Indications and Patient Condition  Indications for airway management: airway protection    Preoxygenated: yes  MILS maintained throughout  Mask difficulty assessment: 1 - vent by mask    Final Airway Details  Final airway type: endotracheal airway      Successful airway: ETT  Cuffed: yes   Successful intubation technique: direct laryngoscopy  Endotracheal tube insertion site: oral  Blade: Terri  Blade size: 3  ETT size (mm): 8.0  Cormack-Lehane Classification: grade I - full view of glottis  Placement verified by: chest auscultation and capnometry   Measured from: teeth  Number of attempts at approach: 1  Assessment: lips, teeth, and gum same as pre-op and atraumatic intubation

## 2024-04-19 NOTE — ANESTHESIA PROCEDURE NOTES
Arterial Line      Patient reassessed immediately prior to procedure    Patient location during procedure: OR   Line placed for hemodynamic monitoring and ABGs/Labs/ISTAT.  Performed By   Anesthesiologist: Hollis Fay MD   Preanesthetic Checklist  Completed: patient identified, IV checked, site marked, risks and benefits discussed, surgical consent, monitors and equipment checked, pre-op evaluation and timeout performed  Arterial Line Prep    Sterile Tech: cap, gloves, sterile barriers and mask  Prep: ChloraPrep  Patient monitoring: EKG, continuous pulse oximetry and blood pressure monitoring  Arterial Line Procedure   Laterality:right  Location:  radial artery  Catheter size: 20 G   Guidance: palpation technique  Number of attempts: 1  Successful placement: yes Images: still images not obtained  Post Assessment   Dressing Type: wrist guard applied, secured with tape and occlusive dressing applied.   Complications no  Circ/Move/Sens Assessment: normal and unchanged.   Patient Tolerance: patient tolerated the procedure well with no apparent complications  Additional Notes  3 attempts on left radial , unable to thread the catheter even with good back flow.

## 2024-04-19 NOTE — DISCHARGE SUMMARY
Date of Admission: 4/19/2024  Date of Discharge:  4/19/2024    Discharge Diagnosis:   - Severe aortic valve stenosis -- s/p TAVR with Dr. Cash and Dr. Brody   - DM II  - HTN  - HLD  - CKD IIIa  - HFpEF  - Hypothyroidism   - Atrial fibrillation     Presenting Problem/History of Present Illness  Nonrheumatic aortic valve stenosis [I35.0]  Aortic stenosis [I35.0]     Patient is a 89 year female who was seen by Dr. Brody on 4/3 for evaluation of severe aortic valve stenosis. Dr. Brody reviewed patient films and recommended TAVR due to age and co-morbidities. TAVR CTA was completed, pre-op studies were completed, and patient was scheduled for elective TAVR.    Hospital Course    Patient presented to our facility on 4/19 and on that same day underwent  Transcatheter aortic valve replacement (TAVR) utilizing a 14 Burkinan Sheath and a 26 mm Medtronic transcatheter heart valve with Dr. Cash and Dr. Brody (see op note for more detail). Operation went well and afterward patient was extubated and transferred to recovery unit. Later that day patient was transferred to [facility]. On 4/20, TTE was completed and showed ***. Groin site ***. Patient was deemed ready for discharge home. Patient to follow up with interventional cardiology.     Procedures Performed  Procedure(s):  TRANSFEMORAL TRANSCATHETER AORTIC VALVE REPLACEMENT with intraoperative transthoracic echocardiogram and possible open surgical rescue  Transfemoral Transcatheter Aortic Valve Replacement with intraoperative transthoracic echocardiogram and possible open surgical rescue       Consults:   Consults       No orders found from 3/21/2024 to 4/20/2024.            Pertinent Test Results:    Lab Results   Component Value Date    WBC 7.82 04/12/2024    HGB 11.2 (L) 04/19/2024    HCT 33 (L) 04/19/2024    MCV 86.3 04/12/2024     04/12/2024      Lab Results   Component Value Date    GLUCOSE 100 (H) 04/12/2024    CALCIUM 9.3  04/12/2024     04/12/2024    K 4.4 04/12/2024    CO2 23.6 04/12/2024     04/12/2024    BUN 21 04/12/2024    CREATININE 0.79 04/12/2024    EGFRIFAFRI 57 (L) 12/06/2021    EGFRIFNONA 49 (L) 12/06/2021    BCR 26.6 (H) 04/12/2024    ANIONGAP 10.4 04/12/2024     Lab Results   Component Value Date    INR 1.15 (H) 04/12/2024    PROTIME 14.9 (H) 04/12/2024         Condition on Discharge: Stable     Vital Signs  Temp:  [97.5 °F (36.4 °C)-97.7 °F (36.5 °C)] 97.6 °F (36.4 °C)  Heart Rate:  [43-60] 44  Resp:  [12-18] 12  BP: ()/() 124/45  Arterial Line BP: (114-304)/() 122/40      Discharge Disposition      Discharge Medications     Discharge Medications        Continue These Medications        Instructions Start Date   Accu-Chek Michelle Plus w/Device kit   Use to check glucose once daily . DM2/E11.9      accu-chek multiclix lancets   Use to check glucose once daily .  DM/ e11.9             ASK your doctor about these medications        Instructions Start Date   amiodarone 200 MG tablet  Commonly known as: PACERONE   200 mg, Oral, Daily      amLODIPine 2.5 MG tablet  Commonly known as: NORVASC   2.5 mg, Oral, Daily      empagliflozin 10 MG tablet tablet  Commonly known as: Jardiance   10 mg, Oral, Daily      ferrous gluconate 324 MG tablet  Commonly known as: FERGON   Take 1 tablet by mouth 5(five) times a week with breakfast.      glucose blood test strip  Commonly known as: Accu-Chek Michelle Plus   Use to check glucose once daily . DM/ e11.9      levothyroxine 88 MCG tablet  Commonly known as: SYNTHROID, LEVOTHROID   88 mcg, Oral, Every Early Morning      losartan 25 MG tablet  Commonly known as: COZAAR   25 mg, Oral, 2 Times Daily      metoprolol succinate XL 25 MG 24 hr tablet  Commonly known as: TOPROL-XL   12.5 mg, Oral, Every 24 Hours Scheduled      pantoprazole 40 MG EC tablet  Commonly known as: PROTONIX   40 mg, Oral, 2 Times Daily Before Meals      rOPINIRole 0.25 MG tablet  Commonly known  as: REQUIP   0.25 mg, Oral, Nightly, Take 1 hour before bedtime.      rosuvastatin 10 MG tablet  Commonly known as: CRESTOR   10 mg, Oral, Nightly      vitamin B-12 1000 MCG tablet  Commonly known as: CYANOCOBALAMIN   1,000 mcg, Oral, Daily      vitamin D 1.25 MG (44765 UT) capsule capsule  Commonly known as: ERGOCALCIFEROL   50,000 Units, Oral, Weekly               Discharge Diet: Heart healthy     Activity at Discharge: As tolerated     Follow-up Appointments  Future Appointments   Date Time Provider Department Center   5/2/2024 10:30 AM Winston Cunningham MD [id number] None   5/7/2024 10:45 AM Pérez Wheat MD [facility]  [facility]   9/3/2024  8:50 AM [facility]  [facility]   9/9/2024  9:30 AM Kaleigh Fagan APRN [facility]  [facility]     Additional Instructions for the Follow-ups that You Need to Schedule       Ambulatory Referral to Cardiac Rehab   As directed              Test Results Pending at Discharge       Rudolph Fair PA-C  04/19/24  13:29 EDT

## 2024-04-19 NOTE — ANESTHESIA POSTPROCEDURE EVALUATION
"Patient: Briseida Antony    Procedure Summary       Date: 04/19/24 Room / Location: [address] CARDIOVASCULAR OPERATING ROOM    Anesthesia Start: 0917 Anesthesia Stop: 1105    Procedures:       TRANSFEMORAL TRANSCATHETER AORTIC VALVE REPLACEMENT with intraoperative transthoracic echocardiogram and possible open surgical rescue (Chest)      Transfemoral Transcatheter Aortic Valve Replacement with intraoperative transthoracic echocardiogram and possible open surgical rescue Diagnosis:       Nonrheumatic aortic valve stenosis      (Nonrheumatic aortic valve stenosis [I35.0])    Surgeons: Sukhjinder Brody MD; Orlando Cash MD Provider: Meenu Hodgson MD    Anesthesia Type: MAC, Yudi ASA Status: 4            Anesthesia Type: MAC, Yudi    Vitals  Vitals Value Taken Time   /43 04/19/24 1215   Temp 36.4 °C (97.5 °F) 04/19/24 1222   Pulse 45 04/19/24 1224   Resp 12 04/19/24 1215   SpO2 100 % 04/19/24 1224   Vitals shown include unfiled device data.        Post Anesthesia Care and Evaluation    Patient location during evaluation: bedside  Patient participation: complete - patient participated  Level of consciousness: sleepy but conscious  Pain management: adequate    Airway patency: patent  Anesthetic complications: No anesthetic complications    Cardiovascular status: acceptable  Respiratory status: acceptable  Hydration status: acceptable    Comments: */43   Pulse (!) 46   Temp 36.4 °C (97.5 °F) (Oral)   Resp 12   Ht 154 cm (60.63\")   Wt 68.3 kg (150 lb 9.2 oz)   LMP  (LMP Unknown)   SpO2 90%   BMI 28.80 kg/m²       "

## 2024-04-19 NOTE — OP NOTE
TAVR OPERATIVE REPORT    CO-SURGEON: Sukhjinder Brody MD  CO-SURGEON: Orlando Cash MD    DIAGNOSIS: Severe symptomatic aortic stenosis.     POSTOP DIAGNOSIS: Severe symptomatic aortic stenosis.     PRESENT CO-MORBIDITIES: Severe AS, PAF, HTN, DM, CHF, CKD III, Syncope    PROCEDURE: Elective procedure in hybrid operating room     1. Transcatheter aortic valve replacement (TAVR) utilizing a 14 Cuban Sheath and a 26 mm Medtronic transcatheter heart valve  2. Percutaneous left femoral arterial access   3. Percutaneous right femoral arterial and venous access  4. Abdominal aortography and bilateral lower extremity angiography  5. Transvenous pacing using a 5-Cuban balloon-tip pacer  6. Supravalvular aortogram  8. Transthoracic echocardiogram  9. Arterial line placement      INDICATIONS FOR OPERATION: The patient is a 89-year-old with New York Heart Association Class III symptoms and STS score of 8.9%. The patient was determined to be best suited for TAVR by the multidisciplinary heart team due to age, comorbidities.    FDA TAVR INDICATIONS: The patient was judged to be suitable for TAVR by the multidisciplinary team as reviewed by two cardiac surgeons, an interventional cardiologist, and the rest of the TAVR team.  The patient, family and team were in agreement that the case would convert to an open surgical AVR in the event of unsuccessful TAVR. The patient’s co-morbidities would not preclude the expected benefit from correction of the aortic stenosis by TAVR based on the team discussion. The patient will be enrolled in the STS/ACC TAVR TVT registry.     DESCRIPTION: Dr. Orlando Cash (interventional cardiologist) and Dr. Sukhjinder Brody (cardiothoracic surgeon) performed the procedure together in all preoperative and operative aspects. The patient was taken to the hybrid OR. A radial art line, central venous access, and Bernal catheter were inserted preoperatively. Anesthesia was administered without  "apparent complication. The patient was prepped and draped in the usual sterile fashion.      Using a micropuncture technique, access was obtained in the left femoral artery and a microsheath was inserted.  Angiography through the microsheath confirmed good position of the arterial access point.  Two Perclose sutures were deployed in offset manner in the right femoral artery using the \"Preclose\" technique.  An 8 Portuguese sheath was inserted.      A similar technique was used to obtain access in the right common femoral artery.    A 6 Portuguese sheath was inserted.  Access was obtained in the right femoral vein and a 6 Portuguese sheath was inserted.   A 5 Portuguese pacing catheter was directed to the RV apex and was tested.  A 6 Portuguese pigtail was directed to the right coronary cusp.  Angiography was performed using 20 cc of contrast.     We inserted an 5 Fr FR 4 catheter to the aortic arch.  Through this, we inserted a Confita wire.  Over this we performed sequential dilation until a 14 Portuguese sheath could be inserted to the abdominal aorta.  We exchanged for an 6 Portuguese AL-1 catheter and crossed the valve easily using a straight wire.  We exchanged for a pigtail catheter in the LV, then optimized position in the LV apex.  We then inserted an confita guidewire.   The orientation of a 26 mm Medtronic TAVR valve was confirmed by multiple physicians, then was loaded in the sheath and was advanced to the descending aorta.  The valve was centered on the balloon easily, then was advanced across the aortic arch.     The THV was then placed and repositioned in the aortic annulus, and placement was confirmed by an aortogram. The co-operators were all in agreement for valve positioning prior to deployment. In a coordinated fashion, rapid ventricular pacing and the TAVR valve was deployed. Pacing was discontinued. We exchanged the TAVR delivery system for a pigtail catheter.  Proper valve placement, orientation and degree of regurgitation " was then evaluated by transthoracic echocardiogram and aortography. The co-operators agreed that no further intervention was necessary.    We then withdrew the Sheath to the distal external iliac artery with the wire still in place. From the contralateral side, the pigtail catheter was advanced into the distal abdomen and abdominal aortography was performed. All parties were in agreement that there was no flow-limiting vascular trauma or extravasation of dye, at which point closure of the arteriotomy was performed with our two Perclose devices.  The venous sheath and temporary pacemaker were then removed. Finally, our contralateral access was removed. The patient left in the care of the anesthesia team for hemodynamic monitoring. The patient was transported to the PACU for further monitoring and care.       1. Hemodynamics:  Post TAVR aortic gradient: 5 mmHg.  Post TAVR AI: none . Post CLIFTON: 1.9 cm.     CONTRAST USED: 95 mL.     FLUROSCOPY TIME:  8.9 min    Air KERMA:  [address]    EBL: minimal    SPECIMENS: none    CONCLUSIONS:  Successful deployment of a 26 mm Medtronic transcatheter aortic heart valve.

## 2024-04-19 NOTE — ANESTHESIA PREPROCEDURE EVALUATION
Anesthesia Evaluation     Patient summary reviewed   NPO Solid Status: > 8 hours  NPO Liquid Status: > 2 hours           Airway   Mallampati: III  TM distance: >3 FB  Neck ROM: full  Small opening and Possible difficult intubation  Dental    (+) upper dentures    Pulmonary    (-) pneumonia  Cardiovascular   Exercise tolerance: poor (<4 METS)    ECG reviewed    (+) hypertension, valvular problems/murmurs AS, CHF Diastolic >=55%, murmur, hyperlipidemia      Neuro/Psych  GI/Hepatic/Renal/Endo    (+) GERD, diabetes mellitus type 2, thyroid problem hypothyroidism  (-) no renal disease, GI bleed    Musculoskeletal     Abdominal    Substance History      OB/GYN          Other   blood dyscrasia anemia,                 Anesthesia Plan    ASA 4     MAC and [place]       Anesthetic plan, risks, benefits, and alternatives have been provided, discussed and informed consent has been obtained with: patient.  Pre-procedure education provided  Use of blood products discussed with patient  Consented to blood products.      CODE STATUS:

## 2024-04-20 ENCOUNTER — APPOINTMENT (OUTPATIENT)
Dept: CARDIOLOGY | Facility: HOSPITAL | Age: 89
End: 2024-04-20
Payer: MEDICARE

## 2024-04-20 ENCOUNTER — READMISSION MANAGEMENT (OUTPATIENT)
Dept: CALL CENTER | Facility: HOSPITAL | Age: 89
End: 2024-04-20
Payer: MEDICARE

## 2024-04-20 VITALS
BODY MASS INDEX: 29.45 KG/M2 | SYSTOLIC BLOOD PRESSURE: 151 MMHG | HEIGHT: 60 IN | HEART RATE: 61 BPM | OXYGEN SATURATION: 91 % | WEIGHT: 150 LBS | TEMPERATURE: 98.1 F | DIASTOLIC BLOOD PRESSURE: 54 MMHG | RESPIRATION RATE: 14 BRPM

## 2024-04-20 LAB
ANION GAP SERPL CALCULATED.3IONS-SCNC: 9.5 MMOL/L (ref 5–15)
AORTIC DIMENSIONLESS INDEX: 0.5 (DI)
BH CV ECHO MEAS - AO MAX PG: 17.2 MMHG
BH CV ECHO MEAS - AO MEAN PG: 8.3 MMHG
BH CV ECHO MEAS - AO ROOT DIAM: 2.7 CM
BH CV ECHO MEAS - AO V2 MAX: 207.3 CM/SEC
BH CV ECHO MEAS - AO V2 VTI: 47.2 CM
BH CV ECHO MEAS - AVA(I,D): 1.09 CM2
BH CV ECHO MEAS - EDV(CUBED): 104.7 ML
BH CV ECHO MEAS - EDV(MOD-SP2): 80 ML
BH CV ECHO MEAS - EDV(MOD-SP4): 75 ML
BH CV ECHO MEAS - EF(MOD-BP): 62.1 %
BH CV ECHO MEAS - EF(MOD-SP2): 62.5 %
BH CV ECHO MEAS - EF(MOD-SP4): 61.3 %
BH CV ECHO MEAS - ESV(CUBED): 18.1 ML
BH CV ECHO MEAS - ESV(MOD-SP2): 30 ML
BH CV ECHO MEAS - ESV(MOD-SP4): 29 ML
BH CV ECHO MEAS - FS: 44.3 %
BH CV ECHO MEAS - IVS/LVPW: 0.79 CM
BH CV ECHO MEAS - IVSD: 0.66 CM
BH CV ECHO MEAS - LAT PEAK E' VEL: 8.8 CM/SEC
BH CV ECHO MEAS - LV MASS(C)D: 113.4 GRAMS
BH CV ECHO MEAS - LV MAX PG: 4.1 MMHG
BH CV ECHO MEAS - LV MEAN PG: 2.14 MMHG
BH CV ECHO MEAS - LV V1 MAX: 100.9 CM/SEC
BH CV ECHO MEAS - LV V1 VTI: 25 CM
BH CV ECHO MEAS - LVIDD: 4.7 CM
BH CV ECHO MEAS - LVIDS: 2.6 CM
BH CV ECHO MEAS - LVOT AREA: 2.06 CM2
BH CV ECHO MEAS - LVOT DIAM: 1.62 CM
BH CV ECHO MEAS - LVPWD: 0.84 CM
BH CV ECHO MEAS - MED PEAK E' VEL: 6.7 CM/SEC
BH CV ECHO MEAS - MV A DUR: 0.13 SEC
BH CV ECHO MEAS - MV A MAX VEL: 38.7 CM/SEC
BH CV ECHO MEAS - MV DEC SLOPE: 481.4 CM/SEC2
BH CV ECHO MEAS - MV DEC TIME: 0.18 SEC
BH CV ECHO MEAS - MV E MAX VEL: 98.5 CM/SEC
BH CV ECHO MEAS - MV E/A: 2.5
BH CV ECHO MEAS - MV MAX PG: 4.9 MMHG
BH CV ECHO MEAS - MV MEAN PG: 1.33 MMHG
BH CV ECHO MEAS - MV P1/2T: 64.2 MSEC
BH CV ECHO MEAS - MV V2 VTI: 32.5 CM
BH CV ECHO MEAS - MVA(P1/2T): 3.4 CM2
BH CV ECHO MEAS - MVA(VTI): 1.59 CM2
BH CV ECHO MEAS - PA ACC TIME: 0.11 SEC
BH CV ECHO MEAS - PA V2 MAX: 114.1 CM/SEC
BH CV ECHO MEAS - PULM A REVS DUR: 0.08 SEC
BH CV ECHO MEAS - PULM A REVS VEL: 17.5 CM/SEC
BH CV ECHO MEAS - PULM DIAS VEL: 71 CM/SEC
BH CV ECHO MEAS - PULM S/D: 0.8
BH CV ECHO MEAS - PULM SYS VEL: 56.8 CM/SEC
BH CV ECHO MEAS - RAP SYSTOLE: 3 MMHG
BH CV ECHO MEAS - RV MAX PG: 4.8 MMHG
BH CV ECHO MEAS - RV V1 MAX: 109.2 CM/SEC
BH CV ECHO MEAS - RV V1 VTI: 23.4 CM
BH CV ECHO MEAS - RVSP: 61 MMHG
BH CV ECHO MEAS - SV(LVOT): 51.7 ML
BH CV ECHO MEAS - SV(MOD-SP2): 50 ML
BH CV ECHO MEAS - SV(MOD-SP4): 46 ML
BH CV ECHO MEAS - TAPSE (>1.6): 1.6 CM
BH CV ECHO MEAS - TR MAX PG: 58.1 MMHG
BH CV ECHO MEAS - TR MAX VEL: 381.1 CM/SEC
BH CV ECHO MEASUREMENTS AVERAGE E/E' RATIO: 12.71
BH CV XLRA - RV BASE: 2.8 CM
BH CV XLRA - RV LENGTH: 6.8 CM
BH CV XLRA - RV MID: 2.8 CM
BH CV XLRA - TDI S': 8.3 CM/SEC
BUN SERPL-MCNC: 21 MG/DL (ref 8–23)
BUN/CREAT SERPL: 17.8 (ref 7–25)
CALCIUM SPEC-SCNC: 8.8 MG/DL (ref 8.6–10.5)
CHLORIDE SERPL-SCNC: 108 MMOL/L (ref 98–107)
CO2 SERPL-SCNC: 23.5 MMOL/L (ref 22–29)
CREAT SERPL-MCNC: 1.18 MG/DL (ref 0.57–1)
DEPRECATED RDW RBC AUTO: 44.5 FL (ref 37–54)
EGFRCR SERPLBLD CKD-EPI 2021: 44.2 ML/MIN/1.73
ERYTHROCYTE [DISTWIDTH] IN BLOOD BY AUTOMATED COUNT: 14.4 % (ref 12.3–15.4)
GLUCOSE BLDC GLUCOMTR-MCNC: 117 MG/DL (ref 70–130)
GLUCOSE BLDC GLUCOMTR-MCNC: 91 MG/DL (ref 70–130)
GLUCOSE SERPL-MCNC: 108 MG/DL (ref 65–99)
HCT VFR BLD AUTO: 32.4 % (ref 34–46.6)
HGB BLD-MCNC: 10 G/DL (ref 12–15.9)
LEFT ATRIUM VOLUME INDEX: 31.4 ML/M2
MCH RBC QN AUTO: 26.2 PG (ref 26.6–33)
MCHC RBC AUTO-ENTMCNC: 30.9 G/DL (ref 31.5–35.7)
MCV RBC AUTO: 84.8 FL (ref 79–97)
PLATELET # BLD AUTO: 214 10*3/MM3 (ref 140–450)
PMV BLD AUTO: 9.3 FL (ref 6–12)
POTASSIUM SERPL-SCNC: 3.9 MMOL/L (ref 3.5–5.2)
QT INTERVAL: 465 MS
QTC INTERVAL: 461 MS
RBC # BLD AUTO: 3.82 10*6/MM3 (ref 3.77–5.28)
SODIUM SERPL-SCNC: 141 MMOL/L (ref 136–145)
WBC NRBC COR # BLD AUTO: 9.27 10*3/MM3 (ref 3.4–10.8)

## 2024-04-20 PROCEDURE — 85027 COMPLETE CBC AUTOMATED: CPT | Performed by: INTERNAL MEDICINE

## 2024-04-20 PROCEDURE — 80048 BASIC METABOLIC PNL TOTAL CA: CPT | Performed by: INTERNAL MEDICINE

## 2024-04-20 PROCEDURE — 82948 REAGENT STRIP/BLOOD GLUCOSE: CPT

## 2024-04-20 PROCEDURE — 93306 TTE W/DOPPLER COMPLETE: CPT

## 2024-04-20 PROCEDURE — 93306 TTE W/DOPPLER COMPLETE: CPT | Performed by: INTERNAL MEDICINE

## 2024-04-20 PROCEDURE — 99232 SBSQ HOSP IP/OBS MODERATE 35: CPT | Performed by: INTERNAL MEDICINE

## 2024-04-20 PROCEDURE — 99238 HOSP IP/OBS DSCHRG MGMT 30/<: CPT | Performed by: THORACIC SURGERY (CARDIOTHORACIC VASCULAR SURGERY)

## 2024-04-20 PROCEDURE — 93005 ELECTROCARDIOGRAM TRACING: CPT | Performed by: INTERNAL MEDICINE

## 2024-04-20 PROCEDURE — 93010 ELECTROCARDIOGRAM REPORT: CPT | Performed by: INTERNAL MEDICINE

## 2024-04-20 RX ORDER — HYDROCODONE BITARTRATE AND ACETAMINOPHEN 5; 325 MG/1; MG/1
1 TABLET ORAL EVERY 8 HOURS PRN
Qty: 12 TABLET | Refills: 0 | Status: SHIPPED | OUTPATIENT
Start: 2024-04-20 | End: 2024-04-24

## 2024-04-20 RX ORDER — LOSARTAN POTASSIUM 25 MG/1
25 TABLET ORAL 2 TIMES DAILY
Qty: 30 TABLET | Refills: 1 | Status: SHIPPED | OUTPATIENT
Start: 2024-04-20

## 2024-04-20 RX ORDER — ACETAMINOPHEN 325 MG/1
650 TABLET ORAL EVERY 4 HOURS PRN
Qty: 30 TABLET | Refills: 1 | Status: SHIPPED | OUTPATIENT
Start: 2024-04-20

## 2024-04-20 RX ORDER — ASPIRIN 81 MG/1
81 TABLET ORAL DAILY
Qty: 30 TABLET | Refills: 1 | Status: SHIPPED | OUTPATIENT
Start: 2024-04-21

## 2024-04-20 RX ORDER — LEVOTHYROXINE SODIUM 88 UG/1
88 TABLET ORAL
Qty: 30 TABLET | Refills: 1 | Status: SHIPPED | OUTPATIENT
Start: 2024-04-21

## 2024-04-20 RX ORDER — AMLODIPINE BESYLATE 2.5 MG/1
2.5 TABLET ORAL DAILY
Qty: 30 TABLET | Refills: 1 | Status: SHIPPED | OUTPATIENT
Start: 2024-04-21

## 2024-04-20 RX ADMIN — METOPROLOL SUCCINATE 12.5 MG: 25 TABLET, EXTENDED RELEASE ORAL at 09:11

## 2024-04-20 RX ADMIN — AMLODIPINE BESYLATE 2.5 MG: 5 TABLET ORAL at 09:11

## 2024-04-20 RX ADMIN — HYDROCODONE BITARTRATE AND ACETAMINOPHEN 1 TABLET: 5; 325 TABLET ORAL at 09:19

## 2024-04-20 RX ADMIN — AMIODARONE HYDROCHLORIDE 200 MG: 200 TABLET ORAL at 09:11

## 2024-04-20 RX ADMIN — LEVOTHYROXINE SODIUM 88 MCG: 88 TABLET ORAL at 06:56

## 2024-04-20 RX ADMIN — PANTOPRAZOLE SODIUM 40 MG: 40 TABLET, DELAYED RELEASE ORAL at 06:56

## 2024-04-20 RX ADMIN — ASPIRIN 81 MG: 81 TABLET, COATED ORAL at 09:11

## 2024-04-20 RX ADMIN — LOSARTAN POTASSIUM 25 MG: 25 TABLET, FILM COATED ORAL at 09:11

## 2024-04-20 NOTE — CASE MANAGEMENT/SOCIAL WORK
Case Management Discharge Note      Final Note: Home         Selected Continued Care - Discharged on 4/20/2024 Admission date: 4/19/2024 - Discharge disposition: Home or Self Care      Destination    No services have been selected for the patient.                Durable Medical Equipment    No services have been selected for the patient.                Dialysis/Infusion    No services have been selected for the patient.                Home Medical Care    No services have been selected for the patient.                Therapy    No services have been selected for the patient.                Community Resources    No services have been selected for the patient.                Community & DME    No services have been selected for the patient.                    Selected Continued Care - Episodes Includes continued care and service providers with selected services from the active episodes listed below      High Risk Care Management Episode start date: 4/8/2024   There are no active outsourced providers for this episode.             [facility] Episode start date: 11/7/2023   There are no active outsourced providers for this episode.                      Final Discharge Disposition Code: 01 - home or self-care

## 2024-04-20 NOTE — PROGRESS NOTES
"[facility] Follow Up    Chief Complaint: Follow up status post TAVR    Interval History: Complains of some left groin pain that is improving.  No chest pain or dyspnea.     Objective:     Objective:  Temp:  [97.7 °F (36.5 °C)-98.1 °F (36.7 °C)] 98.1 °F (36.7 °C)  Heart Rate:  [46-61] 61  Resp:  [12-14] 14  BP: (129-155)/(45-60) 151/54     Intake/Output Summary (Last 24 hours) at 4/20/2024 1359  Last data filed at 4/20/2024 1146  Gross per 24 hour   Intake 320 ml   Output 500 ml   Net -180 ml     Body mass index is 29.29 kg/m².      04/19/24  0749 04/20/24  0802   Weight: 68.3 kg (150 lb 9.2 oz) 68 kg (150 lb)     Weight change:       Physical Exam:   General : Alert, cooperative, in no acute distress.  Neuro: Alert,cooperative and oriented.  Lungs: CTAB. Normal respiratory effort and rate.  CV: Regular rate and rhythm, normal S1 and S2, no murmurs, gallops or rubs.  ABD: Soft, nontender, nondistended. Positive bowel sounds.  Extr: No edema or cyanosis, moves all extremities.    Lab Review:   Results from last 7 days   Lab Units 04/20/24  0341   SODIUM mmol/L 141   POTASSIUM mmol/L 3.9   CHLORIDE mmol/L 108*   CO2 mmol/L 23.5   BUN mg/dL 21   CREATININE mg/dL 1.18*   GLUCOSE mg/dL 108*   CALCIUM mg/dL 8.8         Results from last 7 days   Lab Units 04/20/24  0341 04/19/24  0943   WBC 10*3/mm3 9.27  --    HEMOGLOBIN g/dL 10.0*  --    HEMOGLOBIN, POC g/dL  --  11.2*   HEMATOCRIT % 32.4*  --    HEMATOCRIT POC %  --  33*   PLATELETS 10*3/mm3 214  --                    Invalid input(s): \"LDLCALC\"          I reviewed the patient's new clinical results.  I personally viewed and interpreted the patient's EKG  Current Medications:   Scheduled Meds:amiodarone, 200 mg, Oral, Daily  amLODIPine, 2.5 mg, Oral, Daily  aspirin, 81 mg, Oral, Daily  insulin lispro, 2-7 Units, Subcutaneous, 4x Daily AC & at Bedtime  levothyroxine, 88 mcg, Oral, Q AM  losartan, 25 mg, Oral, BID  metoprolol succinate XL, 12.5 mg, " Oral, Daily  pantoprazole, 40 mg, Oral, BID AC  rOPINIRole, 0.25 mg, Oral, Nightly  rosuvastatin, 10 mg, Oral, Nightly      Continuous Infusions:lactated ringers, 9 mL/hr, Last Rate: 9 mL/hr (04/19/24 0810)        Allergies:  Allergies   Allergen Reactions    Atorvastatin Calcium Myalgia    Digoxin Other (See Comments)     Digoxin toxicity     Neosporin [Bacitracin-Polymyxin B] Swelling       Assessment/Plan:     1.  Severe aortic valve stenosis.  Now status post TAVR with 26 mm evolute valve.  Echocardiogram completed this morning but report still pending.  On my review her valve appears to be functioning well with no significant gradients across the valve or evidence of regurgitation.  She reports some mild left groin pain but on exam there appears to be no issues.  2.  Hypertension  3.  Hyperlipidemia  4.  Chronic kidney disease stage III  5.  Chronic heart failure with preserved ejection fraction  6.  Paroxysmal atrial fibrillation.  Maintaining sinus rhythm on amiodarone.  Not on anticoagulation.  7.  Hypothyroidism    - She appears appropriate for discharge today.  Follow-up with Dr. Perez as scheduled.    Cookie Farias MD  04/20/24  13:59 EDT

## 2024-04-20 NOTE — OUTREACH NOTE
Prep Survey      Flowsheet Row Responses   [facility] patient discharged from? [facility]   Is LACE score < 7 ? No   Eligibility [facility]   Date of Admission 04/19/24   Date of Discharge 04/20/24   Discharge Disposition Home or Self Care   Discharge diagnosis Severe aortic valve stenosis -- s/p TAVR   Does the patient have one of the following disease processes/diagnoses(primary or secondary)? Cardiothoracic surgery   Does the patient have Home health ordered? No   Is there a DME ordered? No   Prep survey completed? Yes            KASSY VALDEZ - Registered Nurse

## 2024-04-20 NOTE — PROGRESS NOTES
POD #1 TAVR with 26 mm Medtronic evolute  Complains of mild discomfort in the left groin puncture site  Vitals are stable  Echo results showed mild paravalvular leak and good LV contractility  Cor without murmur, lungs are clear, left groin without hematoma but tenderness  Anticipate discharge soon if echocardiogram favorable

## 2024-04-22 ENCOUNTER — TELEPHONE (OUTPATIENT)
Dept: CARDIAC REHAB | Facility: HOSPITAL | Age: 89
End: 2024-04-22
Payer: MEDICARE

## 2024-04-22 ENCOUNTER — PATIENT OUTREACH (OUTPATIENT)
Dept: CASE MANAGEMENT | Facility: OTHER | Age: 89
End: 2024-04-22
Payer: MEDICARE

## 2024-04-22 ENCOUNTER — TRANSITIONAL CARE MANAGEMENT TELEPHONE ENCOUNTER (OUTPATIENT)
Dept: CALL CENTER | Facility: HOSPITAL | Age: 89
End: 2024-04-22
Payer: MEDICARE

## 2024-04-22 NOTE — OUTREACH NOTE
Call Center TCM Note      Flowsheet Row Responses   [facility] patient discharged from? [facility]   Does the patient have one of the following disease processes/diagnoses(primary or secondary)? Cardiothoracic surgery   TCM attempt successful? Yes   Call start time 1450   Call end time 1457   Is patient permission given to speak with other caregiver? Yes   Person spoke with today (if not patient) and relationship Nicole-daughter.   Meds reviewed with patient/caregiver? Yes   Is the patient having any side effects they believe may be caused by any medication additions or changes? No   Does the patient have all medications related to this admission filled (includes all antibiotics, pain medications, cardiac medications, etc.) Yes   Is the patient taking all medications as directed (includes completed medication regime)? Yes   Comments Daughter states wishes to keep previously scheduled appt with PCP for 05/07/24. Cardiology appt 04/29/24, cardiac electrophysiologist 05/02/24.   Does the patient have an appointment with their PCP within 7-14 days of discharge? No   Nursing Interventions Routed TCM call to PCP office, Patient declined scheduling/rescheduling appointment at this time   Has home health visited the patient within 72 hours of discharge? N/A   Psychosocial issues? No   Did the patient receive a copy of their discharge instructions? Yes   Nursing interventions Reviewed instructions with patient   What is the patient's perception of their health status since discharge? Improving   Nursing interventions Nurse provided patient education   Is the patient/caregiver able to teach back normal signs of recovery? Pain or discomfort at incisional site   Nursing interventions Reassured on normal signs of recovery   Is the patient /caregiver able to teach back basic post-op care? Shower daily, No tub bath, swimming, or hot tub until instructed by MD, Use a clean wash cloth and antibacterial bar or liquid soap to  clean incisions, If the steri-strips are falling off, it is okay to remove them. (If applicable), Lifting as instructed by MD in discharge instructions   Is the patient/caregiver able to teach back signs and symptoms of incisional infection? Increased redness, swelling or pain at the incisonal site, Increased drainage or bleeding, Incisional warmth, Pus or odor from incision, Fever   Is the patient/caregiver able to teach back steps to recovery at home? Set small, achievable goals for return to baseline health, Rest and rebuild strength, gradually increase activity, Practice good oral hygiene, Eat a well-balance diet   Is the patient /caregiver able to teach back the importance of cardiac rehab? Yes   Nursing interventions Provided education on importance of cardiac rehab   If the patient is a current smoker, are they able to teach back resources for cessation? Not a smoker   Is the patient/caregiver able to teach back the hierarchy of who to call/visit for symptoms/problems? PCP, Specialist, Home health nurse, Urgent Care, ED, 911 Yes   Additional teach back comments Daughter states patient monitors her BG and weight. Discussed monitoring BP/HR at home.   TCM call completed? Yes   Wrap up additional comments Daughter states patient is slowly improving. States groin sites clean, intact without s/s of infection. States weight steady with no edema noted. Denies any needs or concerns today. TCM complete.   Call end time [id number]   Would this patient benefit from a Referral to Amb Social Work? No   Is the patient interested in additional calls from an ambulatory ? No            eFlipa Ascencio RN    4/22/2024, 14:59 EDT

## 2024-04-22 NOTE — TELEPHONE ENCOUNTER
I just spoke with patient's daughter, Nicole.  She states that her mother does not drive anymore and would not be able to attend cardiac rehab due to transportation issues.  I reviewed the AHA exercise guidelines with her daughter who states that she does have a nice area to walk near her home.  She will encourage her to do some walking.    Thank you for the referral!

## 2024-04-22 NOTE — OUTREACH NOTE
"AMBULATORY CASE MANAGEMENT NOTE    Names and Relationships of Patient/Support Persons: Caller: Briseida Antony \"Brunilda\"; Relationship: Self -     Patient Outreach    Introduced self, explained ACM RN role and provided contact information. Spoke with patient's daughter. Patient recovering from TAVR performed on Friday. Home and resting. No needs at this time. Patient has follow up with Dr. Wheat in a few weeks. Follow up review scheduled in 1 month to review [facility] survey sent via [employer].     Education Documentation  No documentation found.        Naomy MOSCOSO  Ambulatory Case Management    4/22/2024, 17:03 EDT  "

## 2024-04-22 NOTE — DISCHARGE SUMMARY
[facility] Cardiac Surgery Discharge Summary    Date of Admission: 4/19/2024  Date of Discharge:  4/20/2024    Discharge Diagnosis:   Severe symptomatic aortic stenosis status post TAVR by Dr. Brody and Dr. Cash  Diastolic heart failure  Paroxysmal atrial fibrillation not on AC due to bleeding risk  Hyperlipidemia  Pulmonary hypertension  Diabetes mellitus  Hypothyroid  EKG    Presenting Problem/History of Present Illness  Nonrheumatic aortic valve stenosis [I35.0]  Aortic stenosis [I35.0]     Hospital Course  Patient is a 89 y.o. female presented with above medical history and worsening symptoms from her aortic stenosis.  After having appropriate preoperative workup she was scheduled for surgery.  She was admitted the morning of the procedure.  On 4/19/2024 she underwent TAVR using 26 mm Medtronic valve by Dr. Brody and Dr. Cash.  She tolerated the procedure was transferred to recovery.  Full details can be found in operative report.  She remained stable and was able to be transferred to the stepdown unit shortly after the procedure on postop day 0.  She did well overnight.  On postop day 1 postop TAVR echo was completed and reviewed by cardiology.  Per report it showed normal functioning valve and mild paravalvular regurgitation.  She was seen by Dr. Rashid on postop day 1 and cleared for discharge.  Plan discharge home with family.  Time of discharge she is HD stable, ambulating, and on room air. Patient was provided with appropriate discharge education. She was instructed to call office with any questions or concerns.  She will follow-up as directed by TAVR coordinator.       Procedures Performed  Procedure(s):  TRANSFEMORAL TRANSCATHETER AORTIC VALVE REPLACEMENT with intraoperative transthoracic echocardiogram and possible open surgical rescue  Transfemoral Transcatheter Aortic Valve Replacement with intraoperative transthoracic echocardiogram and possible open surgical  rescue    4/19/204  TAVR OPERATIVE REPORT     CO-SURGEON: Sukhjinder Brody MD  CO-SURGEON: Orlando Cash MD     DIAGNOSIS: Severe symptomatic aortic stenosis.      POSTOP DIAGNOSIS: Severe symptomatic aortic stenosis.      PRESENT CO-MORBIDITIES: Severe AS, PAF, HTN, DM, CHF, CKD III, Syncope     PROCEDURE: Elective procedure in hybrid operating room      1. Transcatheter aortic valve replacement (TAVR) utilizing a 14 Italian Sheath and a 26 mm Medtronic transcatheter heart valve  2. Percutaneous left femoral arterial access   3. Percutaneous right femoral arterial and venous access  4. Abdominal aortography and bilateral lower extremity angiography  5. Transvenous pacing using a 5-Italian balloon-tip pacer  6. Supravalvular aortogram  8. Transthoracic echocardiogram  9. Arterial line placement          Consults:   Consults       No orders found from 3/21/2024 to 4/20/2024.            Pertinent Test Results:    Post-op TAVR echo  Left Ventricle Left ventricular systolic function is normal. Calculated left ventricular EF = 62.1%     Normal left ventricular cavity size and wall thickness noted. All left ventricular wall segments contract normally. Left ventricular diastolic function was indeterminate.   Right Ventricle Normal right ventricular cavity size and systolic function noted.   Left Atrium The left atrial cavity is moderately dilated.   Right Atrium The right atrial cavity is mild to moderately dilated.   Aortic Valve There is mild anterior paravalvular regurgitation Aortic valve area is 1.09 cm2. Peak velocity of the flow distal to the aortic valve is 207.3 cm/s. Aortic valve maximum pressure gradient is 17.2 mmHg. Aortic valve mean pressure gradient is 8.3 mmHg. Aortic valve dimensionless index is 0.50 . There is a 26 mm TAVR valve present. The aortic valve peak and mean gradients are within defined limits. The prosthetic aortic valve is not well visualized. Medtronic Evolute   Mitral Valve The mitral  valve is grossly normal in structure. Trace mitral valve regurgitation is present. No significant mitral valve stenosis is present.   Tricuspid Valve The tricuspid valve is grossly normal in structure. Mild to moderate tricuspid valve regurgitation is present. Estimated right ventricular systolic pressure from tricuspid regurgitation is markedly elevated (>55 mmHg). Calculated right ventricular systolic pressure from tricuspid regurgitation is 61 mmHg. No evidence of significant tricuspid valve stenosis is present.   Pulmonic Valve The pulmonic valve is grossly normal in structure. There is trace pulmonic valve regurgitation present. There is no pulmonic valve stenosis present.   Greater Vessels No dilation of the aortic root is present.   Pericardium There is no evidence of pericardial effusion. .       Lab Results   Component Value Date    WBC 9.27 04/20/2024    HGB 10.0 (L) 04/20/2024    HCT 32.4 (L) 04/20/2024    MCV 84.8 04/20/2024     04/20/2024      Lab Results   Component Value Date    GLUCOSE 108 (H) 04/20/2024    CALCIUM 8.8 04/20/2024     04/20/2024    K 3.9 04/20/2024    CO2 23.5 04/20/2024     (H) 04/20/2024    BUN 21 04/20/2024    CREATININE 1.18 (H) 04/20/2024    EGFRIFAFRI 57 (L) 12/06/2021    EGFRIFNONA 49 (L) 12/06/2021    BCR 17.8 04/20/2024    ANIONGAP 9.5 04/20/2024     Lab Results   Component Value Date    INR 1.15 (H) 04/12/2024    PROTIME 14.9 (H) 04/12/2024         Condition on Discharge:    stable    Vital Signs         Discharge Disposition  Home or Self Care    Discharge Medications     Discharge Medications        New Medications        Instructions Start Date   acetaminophen 325 MG tablet  Commonly known as: TYLENOL   650 mg, Oral, Every 4 Hours PRN      Aspirin Low Dose 81 MG EC tablet  Generic drug: aspirin   81 mg, Oral, Daily      HYDROcodone-acetaminophen 5-325 MG per tablet  Commonly known as: NORCO   1 tablet, Oral, Every 8 Hours PRN             Changes to  Medications        Instructions Start Date   ferrous gluconate 324 MG tablet  Commonly known as: FERGON  What changed:   how much to take  how to take this  additional instructions   Take 1 tablet by mouth 5(five) times a week with breakfast.      losartan 25 MG tablet  Commonly known as: COZAAR  What changed: additional instructions   25 mg, Oral, 2 Times Daily      metoprolol succinate XL 25 MG 24 hr tablet  Commonly known as: TOPROL-XL  What changed: when to take this   12.5 mg, Oral, Every 24 Hours Scheduled      vitamin D 1.25 MG (29048 UT) capsule capsule  Commonly known as: ERGOCALCIFEROL  What changed: additional instructions   50,000 Units, Oral, Weekly             Continue These Medications        Instructions Start Date   Accu-Chek Michelle Plus w/Device kit   Use to check glucose once daily . DM2/E11.9      accu-chek multiclix lancets   Use to check glucose once daily .  DM/ e11.9      amiodarone 200 MG tablet  Commonly known as: PACERONE   200 mg, Oral, Daily      amLODIPine 2.5 MG tablet  Commonly known as: NORVASC   2.5 mg, Oral, Daily      empagliflozin 10 MG tablet tablet  Commonly known as: Jardiance   10 mg, Oral, Daily      glucose blood test strip  Commonly known as: Accu-Chek Michelle Plus   Use to check glucose once daily . DM/ e11.9      levothyroxine 88 MCG tablet  Commonly known as: SYNTHROID, LEVOTHROID   88 mcg, Oral, Every Early Morning      pantoprazole 40 MG EC tablet  Commonly known as: PROTONIX   40 mg, Oral, 2 Times Daily Before Meals      rOPINIRole 0.25 MG tablet  Commonly known as: REQUIP   0.25 mg, Oral, Nightly, Take 1 hour before bedtime.      rosuvastatin 10 MG tablet  Commonly known as: CRESTOR   10 mg, Oral, Nightly      vitamin B-12 1000 MCG tablet  Commonly known as: CYANOCOBALAMIN   1,000 mcg, Oral, Daily               Discharge Diet:   Heart healthy    Activity at Discharge:   Activity Instructions    Activity as tolerated          Shower daily. Clean incisions with warm  water and antibacterial soap only. Do not put any lotion or ointments on incisions.  Ambulate for 10 minutes at least 3 times a day.  No heavy lifting > 10lbs until seen in office.   Take all medications as prescribed.     Follow-up Appointments:  Future Appointments   Date Time Provider Department Center   4/29/2024 12:30 PM Luisa Garcia APRN [facility]   5/2/2024 10:30 AM Winston Cunningham MD [id number] None   5/7/2024 10:45 AM Préez Wheat MD [facility]  [facility]   5/30/2024  2:45 PM [facility] ECHO/VAS RM 1 [facility] ECHO [facility]   5/30/2024  3:30 PM Orlando Cash MD [id number] None   9/3/2024  8:50 AM [facility]  [facility]   9/9/2024  9:30 AM Kaleigh Fagan APRN [facility]  [facility]   3/31/2025 12:00 PM [facility] ECHO/VAS FRONT RM [facility] ECHO [facility]   3/31/2025 12:45 PM Orlando Cash MD [facility]     Additional Instructions for the Follow-ups that You Need to Schedule       Ambulatory Referral to Cardiac Rehab   As directed                Test Results Pending at Discharge:  none         Andry Downs PA-C  04/22/24  12:34 EDT

## 2024-04-29 ENCOUNTER — OFFICE VISIT (OUTPATIENT)
Dept: CARDIOLOGY | Facility: CLINIC | Age: 89
End: 2024-04-29
Payer: MEDICARE

## 2024-04-29 VITALS
HEART RATE: 52 BPM | SYSTOLIC BLOOD PRESSURE: 134 MMHG | HEIGHT: 64 IN | BODY MASS INDEX: 25.61 KG/M2 | DIASTOLIC BLOOD PRESSURE: 60 MMHG | OXYGEN SATURATION: 97 % | WEIGHT: 150 LBS

## 2024-04-29 DIAGNOSIS — Z95.2 S/P TAVR (TRANSCATHETER AORTIC VALVE REPLACEMENT): Primary | ICD-10-CM

## 2024-04-29 NOTE — PROGRESS NOTES
[facility] Follow Up Office Note     Encounter Date:24  Patient:Briseida Antony  :1934  MRN:[id number]      Chief Complaint: No chief complaint on file.        History of Presenting Illness:        Briseida Antony is a 89 y.o. female who is here for follow-up.  She is a patient of Dr Salinas.    Patient has past medical history that is significant for nonrheumatic aortic stenosis, hypertension, hyperlipidemia, DM2, paroxysmal atrial fibrillation not on anticoagulation due to clotting risk.    Patient recently had TAVR with 26 mm [employer] valve on 2024.  Overall she did well postprocedure and echocardiogram on postop day 1 showed normal valve function with mild paravalvular leak.  She was discharged on postop day 1.    Patient is accompanied by her daughter today.  Her swelling is improved as well as breathing since her valve replacement.  She does not have concerns at groin stick sites.  She is wondering when she can start back to her exercises.      Review of Systems:  Review of Systems   Constitutional: Positive for malaise/fatigue.   Cardiovascular:  Positive for dyspnea on exertion. Negative for chest pain, leg swelling, orthopnea and palpitations.   Respiratory:  Negative for shortness of breath.        Current Outpatient Medications on File Prior to Visit   Medication Sig Dispense Refill    acetaminophen (TYLENOL) 325 MG tablet Take 2 tablets by mouth Every 4 (Four) Hours As Needed for Mild Pain or Fever (temperature greater than 101F). 30 tablet 1    amiodarone (PACERONE) 200 MG tablet Take 1 tablet by mouth Daily. 30 tablet 5    amLODIPine (NORVASC) 2.5 MG tablet Take 1 tablet by mouth Daily. 30 tablet 1    aspirin 81 MG EC tablet Take 1 tablet by mouth Daily. 30 tablet 1    Blood Glucose Monitoring Suppl (ACCU-CHEK HU PLUS) w/Device kit Use to check glucose once daily . DM2/E11.9 1 kit 0    empagliflozin (Jardiance) 10 MG tablet tablet Take 1 tablet by mouth Daily. 30 tablet 11     ferrous gluconate (FERGON) 324 MG tablet Take 1 tablet by mouth 5(five) times a week with breakfast. (Patient taking differently: Take 1 tablet by mouth. Take 1 tablet by mouth 5(five) times a week with breakfast.  MONDAY THROUGH FRIDAY) 20 tablet 5    glucose blood (Accu-Chek Michelle Plus) test strip Use to check glucose once daily . DM/ e11.9 100 each 11    Lancets (ACCU-CHEK MULTICLIX) lancets Use to check glucose once daily .  DM/ e11.9 100 each 12    levothyroxine (SYNTHROID, LEVOTHROID) 88 MCG tablet Take 1 tablet by mouth Every Morning. 30 tablet 1    losartan (COZAAR) 25 MG tablet Take 1 tablet by mouth 2 (Two) Times a Day. 30 tablet 1    metoprolol succinate XL (TOPROL-XL) 25 MG 24 hr tablet Take 0.5 tablets by mouth Daily. (Patient taking differently: Take 0.5 tablets by mouth Daily.) 15 tablet 5    pantoprazole (PROTONIX) 40 MG EC tablet Take 1 tablet by mouth 2 (Two) Times a Day Before Meals. 60 tablet 5    rOPINIRole (REQUIP) 0.25 MG tablet Take 1 tablet by mouth Every Night. Take 1 hour before bedtime. 30 tablet 5    rosuvastatin (CRESTOR) 10 MG tablet Take 1 tablet by mouth Every Night. 30 tablet 5    vitamin B-12 (CYANOCOBALAMIN) 1000 MCG tablet Take 1 tablet by mouth Daily. 30 tablet 5    vitamin D (ERGOCALCIFEROL) 1.25 MG (08354 UT) capsule capsule Take 1 capsule by mouth 1 (One) Time Per Week. 5 capsule 3     No current facility-administered medications on file prior to visit.       Allergies   Allergen Reactions    Atorvastatin Calcium Myalgia    Digoxin Other (See Comments)     Digoxin toxicity     Neosporin [Bacitracin-Polymyxin B] Swelling       Past Medical History:   Diagnosis Date    Anemia     Aortic valve stenosis     Arthritis     Atrial fibrillation     CHF (congestive heart failure)     Chronic kidney disease     FOLLOWED BY PCP ONLY    Diabetes mellitus     GERD (gastroesophageal reflux disease)     Heart murmur     History of COVID-19 12/2023    HOSPITALIZED WITH COVID PNEUMONIA     History of syncope     Hyperlipidemia     Hypertension     Hypothyroidism     Multiple falls     Restless legs syndrome        Past Surgical History:   Procedure Laterality Date    ANKLE OPEN REDUCTION INTERNAL FIXATION Left 08/27/2023    Procedure: LEFT ANKLE OPEN REDUCTION INTERNAL FIXATION;  Surgeon: Tonny Walter II, MD;  Location: [facility] MAIN OR;  Service: Orthopedics;  Laterality: Left;    CARDIAC CATHETERIZATION N/A 3/29/2024    Procedure: Right and Left Heart Cath;  Surgeon: Orlando Cash MD;  Location: [facility] CATH INVASIVE LOCATION;  Service: Cardiovascular;  Laterality: N/A;    CARDIAC CATHETERIZATION N/A 3/29/2024    Procedure: Coronary angiography;  Surgeon: Orlando Cash MD;  Location: [facility] CATH INVASIVE LOCATION;  Service: Cardiovascular;  Laterality: N/A;    CATARACT EXTRACTION EXTRACAPSULAR W/ INTRAOCULAR LENS IMPLANTATION Bilateral     CHOLECYSTECTOMY WITH INTRAOPERATIVE CHOLANGIOGRAM N/A 08/11/2023    Procedure: CHOLECYSTECTOMY LAPAROSCOPIC INTRAOPERATIVE CHOLANGIOGRAM;  Surgeon: Lorena Olivares MD;  Location: [facility] MAIN OR;  Service: General;  Laterality: N/A;    COLONOSCOPY N/A 08/26/2023    Procedure: COLONOSCOPY TO CECUM/TI;  Surgeon: Juan M Mckoy MD;  Location: [facility] ENDOSCOPY;  Service: Gastroenterology;  Laterality: N/A;  pre: ANEMIA, GI BLEED  post: FAIR PREP, HEMORRHOIDS    ENDOSCOPY N/A 08/25/2023    Procedure: ESOPHAGOGASTRODUODENOSCOPY;  Surgeon: Orlando Pang MD;  Location: [facility] ENDOSCOPY;  Service: Gastroenterology;  Laterality: N/A;  Pre - GI Bleed    ERCP N/A 08/12/2023    Procedure: ENDOSCOPIC RETROGRADE CHOLANGIOPANCREATOGRAPHY with sphincterotomy and balloon sweep;  Surgeon: Orlando Huang MD;  Location: [facility] ENDOSCOPY;  Service: Gastroenterology;  Laterality: N/A;  PRE/POST - cbd stones    HARDWARE REMOVAL Left 10/02/2023    Procedure: LEFT ANKLE HARDWARE REMOVAL;  Surgeon: Tonny Walter II, MD;  Location: [facility] OR OSC;   "Service: Orthopedics;  Laterality: Left;    KNEE ARTHROSCOPY Right     SKIN CANCER EXCISION Left 2019    Left Malar       Social History     Socioeconomic History    Marital status:      Spouse name: Neel    Number of children: 4   Tobacco Use    Smoking status: Former     Current packs/day: 0.00     Types: Cigarettes     Start date: 1946     Quit date: 1961     Years since quittin.0     Passive exposure: Past    Smokeless tobacco: Never    Tobacco comments:     1/2 PPD X 15 YEARS    Vaping Use    Vaping status: Never Used   Substance and Sexual Activity    Alcohol use: Not Currently    Drug use: No    Sexual activity: Defer       Family History   Problem Relation Age of Onset    Hypertension Mother     Hypertension Father     Heart attack Father 55         at 55    Hyperlipidemia Father     Hyperlipidemia Sister     Hypertension Sister     Leukemia Sister     Coronary artery disease Brother         s/p cabg    Hypertension Brother     Breast cancer Daughter     Ovarian cancer Daughter     Colon cancer Neg Hx     Malig Hyperthermia Neg Hx        The following portions of the patient's history were reviewed and updated as appropriate: allergies, current medications, past family history, past medical history, past social history, past surgical history and problem list.       Objective:       Vitals:    24 1232   BP: 134/60   Pulse: 52   SpO2: 97%   Weight: 68 kg (150 lb)   Height: 162.6 cm (64\")         Physical Exam:  Constitutional: Pleasant, alert and conversant  HENT: Oropharynx clear and membrane moist  Eyes: Normal conjunctiva, no sclera icterus  Neck: Supple, no carotid bruit bilaterally  Cardiovascular: Regular rate and rhythm, No Murmur, Trace bilateral lower extremity edema  Pulmonary: Normal respiratory effort, normal lung sounds, no wheezing  Neurological: Alert and orient x 3  Skin: Warm, dry, no ecchymosis, no rash  Psych: Appropriate mood and affect. Normal judgment " and insight         Lab Results   Component Value Date     04/20/2024     04/12/2024    K 3.9 04/20/2024    K 4.4 04/12/2024     (H) 04/20/2024     04/12/2024    CO2 23.5 04/20/2024    CO2 23.6 04/12/2024    BUN 21 04/20/2024    BUN 21 04/12/2024    CREATININE 1.18 (H) 04/20/2024    CREATININE 0.79 04/12/2024    EGFRIFNONA 49 (L) 12/06/2021    EGFRIFNONA 52 (L) 09/09/2020    EGFRIFAFRI 57 (L) 12/06/2021    EGFRIFAFRI 62 09/09/2020    GLUCOSE 108 (H) 04/20/2024    GLUCOSE 100 (H) 04/12/2024    CALCIUM 8.8 04/20/2024    CALCIUM 9.3 04/12/2024    PROTENTOTREF 7.7 12/06/2021    PROTENTOTREF 6.5 09/09/2020    ALBUMIN 3.8 04/12/2024    ALBUMIN 3.4 (L) 02/26/2024    BILITOT 0.5 04/12/2024    BILITOT 0.6 02/26/2024    AST 19 04/12/2024    AST 18 02/26/2024    ALT 15 04/12/2024    ALT 12 02/26/2024     Lab Results   Component Value Date    WBC 9.27 04/20/2024    WBC 7.82 04/12/2024    HGB 10.0 (L) 04/20/2024    HGB 11.2 (L) 04/19/2024    HCT 32.4 (L) 04/20/2024    HCT 33 (L) 04/19/2024    MCV 84.8 04/20/2024    MCV 86.3 04/12/2024     04/20/2024     04/12/2024     Lab Results   Component Value Date    CHOL 157 06/20/2023    TRIG 114 06/20/2023    TRIG 75 12/06/2021    HDL 45 06/20/2023    HDL 49 12/06/2021    LDL 91 06/20/2023    LDL 50 12/06/2021     Lab Results   Component Value Date    PROBNP 942.0 04/12/2024    PROBNP 1,486.0 02/26/2024     Lab Results   Component Value Date    TROPONINT 45 (H) 12/16/2023     Lab Results   Component Value Date    TSH 1.800 02/26/2024    TSH 3.030 02/12/2024           ECG 12 Lead    Date/Time: 4/29/2024 1:07 PM  Performed by: Luisa Garcia APRN    Authorized by: Luisa Garcia APRN  Comparison: compared with previous ECG from 4/20/2024  Similar to previous ECG  Rhythm: sinus rhythm  Rate: normal  BPM: 52             Assessment:          Diagnosis Plan   1. S/P TAVR (transcatheter aortic valve replacement)  ECG 12 Lead             Plan:           Nonrheumatic aortic stenosis status post TAVR - Echocardiogram postop with well-seated valve with peak and mean gradients within defined limits, mild paravalvular leak. Appears compensated on exam today, patient has mild lower extremity edema.  Groin sites healing appropriately. Continue aspirin. Discussed dental prophylaxis as she has dentures.  He has been referred to cardiac rehab    Paroxysmal atrial fibrillation -she is well-controlled on amiodarone.  She is not on anticoagulation due to bleeding risk.  She denies recent palpitations and feels she has been well-controlled.  She is scheduled to see Dr. Cunningham on 5/2.  They are interested in deferring this as she just had valve replacement which I think is reasonable since she is doing well from this perspective    Chronic systolic and diastolic congestive heart failure -she appears to be doing well today.  We discussed keeping a dry weight of 148 pounds and taking as needed Lasix for weight gain of 3 pounds at baseline.  If she is taking Lasix multiple days in a row she should call our office for further recommendations    Pulmonary hypertension -continue as needed diuretics    Patient is seen today for follow-up after recent TAVR procedure.  I think she is stable from a cardiac perspective.  We discussed continuing as needed Lasix for weight gain with a dry weight of 148 pounds.  Groin sites are healing appropriately and I think she can go back to doing her leg exercises that she has been doing since her ankle fracture.  Follow-up as scheduled with Dr. Cash.  Defer EP appointment for now as she recovers from TAVR    Orders Placed This Encounter   Procedures    ECG 12 Lead     This order was created via procedure documentation     Order Specific Question:   Release to patient     Answer:   Routine Release [[id number]]            HOLLEY Camejo  [facility]  04/29/24  13:07 EDT

## 2024-05-07 ENCOUNTER — OFFICE VISIT (OUTPATIENT)
Dept: INTERNAL MEDICINE | Age: 89
End: 2024-05-07
Payer: MEDICARE

## 2024-05-07 ENCOUNTER — READMISSION MANAGEMENT (OUTPATIENT)
Dept: CALL CENTER | Facility: HOSPITAL | Age: 89
End: 2024-05-07
Payer: MEDICARE

## 2024-05-07 VITALS
WEIGHT: 151 LBS | OXYGEN SATURATION: 97 % | TEMPERATURE: 97.1 F | DIASTOLIC BLOOD PRESSURE: 80 MMHG | SYSTOLIC BLOOD PRESSURE: 148 MMHG | HEART RATE: 51 BPM | BODY MASS INDEX: 25.78 KG/M2 | HEIGHT: 64 IN

## 2024-05-07 DIAGNOSIS — N18.31 STAGE 3A CHRONIC KIDNEY DISEASE (CKD): Chronic | ICD-10-CM

## 2024-05-07 DIAGNOSIS — I10 PRIMARY HYPERTENSION: Chronic | ICD-10-CM

## 2024-05-07 DIAGNOSIS — R54 FRAILTY: ICD-10-CM

## 2024-05-07 DIAGNOSIS — N18.31 TYPE 2 DIABETES MELLITUS WITH STAGE 3A CHRONIC KIDNEY DISEASE, WITHOUT LONG-TERM CURRENT USE OF INSULIN: Primary | Chronic | ICD-10-CM

## 2024-05-07 DIAGNOSIS — I50.30 HEART FAILURE WITH PRESERVED EJECTION FRACTION, BORDERLINE, CLASS II: Chronic | ICD-10-CM

## 2024-05-07 DIAGNOSIS — E11.22 TYPE 2 DIABETES MELLITUS WITH STAGE 3A CHRONIC KIDNEY DISEASE, WITHOUT LONG-TERM CURRENT USE OF INSULIN: Primary | Chronic | ICD-10-CM

## 2024-05-07 PROCEDURE — 1159F MED LIST DOCD IN RCRD: CPT | Performed by: INTERNAL MEDICINE

## 2024-05-07 PROCEDURE — 99214 OFFICE O/P EST MOD 30 MIN: CPT | Performed by: INTERNAL MEDICINE

## 2024-05-07 PROCEDURE — 1126F AMNT PAIN NOTED NONE PRSNT: CPT | Performed by: INTERNAL MEDICINE

## 2024-05-07 PROCEDURE — 1160F RVW MEDS BY RX/DR IN RCRD: CPT | Performed by: INTERNAL MEDICINE

## 2024-05-07 PROCEDURE — G2211 COMPLEX E/M VISIT ADD ON: HCPCS | Performed by: INTERNAL MEDICINE

## 2024-05-22 RX ORDER — LEVOTHYROXINE SODIUM 88 UG/1
88 TABLET ORAL EVERY MORNING
Qty: 90 TABLET | Refills: 1 | Status: SHIPPED | OUTPATIENT
Start: 2024-05-22

## 2024-05-30 ENCOUNTER — HOSPITAL ENCOUNTER (OUTPATIENT)
Dept: CARDIOLOGY | Facility: HOSPITAL | Age: 89
Discharge: HOME | End: 2024-05-30
Payer: MEDICARE

## 2024-05-30 ENCOUNTER — OFFICE VISIT (OUTPATIENT)
Age: 89
End: 2024-05-30
Payer: MEDICARE

## 2024-05-30 ENCOUNTER — LAB (OUTPATIENT)
Dept: LAB | Facility: HOSPITAL | Age: 89
End: 2024-05-30
Payer: MEDICARE

## 2024-05-30 VITALS
HEART RATE: 50 BPM | BODY MASS INDEX: 25.78 KG/M2 | HEIGHT: 64 IN | DIASTOLIC BLOOD PRESSURE: 80 MMHG | SYSTOLIC BLOOD PRESSURE: 168 MMHG | WEIGHT: 151 LBS

## 2024-05-30 VITALS
HEIGHT: 64 IN | WEIGHT: 151 LBS | BODY MASS INDEX: 25.78 KG/M2 | DIASTOLIC BLOOD PRESSURE: 70 MMHG | SYSTOLIC BLOOD PRESSURE: 168 MMHG | HEART RATE: 51 BPM

## 2024-05-30 DIAGNOSIS — I50.32 HEART FAILURE DUE TO VALVULAR DISEASE, CHRONIC, DIASTOLIC: ICD-10-CM

## 2024-05-30 DIAGNOSIS — I35.0 AORTIC STENOSIS, SEVERE: ICD-10-CM

## 2024-05-30 DIAGNOSIS — Z95.2 S/P TAVR (TRANSCATHETER AORTIC VALVE REPLACEMENT): Primary | ICD-10-CM

## 2024-05-30 DIAGNOSIS — I38 HEART FAILURE DUE TO VALVULAR DISEASE, CHRONIC, DIASTOLIC: ICD-10-CM

## 2024-05-30 DIAGNOSIS — Z95.2 S/P TAVR (TRANSCATHETER AORTIC VALVE REPLACEMENT): ICD-10-CM

## 2024-05-30 LAB
ALBUMIN SERPL-MCNC: 3.4 G/DL (ref 3.5–5.2)
ALBUMIN/GLOB SERPL: 1.1 G/DL
ALP SERPL-CCNC: 96 U/L (ref 39–117)
ALT SERPL W P-5'-P-CCNC: 21 U/L (ref 1–33)
ANION GAP SERPL CALCULATED.3IONS-SCNC: 11.1 MMOL/L (ref 5–15)
AORTIC ARCH: 3 CM
AORTIC DIMENSIONLESS INDEX: 0.9 (DI)
AST SERPL-CCNC: 21 U/L (ref 1–32)
BH CV ECHO MEAS - ACS: 1.07 CM
BH CV ECHO MEAS - AO MAX PG: 5.1 MMHG
BH CV ECHO MEAS - AO MEAN PG: 2.8 MMHG
BH CV ECHO MEAS - AO ROOT DIAM: 2.43 CM
BH CV ECHO MEAS - AO V2 MAX: 113.3 CM/SEC
BH CV ECHO MEAS - AO V2 VTI: 31 CM
BH CV ECHO MEAS - AVA(I,D): 1.87 CM2
BH CV ECHO MEAS - EDV(CUBED): 90 ML
BH CV ECHO MEAS - EDV(MOD-SP2): 71 ML
BH CV ECHO MEAS - EDV(MOD-SP4): 76 ML
BH CV ECHO MEAS - EF(MOD-BP): 61.7 %
BH CV ECHO MEAS - EF(MOD-SP2): 63.4 %
BH CV ECHO MEAS - EF(MOD-SP4): 59.2 %
BH CV ECHO MEAS - ESV(CUBED): 23.7 ML
BH CV ECHO MEAS - ESV(MOD-SP2): 26 ML
BH CV ECHO MEAS - ESV(MOD-SP4): 31 ML
BH CV ECHO MEAS - FS: 35.9 %
BH CV ECHO MEAS - IVS/LVPW: 1.07 CM
BH CV ECHO MEAS - IVSD: 0.93 CM
BH CV ECHO MEAS - LAT PEAK E' VEL: 5.1 CM/SEC
BH CV ECHO MEAS - LV DIASTOLIC VOL/BSA (35-75): 43.8 CM2
BH CV ECHO MEAS - LV MASS(C)D: 131.7 GRAMS
BH CV ECHO MEAS - LV MAX PG: 4.2 MMHG
BH CV ECHO MEAS - LV MEAN PG: 2.1 MMHG
BH CV ECHO MEAS - LV SYSTOLIC VOL/BSA (12-30): 17.9 CM2
BH CV ECHO MEAS - LV V1 MAX: 102.2 CM/SEC
BH CV ECHO MEAS - LV V1 VTI: 26.2 CM
BH CV ECHO MEAS - LVIDD: 4.5 CM
BH CV ECHO MEAS - LVIDS: 2.9 CM
BH CV ECHO MEAS - LVOT AREA: 2.21 CM2
BH CV ECHO MEAS - LVOT DIAM: 1.68 CM
BH CV ECHO MEAS - LVPWD: 0.87 CM
BH CV ECHO MEAS - MED PEAK E' VEL: 5.4 CM/SEC
BH CV ECHO MEAS - MR MAX PG: 98.4 MMHG
BH CV ECHO MEAS - MR MAX VEL: 496.1 CM/SEC
BH CV ECHO MEAS - MV A DUR: 0.1 SEC
BH CV ECHO MEAS - MV A MAX VEL: 41.3 CM/SEC
BH CV ECHO MEAS - MV DEC SLOPE: 486.3 CM/SEC2
BH CV ECHO MEAS - MV DEC TIME: 0.2 SEC
BH CV ECHO MEAS - MV E MAX VEL: 98.4 CM/SEC
BH CV ECHO MEAS - MV E/A: 2.38
BH CV ECHO MEAS - MV MAX PG: 5.4 MMHG
BH CV ECHO MEAS - MV MEAN PG: 1.31 MMHG
BH CV ECHO MEAS - MV P1/2T: 70.6 MSEC
BH CV ECHO MEAS - MV V2 VTI: 36.2 CM
BH CV ECHO MEAS - MVA(P1/2T): 3.1 CM2
BH CV ECHO MEAS - MVA(VTI): 1.6 CM2
BH CV ECHO MEAS - PA ACC TIME: 0.1 SEC
BH CV ECHO MEAS - PA V2 MAX: 94.6 CM/SEC
BH CV ECHO MEAS - PULM A REVS DUR: 0.09 SEC
BH CV ECHO MEAS - PULM A REVS VEL: 21.1 CM/SEC
BH CV ECHO MEAS - PULM DIAS VEL: 47.3 CM/SEC
BH CV ECHO MEAS - PULM S/D: 0.79
BH CV ECHO MEAS - PULM SYS VEL: 37.5 CM/SEC
BH CV ECHO MEAS - RAP SYSTOLE: 3 MMHG
BH CV ECHO MEAS - RV MAX PG: 2.34 MMHG
BH CV ECHO MEAS - RV V1 MAX: 76.4 CM/SEC
BH CV ECHO MEAS - RV V1 VTI: 22.7 CM
BH CV ECHO MEAS - RVSP: 46.2 MMHG
BH CV ECHO MEAS - SV(LVOT): 57.9 ML
BH CV ECHO MEAS - SV(MOD-SP2): 45 ML
BH CV ECHO MEAS - SV(MOD-SP4): 45 ML
BH CV ECHO MEAS - SVI(LVOT): 33.4 ML/M2
BH CV ECHO MEAS - SVI(MOD-SP2): 25.9 ML/M2
BH CV ECHO MEAS - SVI(MOD-SP4): 25.9 ML/M2
BH CV ECHO MEAS - TAPSE (>1.6): 1.52 CM
BH CV ECHO MEAS - TR MAX PG: 43.2 MMHG
BH CV ECHO MEAS - TR MAX VEL: 328.5 CM/SEC
BH CV ECHO MEASUREMENTS AVERAGE E/E' RATIO: 18.74
BH CV XLRA - RV BASE: 3 CM
BH CV XLRA - RV LENGTH: 6.6 CM
BH CV XLRA - RV MID: 2.12 CM
BH CV XLRA - TDI S': 8.6 CM/SEC
BILIRUB SERPL-MCNC: 0.4 MG/DL (ref 0–1.2)
BUN SERPL-MCNC: 22 MG/DL (ref 8–23)
BUN/CREAT SERPL: 17.9 (ref 7–25)
CALCIUM SPEC-SCNC: 8.9 MG/DL (ref 8.6–10.5)
CHLORIDE SERPL-SCNC: 106 MMOL/L (ref 98–107)
CO2 SERPL-SCNC: 22.9 MMOL/L (ref 22–29)
CREAT SERPL-MCNC: 1.23 MG/DL (ref 0.57–1)
DEPRECATED RDW RBC AUTO: 44.1 FL (ref 37–54)
EGFRCR SERPLBLD CKD-EPI 2021: 42.1 ML/MIN/1.73
ERYTHROCYTE [DISTWIDTH] IN BLOOD BY AUTOMATED COUNT: 14.5 % (ref 12.3–15.4)
GLOBULIN UR ELPH-MCNC: 3.1 GM/DL
GLUCOSE SERPL-MCNC: 124 MG/DL (ref 65–99)
HCT VFR BLD AUTO: 37 % (ref 34–46.6)
HGB BLD-MCNC: 11.3 G/DL (ref 12–15.9)
LEFT ATRIUM VOLUME INDEX: 26.5 ML/M2
MCH RBC QN AUTO: 25.9 PG (ref 26.6–33)
MCHC RBC AUTO-ENTMCNC: 30.5 G/DL (ref 31.5–35.7)
MCV RBC AUTO: 84.9 FL (ref 79–97)
PLATELET # BLD AUTO: 323 10*3/MM3 (ref 140–450)
PMV BLD AUTO: 9.4 FL (ref 6–12)
POTASSIUM SERPL-SCNC: 4.2 MMOL/L (ref 3.5–5.2)
PROT SERPL-MCNC: 6.5 G/DL (ref 6–8.5)
RBC # BLD AUTO: 4.36 10*6/MM3 (ref 3.77–5.28)
SINUS: 2.46 CM
SODIUM SERPL-SCNC: 140 MMOL/L (ref 136–145)
STJ: 2.6 CM
WBC NRBC COR # BLD AUTO: 8.97 10*3/MM3 (ref 3.4–10.8)

## 2024-05-30 PROCEDURE — 80053 COMPREHEN METABOLIC PANEL: CPT

## 2024-05-30 PROCEDURE — 36415 COLL VENOUS BLD VENIPUNCTURE: CPT

## 2024-05-30 PROCEDURE — 93306 TTE W/DOPPLER COMPLETE: CPT

## 2024-05-30 PROCEDURE — 85027 COMPLETE CBC AUTOMATED: CPT

## 2024-05-30 PROCEDURE — 25510000001 PERFLUTREN (DEFINITY) 8.476 MG IN SODIUM CHLORIDE (PF) 0.9 % 10 ML INJECTION: Performed by: INTERNAL MEDICINE

## 2024-05-30 RX ADMIN — PERFLUTREN 3 ML: 6.52 INJECTION, SUSPENSION INTRAVENOUS at 15:13

## 2024-05-30 NOTE — PROGRESS NOTES
[facility] New Patient Office Note     Encounter Date:24  Patient:Briseida Antony  :1934  MRN:[id number]    Referring Provider: Aaron Salinas MD    Consulted for: Aortic stenosis    Chief Complaint:   Chief Complaint   Patient presents with    1 month post TAVR     History of Presenting Illness:      Ms. Antony is a 89 y.o. woman with past medical history notable for nonrheumatic aortic valve stenosis status post TAVR, chronic systolic and diastolic congestive heart failure, paroxysmal atrial fibrillation not on anticoagulation due to bleeding risk, mixed hyperlipidemia, and pulmonary hypertension who presents to our office for i scheduled follow-up after her recent TAVR.  Overall she has done well with the procedure.  She has recovered nicely in fact she is doing great she is actually not needing her diuretic as much due to improvement in her leg swelling      Review of Systems:  Review of Systems   Constitutional: Positive for malaise/fatigue.   HENT: Negative.     Eyes: Negative.    Cardiovascular:  Positive for dyspnea on exertion and leg swelling.   Respiratory:  Positive for shortness of breath.    Endocrine: Negative.    Hematologic/Lymphatic: Negative.    Skin: Negative.    Musculoskeletal: Negative.    Gastrointestinal: Negative.    Genitourinary: Negative.    Neurological: Negative.    Psychiatric/Behavioral: Negative.     Allergic/Immunologic: Negative.        Current Outpatient Medications on File Prior to Visit   Medication Sig Dispense Refill    acetaminophen (TYLENOL) 325 MG tablet Take 2 tablets by mouth Every 4 (Four) Hours As Needed for Mild Pain or Fever (temperature greater than 101F). 30 tablet 1    amiodarone (PACERONE) 200 MG tablet Take 1 tablet by mouth Daily. 30 tablet 5    amLODIPine (NORVASC) 2.5 MG tablet Take 1 tablet by mouth Daily. 30 tablet 1    aspirin 81 MG EC tablet Take 1 tablet by mouth Daily. 30 tablet 1    Blood Glucose Monitoring Suppl (ACCU-CHEK HU  PLUS) w/Device kit Use to check glucose once daily . DM2/E11.9 1 kit 0    empagliflozin (Jardiance) 10 MG tablet tablet Take 1 tablet by mouth Daily. 30 tablet 11    ferrous gluconate (FERGON) 324 MG tablet Take 1 tablet by mouth 5(five) times a week with breakfast. (Patient taking differently: Take 1 tablet by mouth. Take 1 tablet by mouth 5(five) times a week with breakfast.  MONDAY THROUGH FRIDAY) 20 tablet 5    glucose blood (Accu-Chek Michelle Plus) test strip Use to check glucose once daily . DM/ e11.9 100 each 11    Lancets (ACCU-CHEK MULTICLIX) lancets Use to check glucose once daily .  DM/ e11.9 100 each 12    levothyroxine (SYNTHROID, LEVOTHROID) 88 MCG tablet TAKE 1 TABLET BY MOUTH EVERY DAY IN THE MORNING 90 tablet 1    losartan (COZAAR) 25 MG tablet Take 1 tablet by mouth 2 (Two) Times a Day. 30 tablet 1    metoprolol succinate XL (TOPROL-XL) 25 MG 24 hr tablet Take 0.5 tablets by mouth Daily. (Patient taking differently: Take 0.5 tablets by mouth Daily.) 15 tablet 5    pantoprazole (PROTONIX) 40 MG EC tablet Take 1 tablet by mouth 2 (Two) Times a Day Before Meals. 60 tablet 5    rOPINIRole (REQUIP) 0.25 MG tablet Take 1 tablet by mouth Every Night. Take 1 hour before bedtime. 30 tablet 5    rosuvastatin (CRESTOR) 10 MG tablet Take 1 tablet by mouth Every Night. 30 tablet 5    vitamin B-12 (CYANOCOBALAMIN) 1000 MCG tablet Take 1 tablet by mouth Daily. 30 tablet 5    vitamin D (ERGOCALCIFEROL) 1.25 MG (40036 UT) capsule capsule Take 1 capsule by mouth 1 (One) Time Per Week. 5 capsule 3     Current Facility-Administered Medications on File Prior to Visit   Medication Dose Route Frequency Provider Last Rate Last Admin    [COMPLETED] perflutren (DEFINITY) 8.476 mg in Sodium Chloride (PF) 0.9 % 10 mL injection  3 mL Intravenous Once in imaging Orlando Cash MD   3 mL at 05/30/24 1513       Allergies   Allergen Reactions    Atorvastatin Calcium Myalgia    Digoxin Other (See Comments)     Digoxin toxicity      Neosporin [Bacitracin-Polymyxin B] Swelling       Past Medical History:   Diagnosis Date    Anemia     Aortic valve stenosis     Arthritis     Atrial fibrillation     CHF (congestive heart failure)     Chronic kidney disease     FOLLOWED BY PCP ONLY    Diabetes mellitus     GERD (gastroesophageal reflux disease)     Heart murmur     History of COVID-19 12/2023    HOSPITALIZED WITH COVID PNEUMONIA    History of syncope     Hyperlipidemia     Hypertension     Hypothyroidism     Multiple falls     Restless legs syndrome        Past Surgical History:   Procedure Laterality Date    ANKLE OPEN REDUCTION INTERNAL FIXATION Left 08/27/2023    Procedure: LEFT ANKLE OPEN REDUCTION INTERNAL FIXATION;  Surgeon: Tonny Walter II, MD;  Location: [facility] MAIN OR;  Service: Orthopedics;  Laterality: Left;    CARDIAC CATHETERIZATION N/A 3/29/2024    Procedure: Right and Left Heart Cath;  Surgeon: Orlando Cash MD;  Location: [facility] CATH INVASIVE LOCATION;  Service: Cardiovascular;  Laterality: N/A;    CARDIAC CATHETERIZATION N/A 3/29/2024    Procedure: Coronary angiography;  Surgeon: Orlando Cash MD;  Location: [facility] CATH INVASIVE LOCATION;  Service: Cardiovascular;  Laterality: N/A;    CATARACT EXTRACTION EXTRACAPSULAR W/ INTRAOCULAR LENS IMPLANTATION Bilateral     CHOLECYSTECTOMY WITH INTRAOPERATIVE CHOLANGIOGRAM N/A 08/11/2023    Procedure: CHOLECYSTECTOMY LAPAROSCOPIC INTRAOPERATIVE CHOLANGIOGRAM;  Surgeon: Lorena Olivares MD;  Location: [facility] MAIN OR;  Service: General;  Laterality: N/A;    COLONOSCOPY N/A 08/26/2023    Procedure: COLONOSCOPY TO CECUM/TI;  Surgeon: Juan M Mckoy MD;  Location: [facility] ENDOSCOPY;  Service: Gastroenterology;  Laterality: N/A;  pre: ANEMIA, GI BLEED  post: FAIR PREP, HEMORRHOIDS    ENDOSCOPY N/A 08/25/2023    Procedure: ESOPHAGOGASTRODUODENOSCOPY;  Surgeon: Orlando Pang MD;  Location: [facility] ENDOSCOPY;  Service: Gastroenterology;  Laterality: N/A;  Pre - GI  "Bleed    ERCP N/A 2023    Procedure: ENDOSCOPIC RETROGRADE CHOLANGIOPANCREATOGRAPHY with sphincterotomy and balloon sweep;  Surgeon: Orlando Huang MD;  Location: [facility] ENDOSCOPY;  Service: Gastroenterology;  Laterality: N/A;  PRE/POST - cbd stones    HARDWARE REMOVAL Left 10/02/2023    Procedure: LEFT ANKLE HARDWARE REMOVAL;  Surgeon: Tonny Walter II, MD;  Location: [facility] OR [facility] [place];  Service: Orthopedics;  Laterality: Left;    KNEE ARTHROSCOPY Right     SKIN CANCER EXCISION Left 2019    Left Malar       Social History     Socioeconomic History    Marital status:      Spouse name: Neel    Number of children: 4   Tobacco Use    Smoking status: Former     Current packs/day: 0.00     Types: Cigarettes     Start date: 1946     Quit date: 1961     Years since quittin.1     Passive exposure: Past    Smokeless tobacco: Never    Tobacco comments:     1/2 PPD X 15 YEARS    Vaping Use    Vaping status: Never Used   Substance and Sexual Activity    Alcohol use: Not Currently    Drug use: No    Sexual activity: Defer       Family History   Problem Relation Age of Onset    Hypertension Mother     Hypertension Father     Heart attack Father 55         at 55    Hyperlipidemia Father     Hyperlipidemia Sister     Hypertension Sister     Leukemia Sister     Coronary artery disease Brother         s/p cabg    Hypertension Brother     Breast cancer Daughter     Ovarian cancer Daughter     Colon cancer Neg Hx     Malig Hyperthermia Neg Hx        The following portions of the patient's history were reviewed and updated as appropriate: allergies, current medications, past family history, past medical history, past social history, past surgical history and problem list.       Objective:       Vitals:    24 1518   BP: 168/80   BP Location: Left arm   Patient Position: Sitting   Pulse: 50   Weight: 68.5 kg (151 lb)   Height: 162.6 cm (64\")       Body mass index is 25.92 " kg/m².    Physical Exam:  Constitutional: Well appearing, Frail, No acute distress   HENT: Oropharynx clear and membrane moist  Eyes: Normal conjunctiva, no sclera icterus.  Neck: Supple, no carotid bruit bilaterally.  Cardiovascular: Regular rate and rhythm, No Murmur, No bilateral lower extremity edema.  Pulmonary: Normal respiratory effort, normal lung sounds, no wheezing.  Neurological: Alert and orient x 3.   Skin: Warm, dry, no ecchymosis, no rash.  Psych: Appropriate mood and affect. Normal judgment and insight.       Lab Results   Component Value Date     05/30/2024     04/20/2024    K 4.2 05/30/2024    K 3.9 04/20/2024     05/30/2024     (H) 04/20/2024    CO2 22.9 05/30/2024    CO2 23.5 04/20/2024    BUN 22 05/30/2024    BUN 21 04/20/2024    CREATININE 1.23 (H) 05/30/2024    CREATININE 1.18 (H) 04/20/2024    EGFRIFNONA 49 (L) 12/06/2021    EGFRIFNONA 52 (L) 09/09/2020    EGFRIFAFRI 57 (L) 12/06/2021    EGFRIFAFRI 62 09/09/2020    GLUCOSE 124 (H) 05/30/2024    GLUCOSE 108 (H) 04/20/2024    CALCIUM 8.9 05/30/2024    CALCIUM 8.8 04/20/2024    PROTENTOTREF 7.7 12/06/2021    PROTENTOTREF 6.5 09/09/2020    ALBUMIN 3.4 (L) 05/30/2024    ALBUMIN 3.8 04/12/2024    BILITOT 0.4 05/30/2024    BILITOT 0.5 04/12/2024    AST 21 05/30/2024    AST 19 04/12/2024    ALT 21 05/30/2024    ALT 15 04/12/2024     Lab Results   Component Value Date    WBC 8.97 05/30/2024    WBC 9.27 04/20/2024    HGB 11.3 (L) 05/30/2024    HGB 10.0 (L) 04/20/2024    HCT 37.0 05/30/2024    HCT 32.4 (L) 04/20/2024    MCV 84.9 05/30/2024    MCV 84.8 04/20/2024     05/30/2024     04/20/2024     Lab Results   Component Value Date    CHOL 157 06/20/2023    TRIG 114 06/20/2023    TRIG 75 12/06/2021    HDL 45 06/20/2023    HDL 49 12/06/2021    LDL 91 06/20/2023    LDL 50 12/06/2021     Lab Results   Component Value Date    PROBNP 942.0 04/12/2024    PROBNP 1,486.0 02/26/2024     Lab Results   Component Value Date     TROPONINT 45 (H) 12/16/2023     Lab Results   Component Value Date    TSH 1.800 02/26/2024    TSH 3.030 02/12/2024       ECG 12 Lead    Date/Time: 5/30/2024 4:17 PM  Performed by: Orlando Cash MD    Authorized by: Orlando Cash MD  Comparison: compared with previous ECG from 4/20/2024  Similar to previous ECG  Rhythm: sinus rhythm         Echocardiogram 5/30/2024 images reviewed by myself:  Left ventricular systolic function is normal. Calculated left ventricular EF = 61.7% Left ventricular ejection fraction appears to be 61 - 65%.  The following left ventricular wall segments are hypokinetic: apex hypokinetic.  Left ventricular diastolic function is consistent with (grade II w/high LAP) pseudonormalization.  Mildly reduced right ventricular systolic function noted.  The right ventricular cavity is mildly dilated.  The right atrial cavity is mildly  dilated.  Peak velocity of the flow distal to the aortic valve is 113.3 cm/s. Aortic valve mean pressure gradient is 3 mmHg. Aortic valve dimensionless index is 0.9 .  There is a TAVR valve present.  Normal prosthetic valve function.  Leaflets appear normal.  Severe mitral valve regurgitation is present.  Estimated right ventricular systolic pressure from tricuspid regurgitation is moderately elevated (45-55 mmHg).    Echocardiogram 4/20/2024 images reviewed by myself:  Left ventricular systolic function is normal. Calculated left ventricular EF = 62.1% Normal left ventricular cavity size and wall thickness noted. All left ventricular wall segments contract normally. Left ventricular diastolic function was indeterminate.  The left atrial cavity is moderately dilated.  There is mild anterior paravalvular regurgitation Aortic valve area is 1.09 cm2. Peak velocity of the flow distal to the aortic valve is 207.3 cm/s. Aortic valve maximum pressure gradient is 17.2 mmHg. Aortic valve mean pressure gradient is 8.3 mmHg. Aortic valve dimensionless index is 0.50 .  There is a 26 mm  MDT Evolute TAVR valve present. The aortic valve peak and mean gradients are within defined limits. The prosthetic aortic valve is not well visualized.  Mild to moderate tricuspid valve regurgitation is present. Estimated right ventricular systolic pressure from tricuspid regurgitation is markedly elevated (>55 mmHg). Calculated right ventricular systolic pressure from tricuspid regurgitation is 61 mmHg.    TAVR 4/19/2024:  Successful placement of 26 mm Medtronic evolute FX aortic valve bioprosthesis with no paravalvular leak.  Bedside echocardiogram identified mean gradient 5 mmHg and calculated valve area of 1.1 cm2    Cardiac Catheterization 3/29/2024:  Moderate to severe single-vessel coronary disease involving the proximal to mid LAD otherwise mild 30% mid circumflex segment stenoses and 30% RCA mid segment stenoses.  Borderline elevated LVEDP of 16 mmHg with a peak to peak gradient across the valve at approximately 40 mmHg on pullback assessment consistent with echocardiogram findings  Moderate pulmonary hypertension with a mean PA pressure of 39 mmHg in the setting of elevated wedge pressures unclear if were not getting good wedge tracings versus V wave    Echocardiogram 2/26/2024:  Left ventricular systolic function is normal. Calculated left ventricular EF = 65.3%  Left ventricular diastolic function is consistent with (grade III w/high LAP) reversible restrictive pattern.  The left atrial cavity is mildly dilated.  The right atrial cavity is mildly  dilated.  Severe aortic valve stenosis is present. Aortic valve area is 0.7 cm2.Peak velocity of the flow distal to the aortic valve is 414.6 cm/s. Aortic valve maximum pressure gradient is 69 mmHg. Aortic valve mean pressure gradient is 40 mmHg. Aortic valve dimensionless index is 0.3 .  Calculated right ventricular systolic pressure from tricuspid regurgitation is 66 mmHg.    Echocardiogram 12/18/2023:  Left ventricular systolic function is  normal. Calculated left ventricular EF = 64.4%  Left ventricular diastolic function was normal.  The left atrial cavity is moderately dilated.  Moderate aortic valve stenosis is present. Aortic valve area is 0.6 cm2.  Peak velocity of the flow distal to the aortic valve is 341 cm/s. Aortic valve maximum pressure gradient is 47 mmHg. Aortic valve mean pressure gradient is 27 mmHg. Aortic valve dimensionless index is 0.3 .  Estimated right ventricular systolic pressure from tricuspid regurgitation is mildly elevated (35-45 mmHg). Calculated right ventricular systolic pressure from tricuspid regurgitation is 36 mmHg.    Echocardiogram 8/8/2023:  Left ventricular systolic function is normal. Calculated left ventricular EF = 63.2%  Left ventricular diastolic function was normal.  There is calcification of the aortic valve mainly affecting the non-coronary, left coronary and right coronary cusp(s). There is thickening of the aortic valve. The aortic valve appears trileaflet. No significant aortic valve regurgitation is present. Moderate aortic valve stenosis is present. Aortic valve area is 0.87 cm2. Peak velocity of the flow distal to the aortic valve is 317.1 cm/s. Aortic valve maximum pressure gradient is 40.2 mmHg. Aortic valve mean  Calculated right ventricular systolic pressure from tricuspid regurgitation is 38 mmHg.    Echocardiogram 12/16/2022:  Left ventricular systolic function is low normal. Calculated left ventricular EF = 41.7% Left ventricular ejection fraction appears to be 41 - 45%. Normal left ventricular cavity size and wall thickness noted. There is left ventricular global hypokinesis noted. Left ventricular diastolic function was indeterminate. There is markedly abnormal septal motion that appears to correlate with respiration and suggests significant underlying lung disease vs diastolic volume overload.  The right ventricular cavity is moderately dilated. Mildly reduced right ventricular systolic  function noted.  Moderate mitral valve regurgitation is present.  Moderate tricuspid valve regurgitation is present. Estimated right ventricular systolic pressure from tricuspid regurgitation is moderately elevated (45-55 mmHg). Calculated right ventricular systolic pressure from tricuspid regurgitation is 46 mmHg.        Assessment:          Diagnosis Plan   1. S/P TAVR (transcatheter aortic valve replacement)  ECG 12 Lead               Plan:       Ms. Antony is a 89 y.o. woman with past medical history notable for nonrheumatic aortic valve stenosis status post TAVR, chronic systolic and diastolic congestive heart failure, paroxysmal atrial fibrillation not on anticoagulation due to bleeding risk, mixed hyperlipidemia, and pulmonary hypertension who presents for scheduled follow-up after recent TAVR.  Overall she is doing great after her valve implant she still has some shortness of breath but getting stronger each day    Nonrheumatic aortic valve stenosis/status post TAVR:  Status post implantation of Medtronic evolute FX aortic bioprosthetic valve 4/2024  Echocardiogram today shows normal valve function  NYHA class II symptoms  Follow-up echocardiogram in 1 year    Essential hypertension:  Blood pressure is a little bit elevated today although at other times has been better controlled continue to monitor this given her advanced age would not be too aggressive with titration of blood pressure medications but she might benefit from further titration to help out with her diastolic heart failure.    Chronic systolic and diastolic congestive heart failure:  Has done better since TAVR previously using diuretics quite frequently now is only needed to a few days this month      Follow-up:  As scheduled      Thank you for allowing me to participate in the care of Briseida Antony. Feel free to contact me directly with any further questions or concerns.    Orlando Cash MD  [facility]  05/30/24  16:23  EDT

## 2024-07-01 ENCOUNTER — PATIENT MESSAGE (OUTPATIENT)
Dept: CARDIOLOGY | Facility: CLINIC | Age: 89
End: 2024-07-01
Payer: MEDICARE

## 2024-07-02 RX ORDER — AMLODIPINE BESYLATE 2.5 MG/1
5 TABLET ORAL DAILY
Start: 2024-07-02

## 2024-07-02 NOTE — TELEPHONE ENCOUNTER
Notified patient's daughter, Nicole, of recommendations, allowed per MICHAEL. She verbalized understanding.    Jayleen Arteaga RN  Triage MG

## 2024-07-02 NOTE — TELEPHONE ENCOUNTER
Can we have patient increase her amlodipine to 5 mg?  She may have some lower extremity swelling with higher dose of amlodipine but I think we should try this given her chronic kidney disease.  If she has swelling but this dose may be she can start wearing some compression stockings which can help.  While her body adjusts to the new dose.  Thank you

## 2024-07-12 DIAGNOSIS — E55.9 VITAMIN D DEFICIENCY: Primary | ICD-10-CM

## 2024-07-15 DIAGNOSIS — E55.9 VITAMIN D DEFICIENCY: ICD-10-CM

## 2024-07-15 LAB — 25(OH)D3+25(OH)D2 SERPL-MCNC: 59.1 NG/ML (ref 30–100)

## 2024-07-24 ENCOUNTER — PATIENT MESSAGE (OUTPATIENT)
Dept: INTERNAL MEDICINE | Age: 89
End: 2024-07-24
Payer: MEDICARE

## 2024-07-24 DIAGNOSIS — E11.9 TYPE 2 DIABETES MELLITUS WITHOUT COMPLICATION, WITHOUT LONG-TERM CURRENT USE OF INSULIN: ICD-10-CM

## 2024-07-24 DIAGNOSIS — E55.9 VITAMIN D DEFICIENCY: Primary | ICD-10-CM

## 2024-07-30 RX ORDER — BLOOD SUGAR DIAGNOSTIC
STRIP MISCELLANEOUS
Qty: 100 EACH | Refills: 1 | Status: SHIPPED | OUTPATIENT
Start: 2024-07-30

## 2024-07-30 RX ORDER — ERGOCALCIFEROL 1.25 MG/1
50000 CAPSULE ORAL
Qty: 6 CAPSULE | Refills: 1 | Status: SHIPPED | OUTPATIENT
Start: 2024-07-30

## 2024-07-30 RX ORDER — LANCETS
EACH MISCELLANEOUS
Qty: 100 EACH | Refills: 1 | Status: SHIPPED | OUTPATIENT
Start: 2024-07-30

## 2024-07-30 NOTE — TELEPHONE ENCOUNTER
From: Briseida Antony  To: Pérez Wheat  Sent: 7/24/2024 9:49 AM EDT  Subject: Vitamin D    Per your message to discontinue the prescription vitamin D and switch to a daily over the counter Vitamin D. Is it not ok to continue the once a week prescription instead of adding another daily pill to her regime? She takes about 10 pills in the morning and 4 in the evening.   Thank you,  Nicole Antony, her daughter.  [phone]

## 2024-08-02 RX ORDER — AMLODIPINE BESYLATE 5 MG/1
5 TABLET ORAL DAILY
Qty: 90 TABLET | Refills: 1 | Status: SHIPPED | OUTPATIENT
Start: 2024-08-02

## 2024-08-06 ENCOUNTER — OFFICE VISIT (OUTPATIENT)
Age: 89
End: 2024-08-06
Payer: MEDICARE

## 2024-08-06 VITALS
SYSTOLIC BLOOD PRESSURE: 130 MMHG | HEIGHT: 64 IN | HEART RATE: 48 BPM | WEIGHT: 153 LBS | BODY MASS INDEX: 26.12 KG/M2 | DIASTOLIC BLOOD PRESSURE: 78 MMHG

## 2024-08-06 DIAGNOSIS — I48.19 ATRIAL FIBRILLATION, PERSISTENT: Primary | ICD-10-CM

## 2024-08-06 DIAGNOSIS — Z95.2 HISTORY OF TRANSCATHETER AORTIC VALVE REPLACEMENT (TAVR): ICD-10-CM

## 2024-08-06 DIAGNOSIS — I10 PRIMARY HYPERTENSION: Chronic | ICD-10-CM

## 2024-08-06 DIAGNOSIS — N18.31 STAGE 3A CHRONIC KIDNEY DISEASE (CKD): Chronic | ICD-10-CM

## 2024-08-06 DIAGNOSIS — D50.8 OTHER IRON DEFICIENCY ANEMIA: ICD-10-CM

## 2024-08-06 PROCEDURE — 93000 ELECTROCARDIOGRAM COMPLETE: CPT | Performed by: INTERNAL MEDICINE

## 2024-08-06 PROCEDURE — 99214 OFFICE O/P EST MOD 30 MIN: CPT | Performed by: INTERNAL MEDICINE

## 2024-08-06 NOTE — PROGRESS NOTES
Date of Office Visit: 2024  Encounter Provider: Winston Cunningham MD  Place of Service: [facility] CARDIOLOGY  Patient Name: Briseida Antony  :1934    Chief Complaint   Patient presents with    Atrial Fibrillation    Follow-up     6 month   :     HPI: Briseida Antony is a 89 y.o. female who presents today for persistent atrial fibrillation.     She has a history of symptomatic persistent atrial fibrillation that we are controlling with amiodarone.     She has done well, with this treatment.  She describes the atrial fibrillation as a distant memory.     Her functional status she feels is better than a few months ago, and she has been getting out of her home more.     She is not anticoagulated.     Other relevant medical history, is of TAVR          Past Medical History:   Diagnosis Date    Anemia     Aortic valve stenosis     Arthritis     Atrial fibrillation     CHF (congestive heart failure)     Chronic kidney disease     FOLLOWED BY PCP ONLY    Diabetes mellitus     GERD (gastroesophageal reflux disease)     Heart murmur     History of COVID-19 2023    HOSPITALIZED WITH COVID PNEUMONIA    History of syncope     Hyperlipidemia     Hypertension     Hypothyroidism     Multiple falls     Restless legs syndrome        Past Surgical History:   Procedure Laterality Date    ANKLE OPEN REDUCTION INTERNAL FIXATION Left 2023    Procedure: LEFT ANKLE OPEN REDUCTION INTERNAL FIXATION;  Surgeon: Tonny Walter II, MD;  Location: [facility];  Service: Orthopedics;  Laterality: Left;    AORTIC VALVE REPAIR/REPLACEMENT N/A 2024    Procedure: TRANSFEMORAL TRANSCATHETER AORTIC VALVE REPLACEMENT with intraoperative transthoracic echocardiogram and possible open surgical rescue;  Surgeon: Sukhjinder Brody MD;  Location: [facility];  Service: Cardiothoracic;  Laterality: N/A;    AORTIC VALVE REPAIR/REPLACEMENT N/A 2024    Procedure: Transfemoral Transcatheter  Aortic Valve Replacement with intraoperative transthoracic echocardiogram and possible open surgical rescue;  Surgeon: Orlando Cash MD;  Location: [facility] CVOR;  Service: Cardiovascular;  Laterality: N/A;    CARDIAC CATHETERIZATION N/A 3/29/2024    Procedure: Right and Left Heart Cath;  Surgeon: Orlando Cash MD;  Location: [facility] CATH INVASIVE LOCATION;  Service: Cardiovascular;  Laterality: N/A;    CARDIAC CATHETERIZATION N/A 3/29/2024    Procedure: Coronary angiography;  Surgeon: Orlando Cash MD;  Location: [facility] CATH INVASIVE LOCATION;  Service: Cardiovascular;  Laterality: N/A;    CATARACT EXTRACTION EXTRACAPSULAR W/ INTRAOCULAR LENS IMPLANTATION Bilateral     CHOLECYSTECTOMY WITH INTRAOPERATIVE CHOLANGIOGRAM N/A 08/11/2023    Procedure: CHOLECYSTECTOMY LAPAROSCOPIC INTRAOPERATIVE CHOLANGIOGRAM;  Surgeon: Lorena Olivares MD;  Location: [facility] MAIN OR;  Service: General;  Laterality: N/A;    COLONOSCOPY N/A 08/26/2023    Procedure: COLONOSCOPY TO CECUM/TI;  Surgeon: Juan M Mckoy MD;  Location: [facility] ENDOSCOPY;  Service: Gastroenterology;  Laterality: N/A;  pre: ANEMIA, GI BLEED  post: FAIR PREP, HEMORRHOIDS    ENDOSCOPY N/A 08/25/2023    Procedure: ESOPHAGOGASTRODUODENOSCOPY;  Surgeon: Orlando Pang MD;  Location: [facility] ENDOSCOPY;  Service: Gastroenterology;  Laterality: N/A;  Pre - GI Bleed    ERCP N/A 08/12/2023    Procedure: ENDOSCOPIC RETROGRADE CHOLANGIOPANCREATOGRAPHY with sphincterotomy and balloon sweep;  Surgeon: Orlando Huang MD;  Location: [facility] ENDOSCOPY;  Service: Gastroenterology;  Laterality: N/A;  PRE/POST - cbd stones    HARDWARE REMOVAL Left 10/02/2023    Procedure: LEFT ANKLE HARDWARE REMOVAL;  Surgeon: Tonyn Walter II, MD;  Location: [facility] OR OSC;  Service: Orthopedics;  Laterality: Left;    KNEE ARTHROSCOPY Right     SKIN CANCER EXCISION Left 02/07/2019    Left Malar       Social History     Socioeconomic History    Marital status:       Spouse name: Neel    Number of children: 4   Tobacco Use    Smoking status: Former     Current packs/day: 0.00     Types: Cigarettes     Start date: 1952     Quit date: 1961     Years since quittin.2     Passive exposure: Past    Smokeless tobacco: Never    Tobacco comments:     /2 PPD X 15 YEARS    Vaping Use    Vaping status: Never Used   Substance and Sexual Activity    Alcohol use: Not Currently    Drug use: No    Sexual activity: Defer       Family History   Problem Relation Age of Onset    Hypertension Mother     Hypertension Father     Heart attack Father 55         at 55    Hyperlipidemia Father     Hyperlipidemia Sister     Hypertension Sister     Leukemia Sister     Coronary artery disease Brother         s/p cabg    Hypertension Brother     Breast cancer Daughter     Ovarian cancer Daughter     Colon cancer Neg Hx     Malig Hyperthermia Neg Hx        Review of Systems   Constitutional: Negative.   Cardiovascular: Negative.    Respiratory: Negative.     Gastrointestinal: Negative.        Allergies   Allergen Reactions    Atorvastatin Calcium Myalgia    Digoxin Other (See Comments)     Digoxin toxicity     Neosporin [Bacitracin-Polymyxin B] Swelling         Current Outpatient Medications:     Accu-Chek FastClix Lancets misc, Use to check glucose once daily .  DM/ e11.9, Disp: 100 each, Rfl: 1    acetaminophen (TYLENOL) 325 MG tablet, Take 2 tablets by mouth Every 4 (Four) Hours As Needed for Mild Pain or Fever (temperature greater than 101F)., Disp: 30 tablet, Rfl: 1    amiodarone (PACERONE) 200 MG tablet, Take 1 tablet by mouth Daily., Disp: 30 tablet, Rfl: 5    amLODIPine (NORVASC) 5 MG tablet, Take 1 tablet by mouth Daily., Disp: 90 tablet, Rfl: 1    aspirin 81 MG EC tablet, Take 1 tablet by mouth Daily., Disp: 30 tablet, Rfl: 1    Blood Glucose Monitoring Suppl (ACCU-CHEK HU PLUS) w/Device kit, Use to check glucose once daily . DM2/E11.9, Disp: 1 kit, Rfl: 0    empagliflozin  "(Jardiance) 10 MG tablet tablet, Take 1 tablet by mouth Daily., Disp: 30 tablet, Rfl: 11    ferrous gluconate (FERGON) 324 MG tablet, Take 1 tablet by mouth 5(five) times a week with breakfast. (Patient taking differently: Take 1 tablet by mouth. Take 1 tablet by mouth 5(five) times a week with breakfast. MONDAY THROUGH FRIDAY), Disp: 20 tablet, Rfl: 5    glucose blood (Accu-Chek Michelle Plus) test strip, Use to check glucose once daily . DM/ e11.9, Disp: 100 each, Rfl: 1    levothyroxine (SYNTHROID, LEVOTHROID) 88 MCG tablet, TAKE 1 TABLET BY MOUTH EVERY DAY IN THE MORNING, Disp: 90 tablet, Rfl: 1    losartan (COZAAR) 25 MG tablet, Take 1 tablet by mouth 2 (Two) Times a Day., Disp: 30 tablet, Rfl: 1    metoprolol succinate XL (TOPROL-XL) 25 MG 24 hr tablet, Take 0.5 tablets by mouth Daily. (Patient taking differently: Take 0.5 tablets by mouth Daily.), Disp: 15 tablet, Rfl: 5    pantoprazole (PROTONIX) 40 MG EC tablet, Take 1 tablet by mouth 2 (Two) Times a Day Before Meals., Disp: 60 tablet, Rfl: 5    rOPINIRole (REQUIP) 0.25 MG tablet, Take 1 tablet by mouth Every Night. Take 1 hour before bedtime., Disp: 30 tablet, Rfl: 5    rosuvastatin (CRESTOR) 10 MG tablet, Take 1 tablet by mouth Every Night., Disp: 30 tablet, Rfl: 5    vitamin B-12 (CYANOCOBALAMIN) 1000 MCG tablet, Take 1 tablet by mouth Daily., Disp: 30 tablet, Rfl: 5    vitamin D (ERGOCALCIFEROL) 1.25 MG (53687 UT) capsule capsule, Take 1 capsule by mouth Every 14 (Fourteen) Days., Disp: 6 capsule, Rfl: 1      Objective:     Vitals:    08/06/24 1013   BP: 130/78   Pulse: (!) 48   Weight: 69.4 kg (153 lb)   Height: 162.6 cm (64\")     Body mass index is 26.26 kg/m².    PHYSICAL EXAM:    Vitals and nursing note reviewed.   Constitutional:       General: Not in acute distress.     Appearance: Healthy appearance. Not in distress.   Pulmonary:      Effort: Pulmonary effort is normal. No respiratory distress.   Cardiovascular:      Normal rate. Regular rhythm. "   Edema:     Peripheral edema absent.   Skin:     General: Skin is warm and dry.   Neurological:      Mental Status: Alert and oriented to person, place, and time.   Psychiatric:         Behavior: Behavior normal.         Thought Content: Thought content normal.         Judgment: Judgment normal.             ECG 12 Lead    Date/Time: 8/6/2024 11:01 AM  Performed by: Winston Cunningham MD    Authorized by: Winston Cunningham MD  Comparison: compared with previous ECG from 4/29/2024  Similar to previous ECG  Rhythm: sinus rhythm            Assessment:       Diagnosis Plan   1. Atrial fibrillation, persistent        2. Primary hypertension        3. Stage 3a chronic kidney disease (CKD)        4. History of transcatheter aortic valve replacement (TAVR)               Plan:       The amiodarone is controlling her atrial fibrillation well.     Given her age and comorbidities I would continue the amiodarone indefinitely     Follow-up in 6 months    As always, it has been a pleasure to participate in your patient's care.      Sincerely,         Winston Cunningham MD

## 2024-08-08 RX ORDER — FERROUS GLUCONATE 324(38)MG
TABLET ORAL
Qty: 20 TABLET | Refills: 2 | Status: SHIPPED | OUTPATIENT
Start: 2024-08-08

## 2024-08-19 RX ORDER — PANTOPRAZOLE SODIUM 40 MG/1
40 TABLET, DELAYED RELEASE ORAL
Qty: 180 TABLET | Refills: 1 | Status: SHIPPED | OUTPATIENT
Start: 2024-08-19

## 2024-08-19 RX ORDER — AMLODIPINE BESYLATE 2.5 MG/1
2.5 TABLET ORAL DAILY
Qty: 90 TABLET | Refills: 1 | Status: SHIPPED | OUTPATIENT
Start: 2024-08-19

## 2024-08-19 RX ORDER — LOSARTAN POTASSIUM 25 MG/1
25 TABLET ORAL 2 TIMES DAILY
Qty: 180 TABLET | Refills: 1 | Status: SHIPPED | OUTPATIENT
Start: 2024-08-19

## 2024-08-19 RX ORDER — ROPINIROLE 0.25 MG/1
TABLET, FILM COATED ORAL
Qty: 90 TABLET | Refills: 1 | Status: SHIPPED | OUTPATIENT
Start: 2024-08-19

## 2024-08-19 RX ORDER — METOPROLOL SUCCINATE 25 MG/1
12.5 TABLET, EXTENDED RELEASE ORAL DAILY
Qty: 45 TABLET | Refills: 1 | Status: SHIPPED | OUTPATIENT
Start: 2024-08-19

## 2024-09-03 DIAGNOSIS — E03.2 HYPOTHYROIDISM DUE TO AMIODARONE: ICD-10-CM

## 2024-09-03 DIAGNOSIS — T46.2X1A HYPOTHYROIDISM DUE TO AMIODARONE: ICD-10-CM

## 2024-09-03 LAB
T4 FREE SERPL-MCNC: 1.81 NG/DL (ref 0.92–1.68)
TSH SERPL DL<=0.005 MIU/L-ACNC: 2.89 UIU/ML (ref 0.27–4.2)

## 2024-09-04 ENCOUNTER — OFFICE VISIT (OUTPATIENT)
Dept: INTERNAL MEDICINE | Age: 89
End: 2024-09-04
Payer: MEDICARE

## 2024-09-04 VITALS
HEIGHT: 64 IN | TEMPERATURE: 97.1 F | DIASTOLIC BLOOD PRESSURE: 80 MMHG | HEART RATE: 51 BPM | BODY MASS INDEX: 27.14 KG/M2 | SYSTOLIC BLOOD PRESSURE: 148 MMHG | OXYGEN SATURATION: 99 % | WEIGHT: 159 LBS

## 2024-09-04 DIAGNOSIS — N18.31 STAGE 3A CHRONIC KIDNEY DISEASE (CKD): Chronic | ICD-10-CM

## 2024-09-04 DIAGNOSIS — N18.31 TYPE 2 DIABETES MELLITUS WITH STAGE 3A CHRONIC KIDNEY DISEASE, WITHOUT LONG-TERM CURRENT USE OF INSULIN: Chronic | ICD-10-CM

## 2024-09-04 DIAGNOSIS — I10 PRIMARY HYPERTENSION: Chronic | ICD-10-CM

## 2024-09-04 DIAGNOSIS — E03.2 HYPOTHYROIDISM DUE TO AMIODARONE: ICD-10-CM

## 2024-09-04 DIAGNOSIS — I50.33 ACUTE ON CHRONIC DIASTOLIC CHF (CONGESTIVE HEART FAILURE): ICD-10-CM

## 2024-09-04 DIAGNOSIS — T46.2X1A HYPOTHYROIDISM DUE TO AMIODARONE: ICD-10-CM

## 2024-09-04 DIAGNOSIS — R54 FRAILTY: ICD-10-CM

## 2024-09-04 DIAGNOSIS — E11.22 TYPE 2 DIABETES MELLITUS WITH STAGE 3A CHRONIC KIDNEY DISEASE, WITHOUT LONG-TERM CURRENT USE OF INSULIN: Chronic | ICD-10-CM

## 2024-09-04 DIAGNOSIS — Z00.00 MEDICARE ANNUAL WELLNESS VISIT, SUBSEQUENT: Primary | ICD-10-CM

## 2024-09-04 DIAGNOSIS — E78.2 MIXED HYPERLIPIDEMIA: Chronic | ICD-10-CM

## 2024-09-04 DIAGNOSIS — G25.81 RLS (RESTLESS LEGS SYNDROME): ICD-10-CM

## 2024-09-04 DIAGNOSIS — K21.9 GASTROESOPHAGEAL REFLUX DISEASE, UNSPECIFIED WHETHER ESOPHAGITIS PRESENT: ICD-10-CM

## 2024-09-04 RX ORDER — ROPINIROLE 0.25 MG/1
0.25 TABLET, FILM COATED ORAL
COMMUNITY
Start: 2024-09-04

## 2024-09-04 RX ORDER — METOPROLOL SUCCINATE 25 MG/1
12.5 TABLET, EXTENDED RELEASE ORAL DAILY
COMMUNITY
Start: 2024-09-04

## 2024-09-04 RX ORDER — LEVOTHYROXINE SODIUM 88 UG/1
88 TABLET ORAL EVERY MORNING
COMMUNITY
Start: 2024-09-04

## 2024-09-04 RX ORDER — PANTOPRAZOLE SODIUM 40 MG/1
40 TABLET, DELAYED RELEASE ORAL
COMMUNITY
Start: 2024-09-04

## 2024-09-04 RX ORDER — ROSUVASTATIN CALCIUM 10 MG/1
10 TABLET, COATED ORAL NIGHTLY
COMMUNITY
Start: 2024-09-04

## 2024-09-04 RX ORDER — LOSARTAN POTASSIUM 25 MG/1
25 TABLET ORAL 2 TIMES DAILY
Qty: 180 TABLET | Refills: 1 | Status: SHIPPED | OUTPATIENT
Start: 2024-09-04

## 2024-09-04 NOTE — ASSESSMENT & PLAN NOTE
Continue close follow-up with cardiology.  Mild increased bilateral lower extremity edema with increase in the dose of amlodipine 5 mg.  I advised her to discuss with cardiologist if the problem worsens.  Recommended elevating legs and wearing compression stockings when sitting or resting for extended period time.  Maintain low-sodium diet of less than 2500 mg/day.

## 2024-09-04 NOTE — ASSESSMENT & PLAN NOTE
Check lab today.    Lab Results   Component Value Date    CREATININE 1.23 (H) 05/30/2024    CREATININE 1.18 (H) 04/20/2024    CREATININE 0.79 04/12/2024    CREATININE 1.30 04/04/2024    CREATININE 1.18 (H) 02/26/2024    BUN 22 05/30/2024    EGFRIFNONA 49 (L) 12/06/2021    EGFRIFAFRI 57 (L) 12/06/2021

## 2024-09-04 NOTE — ASSESSMENT & PLAN NOTE
Lab Results   Component Value Date    HGBA1C 5.80 (H) 04/12/2024    HGBA1C 5.30 12/16/2023    HGBA1C 6.30 (H) 06/20/2023    CREATININE 1.23 (H) 05/30/2024    LDL 91 06/20/2023    MALBCRERATIO 107.1 06/20/2023      Check lab today. Continue Jardiance.

## 2024-09-04 NOTE — PROGRESS NOTES
I N T E R N A L  M E D I C I N E    J U N O H  K I M,  M D      ENCOUNTER DATE:  09/04/2024    Briseida Antony / 89 y.o. / female      MEDICARE ANNUAL WELLNESS VISIT       Chief Complaint:    Chief Complaint   Patient presents with    Medicare Wellness-subsequent     12/6/21    chronic medical problems         Patient's general assessment of her health since a year ago:     - Compared to one year ago, she feels her physical health is:   STABLE/SAME    - Compared to one year ago, she feels her mental health is:  STABLE/SAME    Recent Hospitalization (within past 365 days) (NO unless indicated)  Yes at [facility] in April for TAVR      Patient Care Team:    Patient Care Team:  Pérez Wheat MD as PCP - General (Internal Medicine)  DANIS Lara MD as Consulting Physician (Ophthalmology)  Marilyn Jolly MD as Consulting Physician (Dermatology)  Emily Randolph MD as Surgeon (Orthopedic Surgery)  Aaron Salnias MD as Cardiologist (Cardiology)  Kaleigh Fagan APRN as Nurse Practitioner (Nurse Practitioner)  Winston Cunningham MD as Consulting Physician (Cardiology)  Orlando Cash MD as Consulting Physician (Cardiology)  Juan M Schmid DPM as Consulting Physician (Podiatry)    Allergies:  Atorvastatin calcium, Digoxin, and Neosporin [bacitracin-polymyxin b]    Medications:  Current Outpatient Medications on File Prior to Visit   Medication Sig Dispense Refill    Accu-Chek FastClix Lancets misc Use to check glucose once daily .  DM/ e11.9 100 each 1    acetaminophen (TYLENOL) 325 MG tablet Take 2 tablets by mouth Every 4 (Four) Hours As Needed for Mild Pain or Fever (temperature greater than 101F). 30 tablet 1    amiodarone (PACERONE) 200 MG tablet Take 1 tablet by mouth Daily. 30 tablet 5    amLODIPine (NORVASC) 5 MG tablet Take 1 tablet by mouth Daily. 90 tablet 1    aspirin 81 MG EC tablet Take 1 tablet by mouth Daily. 30 tablet 1    Blood Glucose Monitoring Suppl (ACCU-CHEK  HU PLUS) w/Device kit Use to check glucose once daily . DM2/E11.9 1 kit 0    empagliflozin (Jardiance) 10 MG tablet tablet Take 1 tablet by mouth Daily. 30 tablet 11    ferrous gluconate (FERGON) 324 MG tablet Take 1 tablet by mouth 5(five) times a week with breakfast. MONDAY THROUGH FRIDAY 20 tablet 2    glucose blood (Accu-Chek Hu Plus) test strip Use to check glucose once daily . DM/ e11.9 100 each 1    levothyroxine (SYNTHROID, LEVOTHROID) 88 MCG tablet Take 1 tablet by mouth Every Morning.      metoprolol succinate XL (TOPROL-XL) 25 MG 24 hr tablet Take 0.5 tablets by mouth Daily.      pantoprazole (PROTONIX) 40 MG EC tablet Take 1 tablet by mouth 2 (Two) Times a Day Before Meals.      rOPINIRole (REQUIP) 0.25 MG tablet Take 1 tablet by mouth every night at bedtime.      rosuvastatin (CRESTOR) 10 MG tablet Take 1 tablet by mouth Every Night.      vitamin B-12 (CYANOCOBALAMIN) 1000 MCG tablet Take 1 tablet by mouth Daily. 30 tablet 5    vitamin D (ERGOCALCIFEROL) 1.25 MG (95738 UT) capsule capsule Take 1 capsule by mouth Every 14 (Fourteen) Days. 6 capsule 1    [DISCONTINUED] levothyroxine (SYNTHROID, LEVOTHROID) 88 MCG tablet TAKE 1 TABLET BY MOUTH EVERY DAY IN THE MORNING 90 tablet 1    [DISCONTINUED] losartan (COZAAR) 25 MG tablet TAKE 1 TABLET BY MOUTH TWICE A  tablet 1    [DISCONTINUED] metoprolol succinate XL (TOPROL-XL) 25 MG 24 hr tablet TAKE 1/2 TABLET BY MOUTH DAILY 45 tablet 1    [DISCONTINUED] pantoprazole (PROTONIX) 40 MG EC tablet TAKE 1 TABLET BY MOUTH 2 TIMES A DAY BEFORE MEALS. 180 tablet 1    [DISCONTINUED] rOPINIRole (REQUIP) 0.25 MG tablet TAKE 1 TABLET BY MOUTH DAILY AT NIGHT 1 HOUR BEFORE BEDTIME 90 tablet 1    [DISCONTINUED] rosuvastatin (CRESTOR) 10 MG tablet Take 1 tablet by mouth Every Night. 30 tablet 5    [DISCONTINUED] amLODIPine (NORVASC) 2.5 MG tablet TAKE 1 TABLET BY MOUTH EVERY DAY (Patient not taking: Reported on 9/4/2024) 90 tablet 1     No current  facility-administered medications on file prior to visit.          No opioid medication identified on active medication list. I have reviewed chart for other potential  high risk medication/s and harmful drug interactions in the elderly.       Opioid Pain Assessment: No opioid listed on medication list       HPI for other active medical problems:     Patient is status post TAVR April 2024 without significant complications.  She is on amiodarone 200 mg daily for atrial fibrillation.  She is on metoprolol succinate 25 mg 1/2 tablet daily.  Hypertension remains stable on losartan 25 mg twice daily.  Patient states that her cardiologist increased amlodipine to 5 mg daily and she has noticed some increased edema of the lower extremities.  She denies chest pain or increased dyspnea on exertion/PND/orthopnea.  She remains on pantoprazole 40 mg twice daily for GERD.  She is on levothyroxine 88 mcg for hypothyroidism.  She is on ropinirole 0.25 mg nightly for restless leg syndrome.  She is also on ferrous gluconate 324 mg 5 days a week.  She takes rosuvastatin 10 mg nightly for hyperlipidemia.  She is on Jardiance 10 mg daily for heart failure but she also has type 2 diabetes.      HISTORY     PFSH:     The following portions of the patient's history were reviewed and updated as appropriate: Allergies / Current Medications / Past Medical History / Surgical History / Social History / Family History    Problem List:  Patient Active Problem List   Diagnosis    Hyperlipidemia    Hypertension    Osteoarthritis of knee    Type 2 diabetes mellitus, without long-term current use of insulin    Abnormal thyroid function test    Stage 3a chronic kidney disease (CKD)    Heart failure with preserved ejection fraction, borderline, class II    Stage 3a chronic kidney disease    Hypothyroidism due to amiodarone    Acute cholecystitis    Calculus of bile duct with acute cholecystitis and obstruction    Acute GI bleeding    Acute  posthemorrhagic anemia    Syncope    Ankle abrasion with infection    Atrial fibrillation, persistent    Acute respiratory failure with hypoxia    COVID-19 virus detected    Acute on chronic diastolic CHF (congestive heart failure)    PAF (paroxysmal atrial fibrillation)    Sepsis    Cytokine release syndrome, grade 1    Pneumonia of both lungs due to infectious organism    Anemia    Nonrheumatic aortic valve stenosis    Aortic stenosis    Frailty    History of transcatheter aortic valve replacement (TAVR)       Past Medical History:  Past Medical History:   Diagnosis Date    Allergic     Anemia     Aortic valve stenosis     Arthritis     Asthma     Atrial fibrillation     Cataract     CHF (congestive heart failure)     Cholelithiasis     Chronic kidney disease     FOLLOWED BY PCP ONLY    Diabetes mellitus     GERD (gastroesophageal reflux disease)     Heart murmur     History of COVID-19 12/2023    HOSPITALIZED WITH COVID PNEUMONIA    History of syncope     Hyperlipidemia     Hypertension     Hypothyroidism     Multiple falls     Pneumonia     Restless legs syndrome        Past Surgical History:  Past Surgical History:   Procedure Laterality Date    ANKLE OPEN REDUCTION INTERNAL FIXATION Left 08/27/2023    Procedure: LEFT ANKLE OPEN REDUCTION INTERNAL FIXATION;  Surgeon: Tonny Walter II, MD;  Location: [facility];  Service: Orthopedics;  Laterality: Left;    AORTIC VALVE REPAIR/REPLACEMENT N/A 04/19/2024    Procedure: TRANSFEMORAL TRANSCATHETER AORTIC VALVE REPLACEMENT with intraoperative transthoracic echocardiogram and possible open surgical rescue;  Surgeon: Sukhjinder Brody MD;  Location: [facility];  Service: Cardiothoracic;  Laterality: N/A;    AORTIC VALVE REPAIR/REPLACEMENT N/A 04/19/2024    Procedure: Transfemoral Transcatheter Aortic Valve Replacement with intraoperative transthoracic echocardiogram and possible open surgical rescue;  Surgeon: Orlando Cash MD;  Location:   [facility];  Service: Cardiovascular;  Laterality: N/A;    CARDIAC CATHETERIZATION N/A 03/29/2024    Procedure: Right and Left Heart Cath;  Surgeon: Orlando Cash MD;  Location: [facility] CATH INVASIVE LOCATION;  Service: Cardiovascular;  Laterality: N/A;    CARDIAC CATHETERIZATION N/A 03/29/2024    Procedure: Coronary angiography;  Surgeon: Orlando Cash MD;  Location: [facility] CATH INVASIVE LOCATION;  Service: Cardiovascular;  Laterality: N/A;    CATARACT EXTRACTION EXTRACAPSULAR W/ INTRAOCULAR LENS IMPLANTATION Bilateral     CHOLECYSTECTOMY      CHOLECYSTECTOMY WITH INTRAOPERATIVE CHOLANGIOGRAM N/A 08/11/2023    Procedure: CHOLECYSTECTOMY LAPAROSCOPIC INTRAOPERATIVE CHOLANGIOGRAM;  Surgeon: Lorena Olivares MD;  Location: [facility] MAIN OR;  Service: General;  Laterality: N/A;    COLONOSCOPY N/A 08/26/2023    Procedure: COLONOSCOPY TO CECUM/TI;  Surgeon: Juan M Mckoy MD;  Location: [facility] ENDOSCOPY;  Service: Gastroenterology;  Laterality: N/A;  pre: ANEMIA, GI BLEED  post: FAIR PREP, HEMORRHOIDS    ENDOSCOPY N/A 08/25/2023    Procedure: ESOPHAGOGASTRODUODENOSCOPY;  Surgeon: Orlando Pang MD;  Location: [facility] ENDOSCOPY;  Service: Gastroenterology;  Laterality: N/A;  Pre - GI Bleed    ERCP N/A 08/12/2023    Procedure: ENDOSCOPIC RETROGRADE CHOLANGIOPANCREATOGRAPHY with sphincterotomy and balloon sweep;  Surgeon: Orlando Huang MD;  Location: [facility] ENDOSCOPY;  Service: Gastroenterology;  Laterality: N/A;  PRE/POST - cbd stones    FRACTURE SURGERY  8/27/2023    Broken Fibula    HARDWARE REMOVAL Left 10/02/2023    Procedure: LEFT ANKLE HARDWARE REMOVAL;  Surgeon: Tonny Walter II, MD;  Location: [facility] OR OSC;  Service: Orthopedics;  Laterality: Left;    KNEE ARTHROSCOPY Right     SKIN CANCER EXCISION Left 02/07/2019    Left Malar       Social History:  Social History     Socioeconomic History    Marital status:      Spouse name: Neel    Number of children: 4   Tobacco Use     "Smoking status: Former     Current packs/day: 0.00     Types: Cigarettes     Start date: 1952     Quit date: 1961     Years since quittin.3     Passive exposure: Past    Smokeless tobacco: Never    Tobacco comments:     1/2 PPD X 15 YEARS    Vaping Use    Vaping status: Never Used   Substance and Sexual Activity    Alcohol use: Not Currently    Drug use: No    Sexual activity: Defer       Family History:  Family History   Problem Relation Age of Onset    Hypertension Mother     Hypertension Father     Heart attack Father 55         at 55    Hyperlipidemia Father     Hyperlipidemia Sister     Hypertension Sister     Leukemia Sister     Coronary artery disease Brother         s/p cabg    Hypertension Brother     Breast cancer Daughter     Ovarian cancer Daughter     Colon cancer Neg Hx     Malig Hyperthermia Neg Hx          PATIENT ASSESSMENT     Vitals:  Vitals:    24 1053   BP: 148/80   Pulse: 51   Temp: 97.1 °F (36.2 °C)   SpO2: 99%   Weight: 72.1 kg (159 lb)   Height: 162.6 cm (64\")       BP Readings from Last 3 Encounters:   24 148/80   24 130/78   24 168/70     Wt Readings from Last 3 Encounters:   24 72.1 kg (159 lb)   24 69.4 kg (153 lb)   24 68.5 kg (151 lb)      Body mass index is 27.29 kg/m².    Blood pressure readings recorded on patient flowsheet:       No data to display                    Review of Systems:    Review of Systems  Constitutional neg except per HPI   Resp neg  CV neg   GI negative    negative for change   Neuro negative for change  No significant change in mood.     Physical Exam:    Physical Exam  General: No acute distress.   Psych: Normal thought and judgment.   Cardiovascular Rate: normal. Rhythm: regular. Heart sounds: normal.    Pulm/Chest: Effort normal, breath sounds normal.   1+ bilateral lower extremities edema     Reviewed Data:    Labs:   Lab Results   Component Value Date     2024    K 4.2 2024    " "CALCIUM 8.9 05/30/2024    AST 21 05/30/2024    ALT 21 05/30/2024    BUN 22 05/30/2024    CREATININE 1.23 (H) 05/30/2024    CREATININE 1.18 (H) 04/20/2024    CREATININE 0.79 04/12/2024    EGFRIFNONA 49 (L) 12/06/2021    EGFRIFAFRI 57 (L) 12/06/2021       Lab Results   Component Value Date    HGBA1C 5.80 (H) 04/12/2024    HGBA1C 5.30 12/16/2023    HGBA1C 6.30 (H) 06/20/2023    MICROALBUR 5.9 06/20/2023       Lab Results   Component Value Date    LDL 91 06/20/2023    LDL 50 12/06/2021     (H) 06/03/2021    HDL 45 06/20/2023    TRIG 114 06/20/2023    CHOLHDLRATIO 3.49 06/20/2023       Lab Results   Component Value Date    TSH 2.890 09/03/2024    FREET4 1.81 (H) 09/03/2024          Lab Results   Component Value Date    WBC 8.97 05/30/2024    HGB 11.3 (L) 05/30/2024    HGB 10.0 (L) 04/20/2024    HGB 11.2 (L) 04/19/2024     05/30/2024                 No results found for: \"PSA\"    Imaging:     CT Angio TAVR Chest Abdomen Pelvis (04/04/2024 10:34)   1. Tricuspid aortic valve with calcification. Atherosclerotic  calcification in the central right subclavian artery with 50 to 70%  stenosis. Aortoiliac atherosclerotic calcification with less than 50%  stenosis of the infrarenal abdominal aorta.  2. Mild to moderate cardiomegaly.  3. Colonic diverticulosis.  4. Cystic masses in the pancreas may represent sidebranch intraductal  papillary mucinous neoplasms, largest measuring 1.3 cm. 2-year follow-up  CT or MR pancreas can reevaluate.  5. Pulmonary nodule in the lateral segment right middle lobe measuring 8  mm. 6-month follow-up chest CT can reevaluate.         Medical Tests:              Summary of old records / correspondence / consultant report:             Request outside records:           SCREENING ASSESSMENT      Screening for Glaucoma:  Previous screening for glaucoma?: Yes    Hearing Loss Screen:  Finger Rub Hearing Test (right ear): Passed  Finger Rub Hearing Test (left ear): Passed    Urinary " Incontinence Screen:  Episodes of urinary incontinence? : NO    Depression Screen:      9/4/2024    10:57 AM   PHQ-2/PHQ-9 Depression Screening   Little Interest or Pleasure in Doing Things 0-->not at all   Feeling Down, Depressed or Hopeless 0-->not at all   PHQ-9: Brief Depression Severity Measure Score 0        PHQ-2: 0 (Not depressed)    PHQ-9: 0 (Negative screening for depression)         FUNCTIONAL, FALL RISK, & COGNITIVE SCREENING     A) Functional and cognitive status based on patient responses:        8/30/2024     4:06 PM   Functional & Cognitive Status   Do you have difficulty preparing food and eating? No   Do you have difficulty bathing yourself, getting dressed or grooming yourself? No   Do you have difficulty using the toilet? No   Do you have difficulty moving around from place to place? No   Do you have trouble with steps or getting out of a bed or a chair? No   Current Diet Well Balanced Diet   Dental Exam Up to date   Eye Exam Up to date   Exercise (times per week) 7 times per week   Current Exercises Include Home Exercise Program (TV, Computer, Etc.);Other   Do you need help using the phone?  No   Are you deaf or do you have serious difficulty hearing?  No   Do you need help to go to places out of walking distance? No   Do you need help shopping? No   Do you need help preparing meals?  No   Do you need help with housework?  No   Do you need help with laundry? No   Do you need help taking your medications? No   Do you need help managing money? No   Do you ever drive or ride in a car without wearing a seat belt? No   Have you felt unusual stress, anger or loneliness in the last month? No   Who do you live with? Child   If you need help, do you have trouble finding someone available to you? No   Have you been bothered in the last four weeks by sexual problems? No   Do you have difficulty concentrating, remembering or making decisions? No       B) Assessment of Fall Risk:    Fall Risk Assessment was  completed, and patient is at moderate risk for falls.    Need for further evaluation of gait, strength, and balance? : YES    Timed Up and Go (TUG): 11 seconds   (>= 12 seconds indicates high risk for falling)    Observable abnormalities included:   slow cautious pace  NORMAL BALANCE  stooped forward  short strides  dragging feet   proper shoe wear     C. Assessment of Cognitive Function:    Mini-Cog Test:     1) Registration (3 objects): YES   2) Clock Draw: Passed? : Yes   3) Number of objects recalled: 3 (MA)     FURTHER EVALUATION REQUIRED?: Mild age related change noted without evidence of significant cognitive impairment    **OVERALL ASSESSMENT OF FUNCTIONAL ABILITY**  (Assessment of ability to perform ADL's (showering/bathing, using toilet, dressing, feeding self, moving self around) and IADL's (use telephone, shop, prepare food, housekeep, do laundry, transport independently, take medications independently, and handle finances)    DEGREE OF FUNCTIONAL IMPAIRMENT:   MODERATE (based on assessment noted above)    ABILITY TO LIVE INDEPENDENTLY:   Capable of living with spouse/partner/family , Current living arrangement is appropriate, and Does have adequate social support if needed       COUNSELING       A. Identification of Health Risk Factors:    Risk factors include: cardiovascular risk factors, polypharmacy, and increased fall risk      B. Age-Appropriate Screening Schedule:  (Refer to the list below for future screening recommendations based on patient's age, sex and/or medical conditions. Orders for these recommended tests are listed in the plan section. The patient has been provided with a written plan)    Health Maintenance Topics  Health Maintenance   Topic Date Due    RSV Vaccine - Adults (1 - 1-dose 60+ series) Never done    ZOSTER VACCINE (2 of 2) 04/15/2010    DXA SCAN  06/03/2024    LIPID PANEL  06/20/2024    URINE MICROALBUMIN  06/20/2024    COVID-19 Vaccine (5 - 2023-24 season) 09/01/2024     INFLUENZA VACCINE  08/01/2024    HEMOGLOBIN A1C  10/12/2024    ANNUAL WELLNESS VISIT  09/04/2025    BMI FOLLOWUP  09/04/2025    TDAP/TD VACCINES (3 - Td or Tdap) 08/24/2033    Pneumococcal Vaccine 65+  Completed       Health Maintenance Topics Due or Over-Due  Health Maintenance Due   Topic Date Due    RSV Vaccine - Adults (1 - 1-dose 60+ series) Never done    ZOSTER VACCINE (2 of 2) 04/15/2010    DXA SCAN  06/03/2024    LIPID PANEL  06/20/2024    URINE MICROALBUMIN  06/20/2024    COVID-19 Vaccine (5 - 2023-24 season) 09/01/2024    INFLUENZA VACCINE  08/01/2024    HEMOGLOBIN A1C  10/12/2024         C. Advanced Care Planning:    Advance Care Planning   ACP discussion was held with the patient during this visit. Patient has an advance directive in EMR which is still valid.        D. Patient Self-Management and Personalized Health Advice:    She has been provided with PERSONALIZED COUNSELING/INFORMATION (AVS educational information) about:     -- reducing risk for cardiovascular disease (heart, stroke, vascular), reducing risk for cardiac/vascular events with pre-existing disease, fall prevention, and polypharmacy concerns, side effects of medications      She has been recommended for the following PREVENTATIVE SERVICES which has been performed today, will be ordered today or ordered/performed on upcoming follow-up visit:     LIFESTYLE PREVENTATIVE MEASURES  NUTRITION counseling provided, EXERCISE counseling provided, EYE exam for DIABETES recommended annually , EYE exam for GLAUCOMA screening recommended annually , CARDIOVASCULAR disease risk reduction counseling performed, FALL RISK assessment / plan of care completed, URINARY incontinence assessment done    CARDIOVASCULAR SCREENING  Has established history of CV disease    CANCER SCREENING  SKIN CANCER: recommended regular self exam and use of sunscreen     MISC SCREENING  OSTEOPOROSIS screening DISCUSSED    VACCINATION/IMMUNIZATION  vaccination for INFLUENZA  administered/recommended/discussed , RSV vaccination discussed/recommended, COVID-19 vaccination discussed/recommended, vaccination for SHINGRIX administered/recommended/discussed      E. Miscellaneous Items: [id number]    Aspirin use counseling: Taking ASA appropriately as indicated    Discussed BMI with her. The BMI is in the acceptable range    Reviewed use of high risk medication in the elderly: YES    Reviewed for potential of harmful drug interactions in the elderly: YES        WRAP UP       Assessment & Plan:    1) MEDICARE ANNUAL WELLNESS VISIT    2) OTHER MEDICAL CONDITIONS ADDRESSED TODAY:            Problem List Items Addressed This Visit          High    Type 2 diabetes mellitus, without long-term current use of insulin (Chronic)    Overview     * Taken off metformin in the hospital due to worsening CKD (12/2022)    Continue Jardiance 10 mg daily (taking primarily for HF)         Current Assessment & Plan     Lab Results   Component Value Date    HGBA1C 5.80 (H) 04/12/2024    HGBA1C 5.30 12/16/2023    HGBA1C 6.30 (H) 06/20/2023    CREATININE 1.23 (H) 05/30/2024    LDL 91 06/20/2023    MALBCRERATIO 107.1 06/20/2023      Check lab today. Continue Jardiance.          Relevant Medications    Blood Glucose Monitoring Suppl (ACCU-CHEK HU PLUS) w/Device kit    empagliflozin (Jardiance) 10 MG tablet tablet    amiodarone (PACERONE) 200 MG tablet    glucose blood (Accu-Chek Hu Plus) test strip    Accu-Chek FastClix Lancets misc    Other Relevant Orders    Microalbumin / Creatinine Urine Ratio - Urine, Clean Catch    Comprehensive Metabolic Panel    Hemoglobin A1c       Medium    Hyperlipidemia (Chronic)    Current Assessment & Plan     Lab Results   Component Value Date    LDL 91 06/20/2023    LDL 50 12/06/2021     (H) 06/03/2021    TRIG 114 06/20/2023    CHOLHDLRATIO 3.49 06/20/2023       Continue rosuvastatin 10 mg daily         Relevant Medications    rosuvastatin (CRESTOR) 10 MG tablet    Other  Relevant Orders    Comprehensive Metabolic Panel    Lipid Panel With / Chol / HDL Ratio    Hypertension (Chronic)    Current Assessment & Plan     BP Readings from Last 3 Encounters:   09/04/24 148/80   08/06/24 130/78   05/30/24 168/70       Blood pressure remains stable on losartan 25 mg twice daily, metoprolol succinate 25 mg 1/2 tablet daily, and amlodipine 5 mg daily.         Relevant Medications    amLODIPine (NORVASC) 5 MG tablet    losartan (COZAAR) 25 MG tablet    metoprolol succinate XL (TOPROL-XL) 25 MG 24 hr tablet       Low    Stage 3a chronic kidney disease (CKD) (Chronic)    Current Assessment & Plan     Check lab today.    Lab Results   Component Value Date    CREATININE 1.23 (H) 05/30/2024    CREATININE 1.18 (H) 04/20/2024    CREATININE 0.79 04/12/2024    CREATININE 1.30 04/04/2024    CREATININE 1.18 (H) 02/26/2024    BUN 22 05/30/2024    EGFRIFNONA 49 (L) 12/06/2021    EGFRIFAFRI 57 (L) 12/06/2021              Relevant Medications    losartan (COZAAR) 25 MG tablet    Other Relevant Orders    Comprehensive Metabolic Panel    Frailty (Chronic)       Unprioritized    Hypothyroidism due to amiodarone    Relevant Medications    levothyroxine (SYNTHROID, LEVOTHROID) 88 MCG tablet    metoprolol succinate XL (TOPROL-XL) 25 MG 24 hr tablet    Acute on chronic diastolic CHF (congestive heart failure)    Relevant Medications    empagliflozin (Jardiance) 10 MG tablet tablet    amiodarone (PACERONE) 200 MG tablet    amLODIPine (NORVASC) 5 MG tablet    losartan (COZAAR) 25 MG tablet    metoprolol succinate XL (TOPROL-XL) 25 MG 24 hr tablet    Other Relevant Orders    Comprehensive Metabolic Panel     Other Visit Diagnoses       Medicare annual wellness visit, subsequent    -  Primary    Gastroesophageal reflux disease, unspecified whether esophagitis present        Relevant Medications    pantoprazole (PROTONIX) 40 MG EC tablet    RLS (restless legs syndrome)        Relevant Medications    rOPINIRole (REQUIP)  0.25 MG tablet                      Orders Placed This Encounter   Procedures    Microalbumin / Creatinine Urine Ratio - Urine, Clean Catch    Comprehensive Metabolic Panel    Lipid Panel With / Chol / HDL Ratio    Hemoglobin A1c       Discussion / Summary:        Medications as of TODAY:              Current Outpatient Medications   Medication Sig Dispense Refill    Accu-Chek FastClix Lancets misc Use to check glucose once daily .  DM/ e11.9 100 each 1    acetaminophen (TYLENOL) 325 MG tablet Take 2 tablets by mouth Every 4 (Four) Hours As Needed for Mild Pain or Fever (temperature greater than 101F). 30 tablet 1    amiodarone (PACERONE) 200 MG tablet Take 1 tablet by mouth Daily. 30 tablet 5    amLODIPine (NORVASC) 5 MG tablet Take 1 tablet by mouth Daily. 90 tablet 1    aspirin 81 MG EC tablet Take 1 tablet by mouth Daily. 30 tablet 1    Blood Glucose Monitoring Suppl (ACCU-CHEK HU PLUS) w/Device kit Use to check glucose once daily . DM2/E11.9 1 kit 0    empagliflozin (Jardiance) 10 MG tablet tablet Take 1 tablet by mouth Daily. 30 tablet 11    ferrous gluconate (FERGON) 324 MG tablet Take 1 tablet by mouth 5(five) times a week with breakfast. MONDAY THROUGH FRIDAY 20 tablet 2    glucose blood (Accu-Chek Hu Plus) test strip Use to check glucose once daily . DM/ e11.9 100 each 1    levothyroxine (SYNTHROID, LEVOTHROID) 88 MCG tablet Take 1 tablet by mouth Every Morning.      losartan (COZAAR) 25 MG tablet Take 1 tablet by mouth 2 (Two) Times a Day. 180 tablet 1    metoprolol succinate XL (TOPROL-XL) 25 MG 24 hr tablet Take 0.5 tablets by mouth Daily.      pantoprazole (PROTONIX) 40 MG EC tablet Take 1 tablet by mouth 2 (Two) Times a Day Before Meals.      rOPINIRole (REQUIP) 0.25 MG tablet Take 1 tablet by mouth every night at bedtime.      rosuvastatin (CRESTOR) 10 MG tablet Take 1 tablet by mouth Every Night.      vitamin B-12 (CYANOCOBALAMIN) 1000 MCG tablet Take 1 tablet by mouth Daily. 30 tablet 5     vitamin D (ERGOCALCIFEROL) 1.25 MG (32214 UT) capsule capsule Take 1 capsule by mouth Every 14 (Fourteen) Days. 6 capsule 1     No current facility-administered medications for this visit.         FOLLOW-UP:            Return in about 4 months (around 1/4/2025) for Reassess chronic medical problems, **SCHEDULE COMB AWV/MED FU FOR NEXT YR (30 MIN)**.              Future Appointments   Date Time Provider Department Center   9/5/2024  3:30 PM Aaron Salinas MD [facility]   9/9/2024  9:30 AM Kaleigh Fagan APRN [facility]  [facility]   1/8/2025 11:30 AM Pérez Wheat, MD [facility]  [facility]   2/6/2025 10:30 AM Kwaku Portillo APRN [id number] None   3/31/2025 12:00 PM [facility]   3/31/2025 12:45 PM Orlando Cash MD [facility] None   9/5/2025  1:00 PM Pérez Wheat, MD [facility]  [facility]           After Visit Summary (AVS) including the Personalized Prevention  Plan Services (PPPS) was either printed and given to the patient at check-out today and/or sent to [facility] for review.

## 2024-09-04 NOTE — ASSESSMENT & PLAN NOTE
Lab Results   Component Value Date    LDL 91 06/20/2023    LDL 50 12/06/2021     (H) 06/03/2021    TRIG 114 06/20/2023    CHOLHDLRATIO 3.49 06/20/2023       Continue rosuvastatin 10 mg daily

## 2024-09-04 NOTE — ASSESSMENT & PLAN NOTE
BP Readings from Last 3 Encounters:   09/04/24 148/80   08/06/24 130/78   05/30/24 168/70       Blood pressure remains stable on losartan 25 mg twice daily, metoprolol succinate 25 mg 1/2 tablet daily, and amlodipine 5 mg daily.

## 2024-09-05 ENCOUNTER — OFFICE VISIT (OUTPATIENT)
Dept: CARDIOLOGY | Facility: CLINIC | Age: 89
End: 2024-09-05
Payer: MEDICARE

## 2024-09-05 VITALS
OXYGEN SATURATION: 99 % | HEIGHT: 64 IN | BODY MASS INDEX: 26.8 KG/M2 | DIASTOLIC BLOOD PRESSURE: 74 MMHG | SYSTOLIC BLOOD PRESSURE: 138 MMHG | HEART RATE: 61 BPM | WEIGHT: 157 LBS

## 2024-09-05 DIAGNOSIS — N18.31 STAGE 3A CHRONIC KIDNEY DISEASE (CKD): Chronic | ICD-10-CM

## 2024-09-05 DIAGNOSIS — I50.33 ACUTE ON CHRONIC DIASTOLIC CHF (CONGESTIVE HEART FAILURE): ICD-10-CM

## 2024-09-05 DIAGNOSIS — Z95.2 HISTORY OF TRANSCATHETER AORTIC VALVE REPLACEMENT (TAVR): ICD-10-CM

## 2024-09-05 DIAGNOSIS — I48.0 PAF (PAROXYSMAL ATRIAL FIBRILLATION): Primary | ICD-10-CM

## 2024-09-05 RX ORDER — FUROSEMIDE 40 MG
40 TABLET ORAL DAILY PRN
Qty: 30 TABLET | Refills: 11 | Status: SHIPPED | OUTPATIENT
Start: 2024-09-05 | End: 2025-09-05

## 2024-09-05 NOTE — PROGRESS NOTES
Subjective:     Encounter Date:09/05/24      Patient ID: Brunilda Antony is a 89 y.o. female.    Chief Complaint:   Chief Complaint   Patient presents with    Follow-up   Follow-up CHF  Congestive Heart Failure    Atrial Fibrillation  Past medical history includes atrial fibrillation and CHF.       Ms. Antony is an 89-year-old lady past medical history hypertension, hyperlipidemia, type 2 diabetes who presents for follow-up.  Her initial presentation was due to new onset atrial fibrillation, and she has had a christa course since.    Her clinical course due to treatments of atrial fibrillation, has been difficult since the start.  She presented due to new onset systolic and diastolic heart failure due to A. fib with RVR, and was diuresed.  She was sent out on a high dose of Lasix, and then was subsequently readmitted for digoxin toxicity related to an KARL related to overdiuresis.  She subsequently underwent rhythm control strategy and has maintained sinus rhythm on amiodarone.  She follows with EP.  Thankfully, A-fib has not been a problem for her since      Unfortunately, she has had a christa course of things over the last year.  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  She follows endocrinology for this.    She had a mechanical fall with a resultant fracture in her ankle.  Sounds like it was possibly a vagal episode as she was ambulating to the bathroom.  This was after a cholecystectomy for gallstone related complications and transaminase elevation.  She was having some orthostatic blood pressure shifts and Entresto was stopped in favor of losartan.  She was taken off Xarelto due to no recurrence of atrial fibrillation, and recurrent GI bleeding and significant anemia on this.  Her anemia has subsequently stabilized.  But she remains off DOAC.    She was hospitalized earlier 2024 this year for bacterial pneumonia after COVID infection.  Jardiance was resumed after  it was held during her illness.    Her EF had recovered, which revealed that her aortic valve stenosis was progressing.  She was noted to have severe AS.  She subsequently underwent a TAVR with Medtronic evolute fx April 2024    Otherwise, no new complaints today.  She remains in sinus rhythm.  She has had no other falls.  She is doing well overall.  She has had some nonspecific fatigue over the last week or so.  She met to get blood work at her PCP but this was unable to be obtained.  They are looking to redo this soon.  Some lower extremity edema is very mild, nothing significant.           The following portions of the patient's history were reviewed and updated as appropriate: allergies, current medications, past family history, past medical history, past social history, past surgical history and problem list.    Past Medical History:   Diagnosis Date    Allergic     Anemia     Aortic valve stenosis     Arthritis     Asthma     Atrial fibrillation     Cataract     CHF (congestive heart failure)     Cholelithiasis     Chronic kidney disease     FOLLOWED BY PCP ONLY    Diabetes mellitus     GERD (gastroesophageal reflux disease)     Heart murmur     History of COVID-19 12/2023    HOSPITALIZED WITH COVID PNEUMONIA    History of syncope     Hyperlipidemia     Hypertension     Hypothyroidism     Multiple falls     Pneumonia     Restless legs syndrome        Past Surgical History:   Procedure Laterality Date    ANKLE OPEN REDUCTION INTERNAL FIXATION Left 08/27/2023    Procedure: LEFT ANKLE OPEN REDUCTION INTERNAL FIXATION;  Surgeon: Tonny Walter II, MD;  Location: [facility];  Service: Orthopedics;  Laterality: Left;    AORTIC VALVE REPAIR/REPLACEMENT N/A 04/19/2024    Procedure: TRANSFEMORAL TRANSCATHETER AORTIC VALVE REPLACEMENT with intraoperative transthoracic echocardiogram and possible open surgical rescue;  Surgeon: Sukhjinder Brody MD;  Location: [facility];  Service: Cardiothoracic;   Laterality: N/A;    AORTIC VALVE REPAIR/REPLACEMENT N/A 04/19/2024    Procedure: Transfemoral Transcatheter Aortic Valve Replacement with intraoperative transthoracic echocardiogram and possible open surgical rescue;  Surgeon: Orlando Cash MD;  Location: [facility] CVOR;  Service: Cardiovascular;  Laterality: N/A;    CARDIAC CATHETERIZATION N/A 03/29/2024    Procedure: Right and Left Heart Cath;  Surgeon: Orlando Cash MD;  Location: [facility] CATH INVASIVE LOCATION;  Service: Cardiovascular;  Laterality: N/A;    CARDIAC CATHETERIZATION N/A 03/29/2024    Procedure: Coronary angiography;  Surgeon: Orlando Cash MD;  Location: [facility] CATH INVASIVE LOCATION;  Service: Cardiovascular;  Laterality: N/A;    CATARACT EXTRACTION EXTRACAPSULAR W/ INTRAOCULAR LENS IMPLANTATION Bilateral     CHOLECYSTECTOMY      CHOLECYSTECTOMY WITH INTRAOPERATIVE CHOLANGIOGRAM N/A 08/11/2023    Procedure: CHOLECYSTECTOMY LAPAROSCOPIC INTRAOPERATIVE CHOLANGIOGRAM;  Surgeon: Lorena Olivares MD;  Location: [facility] MAIN OR;  Service: General;  Laterality: N/A;    COLONOSCOPY N/A 08/26/2023    Procedure: COLONOSCOPY TO CECUM/TI;  Surgeon: Juan M Mckoy MD;  Location: [facility] ENDOSCOPY;  Service: Gastroenterology;  Laterality: N/A;  pre: ANEMIA, GI BLEED  post: FAIR PREP, HEMORRHOIDS    ENDOSCOPY N/A 08/25/2023    Procedure: ESOPHAGOGASTRODUODENOSCOPY;  Surgeon: Orlando Pang MD;  Location: [facility] ENDOSCOPY;  Service: Gastroenterology;  Laterality: N/A;  Pre - GI Bleed    ERCP N/A 08/12/2023    Procedure: ENDOSCOPIC RETROGRADE CHOLANGIOPANCREATOGRAPHY with sphincterotomy and balloon sweep;  Surgeon: Orlando Huang MD;  Location: [facility] ENDOSCOPY;  Service: Gastroenterology;  Laterality: N/A;  PRE/POST - cbd stones    FRACTURE SURGERY  8/27/2023    Broken Fibula    HARDWARE REMOVAL Left 10/02/2023    Procedure: LEFT ANKLE HARDWARE REMOVAL;  Surgeon: Tonny Walter II, MD;  [facility];  Service:  "Orthopedics;  Laterality: Left;    KNEE ARTHROSCOPY Right     SKIN CANCER EXCISION Left 02/07/2019    Left Malar         Procedures       Objective:     Vitals:    09/05/24 1529   BP: 138/74   Pulse: 61   SpO2: 99%   Weight: 71.2 kg (157 lb)   Height: 162.6 cm (64\")               Constitutional:       Appearance: Not in distress.      Comments: Ambulates with roller walker    Neck:      Vascular: JVD normal.   Pulmonary:      Effort: Pulmonary effort is normal.      Breath sounds: Normal air entry.   Cardiovascular:      PMI at left midclavicular line. Bradycardia present. Regular rhythm. Normal S2.       Murmurs: There is a grade 1/6 high frequency blowing holosystolic murmur at the apex.   Pulses:     Intact distal pulses.   Edema:     Peripheral edema present.     Feet: 1+ edema of the left foot and trace edema of the right foot.  Skin:     General: Skin is warm and dry.   Neurological:      General: No focal deficit present.      Mental Status: Alert, oriented to person, place, and time and oriented to person, place and time.   Psychiatric:         Mood and Affect: Mood and affect normal.         Lab Review:         BUN   Date Value Ref Range Status   05/30/2024 22 8 - 23 mg/dL Final     Creatinine   Date Value Ref Range Status   05/30/2024 1.23 (H) 0.57 - 1.00 mg/dL Final   04/04/2024 1.30 0.60 - 1.30 mg/dL Final     Comment:     Serial Number: [id number]:  [id number]     Potassium   Date Value Ref Range Status   05/30/2024 4.2 3.5 - 5.2 mmol/L Final     ALT (SGPT)   Date Value Ref Range Status   05/30/2024 21 1 - 33 U/L Final     AST (SGOT)   Date Value Ref Range Status   05/30/2024 21 1 - 32 U/L Final         Performed        Assessment:          Diagnosis Plan   1. PAF (paroxysmal atrial fibrillation)        2. Acute on chronic diastolic CHF (congestive heart failure)        3. Stage 3a chronic kidney disease (CKD)        4. History of transcatheter aortic valve replacement (TAVR)                     Plan: "           Atrial fibrillation, paroxysmal, very symptomatic  Sinus bradycardia  On metoprolol succinate 12.5 XL daily.  Heart rate around 60 at rest  Amiodarone 200 daily, needed to maintain sinus rhythm   She had recurrent bleeding events and GI bleeding with unclear source.  She also had a mechanical fall with the ankle fracture.  She is maintained sinus rhythm on amiodarone, I think keeping her off therapeutic anticoagulation is appropriate.    We may need to revisit, perhaps with Eliquis, she develops A-fib again.    Amiodarone induced hypothyroidism  She is on Synthroid for this.  Would favor continuing amiodarone given how christa her course with A-fib was  Continue endocrinology follow-up, she has an appointment next week  This may be contributing to her fatigue.  She did have labs ordered by her PCP.  Acute on chronic combined systolic and diastolic heart failure, EF 40 to 45% recovered to 65% with medical therapy  Continue Jardiance, she does have some recurrent edema.  She had elevated creatinine with diuretics.  Would favor SGLT2 over diuretic therapy  She does require perhaps once a month dosing of furosemide.  She has some fatigue and lower extremity edema, although no other signs of CHF, I started and take a furosemide 40 mg tablet tomorrow and see how she does.  Severe aortic stenosis, likely uncovered after EF recovered.  She underwent TAVR with Dr. Elizabeth and Mary Grace April 19, 2024 with 26 mm Medtronic TAVR  Residual gradient mean 3 mmHg.  Much improved.  RVSP also improved.    She follows with Dr. Elizabeth with annual echoes.    She is on aspirin 81, no longer on DOAC per above.    Hypertension.  Per below  CKD 3 a Cr has ranged from 1.1 - 1.6 in the past   She had some labile blood pressures on Entresto, this was stopped in favor losartan.  Thankfully her EF recovered so I think losartan is appropriate anyway  Continue losartan 50 daily, this is split and 25 twice daily, apparently she was  having lower blood pressures while she was at rehab and this was the regimen that was settled on  On amlodipine 5.  SGLT2 inhibitor per above  Hyperlipidemia: Continue rosuvastatin 10   History of digoxin toxicity in the setting of KARL and overdiuresis.   Avoiding any digoxin use      RTC 6 months.  Doing well overall.      Current Outpatient Medications:     Accu-Chek FastClix Lancets misc, Use to check glucose once daily .  DM/ e11.9, Disp: 100 each, Rfl: 1    acetaminophen (TYLENOL) 325 MG tablet, Take 2 tablets by mouth Every 4 (Four) Hours As Needed for Mild Pain or Fever (temperature greater than 101F)., Disp: 30 tablet, Rfl: 1    amiodarone (PACERONE) 200 MG tablet, Take 1 tablet by mouth Daily., Disp: 30 tablet, Rfl: 5    amLODIPine (NORVASC) 5 MG tablet, Take 1 tablet by mouth Daily., Disp: 90 tablet, Rfl: 1    aspirin 81 MG EC tablet, Take 1 tablet by mouth Daily., Disp: 30 tablet, Rfl: 1    Blood Glucose Monitoring Suppl (ACCU-CHEK HU PLUS) w/Device kit, Use to check glucose once daily . DM2/E11.9, Disp: 1 kit, Rfl: 0    empagliflozin (Jardiance) 10 MG tablet tablet, Take 1 tablet by mouth Daily., Disp: 30 tablet, Rfl: 11    ferrous gluconate (FERGON) 324 MG tablet, Take 1 tablet by mouth 5(five) times a week with breakfast. MONDAY THROUGH FRIDAY, Disp: 20 tablet, Rfl: 2    glucose blood (Accu-Chek Hu Plus) test strip, Use to check glucose once daily . DM/ e11.9, Disp: 100 each, Rfl: 1    levothyroxine (SYNTHROID, LEVOTHROID) 88 MCG tablet, Take 1 tablet by mouth Every Morning., Disp: , Rfl:     losartan (COZAAR) 25 MG tablet, Take 1 tablet by mouth 2 (Two) Times a Day., Disp: 180 tablet, Rfl: 1    metoprolol succinate XL (TOPROL-XL) 25 MG 24 hr tablet, Take 0.5 tablets by mouth Daily., Disp: , Rfl:     pantoprazole (PROTONIX) 40 MG EC tablet, Take 1 tablet by mouth 2 (Two) Times a Day Before Meals., Disp: , Rfl:     rOPINIRole (REQUIP) 0.25 MG tablet, Take 1 tablet by mouth every night at bedtime.,  Disp: , Rfl:     rosuvastatin (CRESTOR) 10 MG tablet, Take 1 tablet by mouth Every Night., Disp: , Rfl:     vitamin B-12 (CYANOCOBALAMIN) 1000 MCG tablet, Take 1 tablet by mouth Daily., Disp: 30 tablet, Rfl: 5    vitamin D (ERGOCALCIFEROL) 1.25 MG (37580 UT) capsule capsule, Take 1 capsule by mouth Every 14 (Fourteen) Days., Disp: 6 capsule, Rfl: 1    furosemide (LASIX) 40 MG tablet, Take 1 tablet by mouth Daily As Needed (leg swelling)., Disp: 30 tablet, Rfl: 11         Return in about 7 months (around 4/5/2025).      **Dragon Disclaimer:   Much of this encounter note is an electronic transcription/translation of spoken language to printed text. The electronic translation of spoken language may permit erroneous, or at times, nonsensical words or phrases to be inadvertently transcribed. Although I have reviewed the note for such errors, some may still exist.

## 2024-09-05 NOTE — PATIENT INSTRUCTIONS
I would take a water pill tomorrow, see if that helps your fatigue at all, from your exam, I would be surprised that extra fluid is causing the symptoms but 1 dose of the furosemide probably would not hurt.    Continue to watch out for any signs or symptoms of the return of atrial fibrillation.  Thankfully the amiodarone appears to be working.  Let us know if you have any questions.

## 2024-09-09 ENCOUNTER — OFFICE VISIT (OUTPATIENT)
Dept: ENDOCRINOLOGY | Age: 89
End: 2024-09-09
Payer: MEDICARE

## 2024-09-09 ENCOUNTER — LAB (OUTPATIENT)
Facility: HOSPITAL | Age: 89
End: 2024-09-09
Payer: MEDICARE

## 2024-09-09 VITALS
HEART RATE: 53 BPM | DIASTOLIC BLOOD PRESSURE: 78 MMHG | WEIGHT: 155.8 LBS | OXYGEN SATURATION: 97 % | BODY MASS INDEX: 26.6 KG/M2 | TEMPERATURE: 96.9 F | HEIGHT: 64 IN | SYSTOLIC BLOOD PRESSURE: 124 MMHG

## 2024-09-09 DIAGNOSIS — E03.2 HYPOTHYROIDISM DUE TO AMIODARONE: Primary | ICD-10-CM

## 2024-09-09 DIAGNOSIS — T46.2X1A HYPOTHYROIDISM DUE TO AMIODARONE: Primary | ICD-10-CM

## 2024-09-09 LAB
ALBUMIN SERPL-MCNC: 4.1 G/DL (ref 3.5–5.2)
ALBUMIN UR-MCNC: <1.2 MG/DL
ALBUMIN/GLOB SERPL: 1.4 G/DL
ALP SERPL-CCNC: 109 U/L (ref 39–117)
ALT SERPL W P-5'-P-CCNC: 20 U/L (ref 1–33)
ANION GAP SERPL CALCULATED.3IONS-SCNC: 11 MMOL/L (ref 5–15)
AST SERPL-CCNC: 25 U/L (ref 1–32)
BILIRUB SERPL-MCNC: 0.6 MG/DL (ref 0–1.2)
BUN SERPL-MCNC: 27 MG/DL (ref 8–23)
BUN/CREAT SERPL: 18.5 (ref 7–25)
CALCIUM SPEC-SCNC: 9.5 MG/DL (ref 8.6–10.5)
CHLORIDE SERPL-SCNC: 104 MMOL/L (ref 98–107)
CHOLEST SERPL-MCNC: 180 MG/DL (ref 0–200)
CO2 SERPL-SCNC: 26 MMOL/L (ref 22–29)
CREAT SERPL-MCNC: 1.46 MG/DL (ref 0.57–1)
CREAT UR-MCNC: 87.1 MG/DL
EGFRCR SERPLBLD CKD-EPI 2021: 34.3 ML/MIN/1.73
GLOBULIN UR ELPH-MCNC: 2.9 GM/DL
GLUCOSE SERPL-MCNC: 151 MG/DL (ref 65–99)
HBA1C MFR BLD: 6.5 % (ref 4.8–5.6)
HDLC SERPL QL: 3.75
HDLC SERPL-MCNC: 48 MG/DL (ref 40–60)
LDLC SERPL CALC-MCNC: 103 MG/DL (ref 0–100)
MICROALBUMIN/CREAT UR: NORMAL MG/G{CREAT}
POTASSIUM SERPL-SCNC: 4.3 MMOL/L (ref 3.5–5.2)
PROT SERPL-MCNC: 7 G/DL (ref 6–8.5)
SODIUM SERPL-SCNC: 141 MMOL/L (ref 136–145)
TRIGL SERPL-MCNC: 164 MG/DL (ref 0–150)
VLDLC SERPL-MCNC: 29 MG/DL (ref 5–40)

## 2024-09-09 PROCEDURE — 80061 LIPID PANEL: CPT | Performed by: INTERNAL MEDICINE

## 2024-09-09 PROCEDURE — 83036 HEMOGLOBIN GLYCOSYLATED A1C: CPT | Performed by: INTERNAL MEDICINE

## 2024-09-09 PROCEDURE — 80053 COMPREHEN METABOLIC PANEL: CPT | Performed by: INTERNAL MEDICINE

## 2024-09-09 PROCEDURE — 82043 UR ALBUMIN QUANTITATIVE: CPT | Performed by: INTERNAL MEDICINE

## 2024-09-09 PROCEDURE — 82570 ASSAY OF URINE CREATININE: CPT | Performed by: INTERNAL MEDICINE

## 2024-09-09 PROCEDURE — 99213 OFFICE O/P EST LOW 20 MIN: CPT | Performed by: NURSE PRACTITIONER

## 2024-09-09 NOTE — PROGRESS NOTES
"Chief Complaint  Chief Complaint   Patient presents with    Hypothyroidism     Pt states that energy levels have been good, weight is higher than expected, no family hx of thyroid disease.       Subjective          History of Present Illness    Brunilda Antony 89 y.o. presents for a follow-up evaluation of Hypothyroidism due to Amiodarone use        Denies any personal history of thyroid disease until started on amiodarone - pt had intermittent, mildy elevated TSH which Dr. Romero thought was age-appropriate, but in 07/23 TSH was 17.900 with low FT4 at 0.84     Denies family history of thyroid disease/cancer        She complains of fatigue (better) and cold intolerance    Denies weight changes, dry skin, diarrhea, constipation, shortness of breath,chest pain, palpitations and heat intolerance.      Weight stable since last visit.      Current treatment is levothyroxine 88 mcg daily    Last labs in 09/24 showed TSH 2.890 and FT4 1.81              Other History     Pt was diagnosed with A.fib and CHF in 12/2022.  She was started on amiodarone in 01/2023.            I have reviewed the patient's allergies, medicines, past medical hx, family hx and social hx.    Objective   Vital Signs:   /78   Pulse 53   Temp 96.9 °F (36.1 °C) (Temporal)   Ht 162.6 cm (64.02\")   Wt 70.7 kg (155 lb 12.8 oz)   LMP  (LMP Unknown)   SpO2 97%   BMI 26.73 kg/m²       Physical Exam   Physical Exam  Constitutional:       General: She is not in acute distress.     Appearance: Normal appearance. She is not diaphoretic.   HENT:      Head: Normocephalic and atraumatic.   Eyes:      General:         Right eye: No discharge.         Left eye: No discharge.   Skin:     General: Skin is warm and dry.   Neurological:      Mental Status: She is alert.   Psychiatric:         Mood and Affect: Mood normal.         Behavior: Behavior normal.                    Results Review:   TSH   Date Value Ref Range Status   09/03/2024 2.890 0.270 - " 4.200 uIU/mL Final   02/26/2024 1.800 0.270 - 4.200 uIU/mL Final     T3, Free   Date Value Ref Range Status   07/28/2023 2.29 2.00 - 4.40 pg/mL Final         Assessment and Plan    Diagnoses and all orders for this visit:    1. 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    Continue with current dose of levothyroxine       No refills needed at this time      RTC in 6 months with me, labs prior      Follow Up     Patient was given instructions and counseling regarding her condition or for health maintenance advice. Please see specific information pulled into the AVS if appropriate.              Kaleigh Fagan, HOLLEY  09/09/24

## 2024-09-12 DIAGNOSIS — R74.8 ELEVATED CREATINE KINASE: Primary | ICD-10-CM

## 2024-09-12 RX ORDER — POTASSIUM CHLORIDE 1500 MG/1
20 TABLET, EXTENDED RELEASE ORAL DAILY PRN
Qty: 30 TABLET | Refills: 11 | Status: SHIPPED | OUTPATIENT
Start: 2024-09-12 | End: 2025-09-12

## 2024-09-14 DIAGNOSIS — I50.33 ACUTE ON CHRONIC DIASTOLIC CHF (CONGESTIVE HEART FAILURE): ICD-10-CM

## 2024-09-14 DIAGNOSIS — E11.22 TYPE 2 DIABETES MELLITUS WITH STAGE 3A CHRONIC KIDNEY DISEASE, WITHOUT LONG-TERM CURRENT USE OF INSULIN: Chronic | ICD-10-CM

## 2024-09-14 DIAGNOSIS — E78.2 MIXED HYPERLIPIDEMIA: Chronic | ICD-10-CM

## 2024-09-14 DIAGNOSIS — I50.41 ACUTE COMBINED SYSTOLIC AND DIASTOLIC CONGESTIVE HEART FAILURE: ICD-10-CM

## 2024-09-14 DIAGNOSIS — N18.31 TYPE 2 DIABETES MELLITUS WITH STAGE 3A CHRONIC KIDNEY DISEASE, WITHOUT LONG-TERM CURRENT USE OF INSULIN: Chronic | ICD-10-CM

## 2024-09-14 DIAGNOSIS — I48.19 PERSISTENT ATRIAL FIBRILLATION: Chronic | ICD-10-CM

## 2024-09-14 DIAGNOSIS — T46.2X1A HYPOTHYROIDISM DUE TO AMIODARONE: ICD-10-CM

## 2024-09-14 DIAGNOSIS — E03.2 HYPOTHYROIDISM DUE TO AMIODARONE: ICD-10-CM

## 2024-09-16 RX ORDER — LEVOTHYROXINE SODIUM 88 UG/1
88 TABLET ORAL EVERY MORNING
Qty: 90 TABLET | Refills: 1 | Status: SHIPPED | OUTPATIENT
Start: 2024-09-16

## 2024-09-16 RX ORDER — ROSUVASTATIN CALCIUM 10 MG/1
10 TABLET, COATED ORAL
Qty: 90 TABLET | Refills: 1 | Status: SHIPPED | OUTPATIENT
Start: 2024-09-16

## 2024-09-16 RX ORDER — AMIODARONE HYDROCHLORIDE 200 MG/1
200 TABLET ORAL DAILY
Qty: 90 TABLET | Refills: 1 | Status: SHIPPED | OUTPATIENT
Start: 2024-09-16

## 2024-10-14 ENCOUNTER — PATIENT MESSAGE (OUTPATIENT)
Dept: CARDIOLOGY | Facility: CLINIC | Age: 89
End: 2024-10-14
Payer: MEDICARE

## 2024-10-14 DIAGNOSIS — N18.31 STAGE 3A CHRONIC KIDNEY DISEASE: ICD-10-CM

## 2024-10-14 DIAGNOSIS — I50.33 ACUTE ON CHRONIC DIASTOLIC CHF (CONGESTIVE HEART FAILURE): ICD-10-CM

## 2024-10-29 ENCOUNTER — HOSPITAL ENCOUNTER (EMERGENCY)
Facility: HOSPITAL | Age: 89
Discharge: HOME | End: 2024-10-29
Attending: EMERGENCY MEDICINE
Payer: MEDICARE

## 2024-10-29 ENCOUNTER — TELEPHONE (OUTPATIENT)
Dept: INTERNAL MEDICINE | Age: 89
End: 2024-10-29

## 2024-10-29 ENCOUNTER — APPOINTMENT (OUTPATIENT)
Dept: GENERAL RADIOLOGY | Facility: HOSPITAL | Age: 89
End: 2024-10-29
Payer: MEDICARE

## 2024-10-29 VITALS
DIASTOLIC BLOOD PRESSURE: 65 MMHG | SYSTOLIC BLOOD PRESSURE: 162 MMHG | RESPIRATION RATE: 16 BRPM | OXYGEN SATURATION: 99 % | HEART RATE: 49 BPM | TEMPERATURE: 98.6 F

## 2024-10-29 DIAGNOSIS — M19.90 OSTEOARTHRITIS, UNSPECIFIED OSTEOARTHRITIS TYPE, UNSPECIFIED SITE: ICD-10-CM

## 2024-10-29 DIAGNOSIS — M25.571 ACUTE RIGHT ANKLE PAIN: Primary | ICD-10-CM

## 2024-10-29 LAB
ALBUMIN SERPL-MCNC: 3.3 G/DL (ref 3.5–5.2)
ALBUMIN/GLOB SERPL: 1 G/DL
ALP SERPL-CCNC: 99 U/L (ref 39–117)
ALT SERPL W P-5'-P-CCNC: 22 U/L (ref 1–33)
ANION GAP SERPL CALCULATED.3IONS-SCNC: 6.9 MMOL/L (ref 5–15)
AST SERPL-CCNC: 29 U/L (ref 1–32)
BASOPHILS # BLD AUTO: 0.06 10*3/MM3 (ref 0–0.2)
BASOPHILS NFR BLD AUTO: 0.6 % (ref 0–1.5)
BILIRUB SERPL-MCNC: 0.6 MG/DL (ref 0–1.2)
BUN SERPL-MCNC: 23 MG/DL (ref 8–23)
BUN/CREAT SERPL: 15.3 (ref 7–25)
CALCIUM SPEC-SCNC: 9.2 MG/DL (ref 8.6–10.5)
CHLORIDE SERPL-SCNC: 107 MMOL/L (ref 98–107)
CO2 SERPL-SCNC: 27.1 MMOL/L (ref 22–29)
CREAT SERPL-MCNC: 1.5 MG/DL (ref 0.57–1)
DEPRECATED RDW RBC AUTO: 47.2 FL (ref 37–54)
EGFRCR SERPLBLD CKD-EPI 2021: 33.2 ML/MIN/1.73
EOSINOPHIL # BLD AUTO: 0.13 10*3/MM3 (ref 0–0.4)
EOSINOPHIL NFR BLD AUTO: 1.3 % (ref 0.3–6.2)
ERYTHROCYTE [DISTWIDTH] IN BLOOD BY AUTOMATED COUNT: 14.3 % (ref 12.3–15.4)
GLOBULIN UR ELPH-MCNC: 3.4 GM/DL
GLUCOSE SERPL-MCNC: 129 MG/DL (ref 65–99)
HCT VFR BLD AUTO: 40.4 % (ref 34–46.6)
HGB BLD-MCNC: 12.9 G/DL (ref 12–15.9)
IMM GRANULOCYTES # BLD AUTO: 0.05 10*3/MM3 (ref 0–0.05)
IMM GRANULOCYTES NFR BLD AUTO: 0.5 % (ref 0–0.5)
LYMPHOCYTES # BLD AUTO: 1.44 10*3/MM3 (ref 0.7–3.1)
LYMPHOCYTES NFR BLD AUTO: 14 % (ref 19.6–45.3)
MCH RBC QN AUTO: 29.1 PG (ref 26.6–33)
MCHC RBC AUTO-ENTMCNC: 31.9 G/DL (ref 31.5–35.7)
MCV RBC AUTO: 91 FL (ref 79–97)
MONOCYTES # BLD AUTO: 0.8 10*3/MM3 (ref 0.1–0.9)
MONOCYTES NFR BLD AUTO: 7.8 % (ref 5–12)
NEUTROPHILS NFR BLD AUTO: 7.82 10*3/MM3 (ref 1.7–7)
NEUTROPHILS NFR BLD AUTO: 75.8 % (ref 42.7–76)
NRBC BLD AUTO-RTO: 0 /100 WBC (ref 0–0.2)
PLATELET # BLD AUTO: 237 10*3/MM3 (ref 140–450)
PMV BLD AUTO: 9.3 FL (ref 6–12)
POTASSIUM SERPL-SCNC: 4.4 MMOL/L (ref 3.5–5.2)
PROT SERPL-MCNC: 6.7 G/DL (ref 6–8.5)
RBC # BLD AUTO: 4.44 10*6/MM3 (ref 3.77–5.28)
SODIUM SERPL-SCNC: 141 MMOL/L (ref 136–145)
URATE SERPL-MCNC: 3.6 MG/DL (ref 2.4–5.7)
WBC NRBC COR # BLD AUTO: 10.3 10*3/MM3 (ref 3.4–10.8)

## 2024-10-29 PROCEDURE — 80053 COMPREHEN METABOLIC PANEL: CPT | Performed by: PHYSICIAN ASSISTANT

## 2024-10-29 PROCEDURE — 36415 COLL VENOUS BLD VENIPUNCTURE: CPT

## 2024-10-29 PROCEDURE — 85025 COMPLETE CBC W/AUTO DIFF WBC: CPT | Performed by: PHYSICIAN ASSISTANT

## 2024-10-29 PROCEDURE — 36415 COLL VENOUS BLD VENIPUNCTURE: CPT | Performed by: PHYSICIAN ASSISTANT

## 2024-10-29 PROCEDURE — 73610 X-RAY EXAM OF ANKLE: CPT

## 2024-10-29 PROCEDURE — 99283 EMERGENCY DEPT VISIT LOW MDM: CPT

## 2024-10-29 PROCEDURE — 84550 ASSAY OF BLOOD/URIC ACID: CPT | Performed by: PHYSICIAN ASSISTANT

## 2024-10-29 RX ORDER — COLCHICINE 0.6 MG/1
0.6 TABLET ORAL DAILY
Qty: 7 TABLET | Refills: 0 | Status: SHIPPED | OUTPATIENT
Start: 2024-10-29 | End: 2024-11-05

## 2024-10-29 NOTE — TELEPHONE ENCOUNTER
Hub staff attempted to follow warm transfer process and was unsuccessful     Caller: Patrick Antony    Relationship to patient: Emergency Contact    Best call back number: [phone] (Mobile)     Patient is needing: PATIENT IS NEEDING AN APPOINTMENT FOR TODAY, DUE TO HER RIGHT ANKLE PAIN. PATIENT STATES THAT SHE IS HAVING TROUBLE WALKING DUE TO THE PAIN.    PLEASE CONTACT PATRICK TO ADVISE.    THANKS
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
## 2024-10-29 NOTE — TELEPHONE ENCOUNTER
Spoke to pt's daughter Nicole. [facility] does not have availability for today. Daughter stated that she didn't know if she should bring her to Dr. Office or ER. Daughter states that other than pt complaining about  pain pt does have a lot of fluid in her legs. I recommended that she take her to the ER for possible x-ray or fluid drainage. Daughter was agreeable.

## 2024-10-29 NOTE — DISCHARGE INSTRUCTIONS
To  prescriptions from pharmacy, take as instructed.  Discontinue colchicine if you develop diarrhea.  Follow-up with Dr. Walter in 1 week.  Return to the ED if symptoms worsen.

## 2024-10-30 NOTE — ED PROVIDER NOTES
EMERGENCY DEPARTMENT MD ATTESTATION NOTE    Room Number:  05/05  PCP: Pérez Wheat MD  Independent Historians: Patient and Family    HPI:  A complete HPI/ROS/PMH/PSH/SH/FH are unobtainable due to: None    Chronic or social conditions impacting patient care (Social Determinants of Health): None      Context: Brunilda Antony is a 89 y.o. female with a medical history of hypertension, hyperlipidemia, diabetes and arthritis who presents to the ED c/o acute pain in the right ankle for the past 2 to 3 days.  She describes it as an aching or dull pain throughout the entire ankle.  However she has not had any recent accidents or falls to explain this new pain.  She denies any fevers.  She has been trying to rest and elevate and ice the ankle at home.  She tried taking some Tylenol as well.  None of these efforts have provided much relief so far.  She denies any areas of swelling or joint pain in the other extremities.  She denies any chest pain or difficulties breathing.      Review of prior external notes (non-ED) -and- Review of prior external test results outside of this encounter: I independently reviewed the duplex scan of the bilateral lower extremities on December 17, 2023.  Report indicated normal findings without any evidence of DVT or superficial thrombophlebitis.    Prescription drug monitoring program review: MICHELE reviewed by Andry Meyers MD         PHYSICAL EXAM    I have reviewed the triage vital signs and nursing notes.    ED Triage Vitals   Temp Heart Rate Resp BP SpO2   10/29/24 1107 10/29/24 1107 10/29/24 1107 10/29/24 1127 10/29/24 1107   98.6 °F (37 °C) 53 16 142/87 98 %      Temp src Heart Rate Source Patient Position BP Location FiO2 (%)   -- -- -- -- --              Physical Exam  GENERAL: alert, no acute distress  SKIN: Warm, dry, no rashes  HENT: Normocephalic, atraumatic  EYES: no scleral icterus  CV: regular rhythm, regular rate  RESPIRATORY: normal effort, lungs clear  ABDOMEN: soft,  nontender, nondistended  MUSCULOSKELETAL: no deformity.  There is some mild to moderate edema noted to the bilateral feet and ankles symmetrically.  There is no erythema or induration of the soft tissues on either foot or ankle.  The right ankle is moderately tender to palpation throughout its entirety but patient does have normal range of motion for plantarflexion and dorsiflexion.  Both feet are warm and well-perfused with easily palpable pulses.  NEURO: alert, moves all extremities, follows commands      MEDICATIONS GIVEN IN ER  Medications - No data to display      ORDERS PLACED DURING THIS VISIT:  Orders Placed This Encounter   Procedures    XR Ankle 3+ View Right    Comprehensive Metabolic Panel    Uric Acid    CBC Auto Differential    CBC & Differential         PROCEDURES  Procedures            PROGRESS, DATA ANALYSIS, CONSULTS, AND MEDICAL DECISION MAKING  All labs have been independently interpreted by me.  All radiology studies have been reviewed by me. All EKG's have been independently viewed and interpreted by me.  Discussion below represents my analysis of pertinent findings related to patient's condition, differential diagnosis, treatment plan and final disposition.    Differential diagnosis includes but is not limited to ankle fracture, ankle sprain, arthritis, gout, septic arthritis.    Clinical Scores:                   ED Course as of 11/01/24 0938   Tue Oct 29, 2024   1220 WBC: 10.30 [KA]   1220 Hemoglobin: 12.9 [KA]   1221 My independent interpretation of the right ankle x-rays  fracture [KA]      ED Course User Index  [KA] Radha Treviño PA-C       MDM:   I independently interpreted the x-ray of the right ankle and my findings are: No fracture or dislocation evident.    I have reviewed all the labs from today's ED visit.  The white blood cell count is normal and patient is afebrile.  There are no worrisome external features of the ankle to suggest an acute infection.  Therefore I do not  suspect acute cellulitis or septic arthritis as the etiology.  I think her pain is more arthritic in nature.  Perhaps gout related although the uric acid level is normal range.  Her pain is only in the ankle joint itself and not involving the calf or lower leg area at all.  Therefore I do not suspect a DVT either.  There does not appear to be any further need for continued workup in the ED today.  I think she is okay for discharge home with plan for symptomatic management and follow-up with PCP in the outpatient setting.    COMPLEXITY OF CARE  Admission was considered but after careful review of the patient's presentation, physical examination, diagnostic results, and response to treatment the patient may be safely discharged with outpatient follow-up.    Please note that portions of this document were completed with a voice recognition program.    Note Disclaimer: At [facility], we believe that sharing information builds trust and better relationships. You are receiving this note because you recently visited [facility]. It is possible you will see health information before a provider has talked with you about it. This kind of information can be easy to misunderstand. To help you fully understand what it means for your health, we urge you to discuss this note with your provider.       Andry Meyers MD  11/01/24 0985

## 2024-11-07 DIAGNOSIS — N18.31 STAGE 3A CHRONIC KIDNEY DISEASE: ICD-10-CM

## 2024-11-07 DIAGNOSIS — I50.33 ACUTE ON CHRONIC DIASTOLIC CHF (CONGESTIVE HEART FAILURE): ICD-10-CM

## 2024-12-04 DIAGNOSIS — N18.31 STAGE 3A CHRONIC KIDNEY DISEASE: ICD-10-CM

## 2024-12-04 DIAGNOSIS — I50.33 ACUTE ON CHRONIC DIASTOLIC CHF (CONGESTIVE HEART FAILURE): ICD-10-CM

## 2024-12-06 ENCOUNTER — OFFICE VISIT (OUTPATIENT)
Dept: INTERNAL MEDICINE | Age: 89
End: 2024-12-06
Payer: MEDICARE

## 2024-12-06 ENCOUNTER — HOSPITAL ENCOUNTER (OUTPATIENT)
Facility: HOSPITAL | Age: 89
Discharge: HOME | End: 2024-12-06
Payer: MEDICARE

## 2024-12-06 VITALS
DIASTOLIC BLOOD PRESSURE: 78 MMHG | BODY MASS INDEX: 26.63 KG/M2 | OXYGEN SATURATION: 100 % | WEIGHT: 156 LBS | SYSTOLIC BLOOD PRESSURE: 112 MMHG | TEMPERATURE: 96.6 F | RESPIRATION RATE: 17 BRPM | HEIGHT: 64 IN | HEART RATE: 50 BPM

## 2024-12-06 DIAGNOSIS — J06.9 ACUTE URI: Primary | ICD-10-CM

## 2024-12-06 DIAGNOSIS — R05.1 ACUTE COUGH: ICD-10-CM

## 2024-12-06 LAB
EXPIRATION DATE: NORMAL
FLUAV AG UPPER RESP QL IA.RAPID: NOT DETECTED
FLUBV AG UPPER RESP QL IA.RAPID: NOT DETECTED
INTERNAL CONTROL: NORMAL
Lab: NORMAL
SARS-COV-2 AG UPPER RESP QL IA.RAPID: NOT DETECTED

## 2024-12-06 PROCEDURE — 99214 OFFICE O/P EST MOD 30 MIN: CPT | Performed by: PHYSICIAN ASSISTANT

## 2024-12-06 PROCEDURE — 71046 X-RAY EXAM CHEST 2 VIEWS: CPT

## 2024-12-06 PROCEDURE — 87428 SARSCOV & INF VIR A&B AG IA: CPT | Performed by: PHYSICIAN ASSISTANT

## 2024-12-06 PROCEDURE — 1126F AMNT PAIN NOTED NONE PRSNT: CPT | Performed by: PHYSICIAN ASSISTANT

## 2024-12-06 NOTE — PROGRESS NOTES
GUCCI JACOBSEN PA-C                  I  N  T  E  R  N  A  L    M  E  D  I  C  I  N  E         ENCOUNTER DATE:  12/06/2024    Brunilda Antony / [age] y.o. / female    OFFICE VISIT ENCOUNTER       CHIEF COMPLAINT / REASON FOR OFFICE VISIT     Cough (Pt went to immediate care last Friday and didn't do covid or flu swab; they gave her antibiotic. Its improving from the cough//Pt is using netti pot; ), Nasal Congestion (Two weeks; ), Nausea (Yesterday or day before ), and Fatigue      ASSESSMENT & PLAN     Problem List Items Addressed This Visit    None  Visit Diagnoses       Acute URI    -  Primary    Relevant Orders    POCT SARS-CoV-2 Antigen LETICIA + Flu (Completed)    Acute cough        Relevant Orders    XR Chest 2 View          Orders Placed This Encounter   Procedures    XR Chest 2 View     Order Specific Question:   Reason for Exam:     Answer:   Acute URI with productive Cough x 2 weeks; R/O Pneumonia/infection     Order Specific Question:   Does this patient have a diabetic monitoring/medication delivering device on?     Answer:   No     Order Specific Question:   Release to patient     Answer:   Routine Release [[id number]]    POCT SARS-CoV-2 Antigen LETICIA + Flu     Order Specific Question:   Release to patient     Answer:   Routine Release [[id number]]     No orders of the defined types were placed in this encounter.      SUMMARY/DISCUSSION  POCT Covid-Sars-2 & Influenza A/B screens both negative on today.   CXR to rule out signs of infection or pneumonia due to coughing fits leaving the patient short of breath.   If imaging positive for consolidation (Pneumonia) additional antibiotic therapy will be prescribed.       Next Appointment:   Return if symptoms worsen or fail to improve. And, for Follow-up with Dr. Wheat as scheduled 1/08/25.      VITAL SIGNS     Vitals:    12/06/24 1309   BP: 112/78   BP Location: Left arm   Patient Position: Sitting   Cuff Size: Adult   Pulse: 50   Resp: 17  "  Temp: 96.6 °F (35.9 °C)   TempSrc: Temporal   SpO2: 100%   Weight: 70.8 kg (156 lb)   Height: 162.6 cm (64.02\")       BP Readings from Last 3 Encounters:   12/06/24 112/78   11/29/24 (!) 181/69   10/29/24 162/65     Wt Readings from Last 3 Encounters:   12/06/24 70.8 kg (156 lb)   11/29/24 70.7 kg (155 lb 13.8 oz)   09/09/24 70.7 kg (155 lb 12.8 oz)     Body mass index is 26.76 kg/m².         No data to display                   MEDICATIONS AT THE TIME OF OFFICE VISIT     Current Outpatient Medications on File Prior to Visit   Medication Sig    Accu-Chek FastClix Lancets misc Use to check glucose once daily .  DM/ e11.9    acetaminophen (TYLENOL) 325 MG tablet Take 2 tablets by mouth Every 4 (Four) Hours As Needed for Mild Pain or Fever (temperature greater than 101F).    amiodarone (PACERONE) 200 MG tablet TAKE 1 TABLET BY MOUTH EVERY DAY    amLODIPine (NORVASC) 5 MG tablet Take 1 tablet by mouth Daily.    aspirin 81 MG EC tablet Take 1 tablet by mouth Daily.    Blood Glucose Monitoring Suppl (ACCU-CHEK HU PLUS) w/Device kit Use to check glucose once daily . DM2/E11.9    empagliflozin (Jardiance) 10 MG tablet tablet Take 1 tablet by mouth Daily for 14 days.    ferrous gluconate (FERGON) 324 MG tablet Take 1 tablet by mouth 5(five) times a week with breakfast. MONDAY THROUGH FRIDAY    furosemide (LASIX) 40 MG tablet Take 1 tablet by mouth Daily As Needed (leg swelling).    glucose blood (Accu-Chek Hu Plus) test strip Use to check glucose once daily . DM/ e11.9    influenza vac split high-dose (Fluzone High-Dose) 0.5 ML suspension prefilled syringe injection Inject 0.5 mL into the appropriate muscle as directed by prescriber.    levothyroxine (SYNTHROID, LEVOTHROID) 88 MCG tablet TAKE 1 TABLET BY MOUTH EVERY DAY IN THE MORNING    losartan (COZAAR) 25 MG tablet Take 1 tablet by mouth 2 (Two) Times a Day.    metoprolol succinate XL (TOPROL-XL) 25 MG 24 hr tablet Take 0.5 tablets by mouth Daily.    pantoprazole " (PROTONIX) 40 MG EC tablet Take 1 tablet by mouth 2 (Two) Times a Day Before Meals.    potassium chloride (KLOR-CON M20) 20 MEQ CR tablet Take 1 tablet by mouth Daily As Needed (take with lasix).    rOPINIRole (REQUIP) 0.25 MG tablet Take 1 tablet by mouth every night at bedtime.    rosuvastatin (CRESTOR) 10 MG tablet TAKE 1 TABLET BY MOUTH EVERY DAY AT NIGHT    RSV Pre-Fusion F A&B Vac Rcmb (Abrysvo) 120 MCG/0.5ML reconstituted solution injection Inject 0.5 mL into the appropriate muscle as directed by prescriber.    vitamin B-12 (CYANOCOBALAMIN) 1000 MCG tablet Take 1 tablet by mouth Daily.    vitamin D (ERGOCALCIFEROL) 1.25 MG (16497 UT) capsule capsule Take 1 capsule by mouth Every 14 (Fourteen) Days.    Diclofenac Sodium (Voltaren) 1 % gel gel Apply 4 g topically to the appropriate area as directed 3 (Three) Times a Day. (Patient not taking: Reported on 12/6/2024)     No current facility-administered medications on file prior to visit.          HISTORY OF PRESENT ILLNESS     Pt is a 88y/o wf with CHF, hypertension, hyperlipidemia, and acute bacterial sinusitis who presents with ongoing cough and congestion.    She presented to urgent care on 11/29/2024 after about 10 days of scratchiness of the throat, nasal congestion, sinus pressure, productive cough, and low oxygen reading at home.  No screen for COVID or influenza completed on that day.  She was diagnosed with bacterial sinusitis and prescribed doxycycline 100 mg twice daily for 7 days. She completed the antibiotic on yesterday. She states that her sinus congestion and cough are greatly improved, but is still dealing with post-nasal drainage and coughing. The cough is intermittent and productive, and worse first thing in the AM. She has a concern of the congestion getting down in to her lungs, as coughing fits sometimes leave her feeling short of breath. She denies fever, chills, nausea, diarrhea, or chest pain. She has also attempted use of nasal in  addition to antibiotics.     Patient Care Team:  Pérez Wheat MD as PCP - General (Internal Medicine)  DANIS Lara MD as Consulting Physician (Ophthalmology)  Marilyn Jolly MD as Consulting Physician (Dermatology)  Emily Randolph MD as Surgeon (Orthopedic Surgery)  Aaron Salinas MD as Cardiologist (Cardiology)  Kaleigh Fagan APRN as Nurse Practitioner (Nurse Practitioner)  Winston Cunningham MD as Consulting Physician (Cardiology)  Orlando Cash MD as Consulting Physician (Cardiology)  Juan M Schmid DPM as Consulting Physician (Podiatry)    REVIEW OF SYSTEMS     Review of Systems   As Per HPI.  All other systems negative.     PHYSICAL EXAMINATION     Physical Exam  Vitals and nursing note reviewed.   Constitutional:       General: She is not in acute distress.     Appearance: Normal appearance. She is not ill-appearing.   Cardiovascular:      Rate and Rhythm: Normal rate. Rhythm irregular.      Pulses: Normal pulses.      Heart sounds: Normal heart sounds. No murmur heard.     No friction rub. No gallop.   Pulmonary:      Effort: Pulmonary effort is normal. No tachypnea, bradypnea, accessory muscle usage or respiratory distress.      Breath sounds: No stridor. No decreased breath sounds, rhonchi or rales. Wheezes: mild.  Neurological:      Mental Status: She is alert.   Psychiatric:         Mood and Affect: Mood normal.         Behavior: Behavior normal.             REVIEWED DATA     Labs:     Lab Results   Component Value Date     10/29/2024    K 4.4 10/29/2024    CALCIUM 9.2 10/29/2024    AST 29 10/29/2024    ALT 22 10/29/2024    BUN 23 10/29/2024    CREATININE 1.50 (H) 10/29/2024    CREATININE 1.40 (H) 09/26/2024    CREATININE 1.46 (H) 09/09/2024    EGFRRESULT 36.0 (L) 09/26/2024     Lab Results   Component Value Date    HGBA1C 6.50 (H) 09/09/2024    HGBA1C 5.80 (H) 04/12/2024    HGBA1C 5.30 12/16/2023     Lab Results   Component Value Date     (H) 09/09/2024    LDL  91 06/20/2023    LDL 50 12/06/2021    HDL 48 09/09/2024    HDL 45 06/20/2023    TRIG 164 (H) 09/09/2024    TRIG 114 06/20/2023     Lab Results   Component Value Date    TSH 2.890 09/03/2024    TSH 1.800 02/26/2024    TSH 3.030 02/12/2024    FREET4 1.81 (H) 09/03/2024    FREET4 1.83 (H) 02/12/2024    FREET4 1.37 11/08/2023     Lab Results   Component Value Date    WBC 10.30 10/29/2024    HGB 12.9 10/29/2024     10/29/2024     Lab Results   Component Value Date    MALBCRERATIO  09/09/2024      Comment:      Unable to calculate             Imaging:               Medical Tests:               Summary of old records / correspondence / consultant report:             Request outside records:

## 2024-12-13 DIAGNOSIS — D50.8 OTHER IRON DEFICIENCY ANEMIA: ICD-10-CM

## 2024-12-13 RX ORDER — FERROUS GLUCONATE 324(38)MG
TABLET ORAL
Qty: 60 TABLET | Refills: 2 | Status: SHIPPED | OUTPATIENT
Start: 2024-12-13

## 2024-12-18 DIAGNOSIS — I50.33 ACUTE ON CHRONIC DIASTOLIC CHF (CONGESTIVE HEART FAILURE): ICD-10-CM

## 2024-12-18 DIAGNOSIS — N18.31 STAGE 3A CHRONIC KIDNEY DISEASE: ICD-10-CM

## 2024-12-19 DIAGNOSIS — E55.9 VITAMIN D DEFICIENCY: ICD-10-CM

## 2024-12-20 RX ORDER — ERGOCALCIFEROL 1.25 MG/1
50000 CAPSULE, LIQUID FILLED ORAL
Qty: 6 CAPSULE | Refills: 1 | Status: SHIPPED | OUTPATIENT
Start: 2024-12-20

## 2025-01-08 ENCOUNTER — OFFICE VISIT (OUTPATIENT)
Dept: INTERNAL MEDICINE | Age: OVER 89
End: 2025-01-08
Payer: MEDICARE

## 2025-01-08 VITALS
BODY MASS INDEX: 27.14 KG/M2 | TEMPERATURE: 96.9 F | OXYGEN SATURATION: 98 % | DIASTOLIC BLOOD PRESSURE: 84 MMHG | WEIGHT: 159 LBS | SYSTOLIC BLOOD PRESSURE: 136 MMHG | HEART RATE: 51 BPM | HEIGHT: 64 IN

## 2025-01-08 DIAGNOSIS — E11.22 TYPE 2 DIABETES MELLITUS WITH STAGE 3A CHRONIC KIDNEY DISEASE, WITHOUT LONG-TERM CURRENT USE OF INSULIN: Primary | Chronic | ICD-10-CM

## 2025-01-08 DIAGNOSIS — I10 PRIMARY HYPERTENSION: Chronic | ICD-10-CM

## 2025-01-08 DIAGNOSIS — N18.31 STAGE 3A CHRONIC KIDNEY DISEASE (CKD): Chronic | ICD-10-CM

## 2025-01-08 DIAGNOSIS — E78.2 MIXED HYPERLIPIDEMIA: Chronic | ICD-10-CM

## 2025-01-08 DIAGNOSIS — N18.31 TYPE 2 DIABETES MELLITUS WITH STAGE 3A CHRONIC KIDNEY DISEASE, WITHOUT LONG-TERM CURRENT USE OF INSULIN: Primary | Chronic | ICD-10-CM

## 2025-01-08 DIAGNOSIS — I50.30 HEART FAILURE WITH PRESERVED EJECTION FRACTION, BORDERLINE, CLASS II: Chronic | ICD-10-CM

## 2025-01-08 LAB — HBA1C MFR BLD: 5.9 % (ref 4.8–5.6)

## 2025-01-08 PROCEDURE — 1126F AMNT PAIN NOTED NONE PRSNT: CPT | Performed by: INTERNAL MEDICINE

## 2025-01-08 PROCEDURE — 1159F MED LIST DOCD IN RCRD: CPT | Performed by: INTERNAL MEDICINE

## 2025-01-08 PROCEDURE — G2211 COMPLEX E/M VISIT ADD ON: HCPCS | Performed by: INTERNAL MEDICINE

## 2025-01-08 PROCEDURE — 1160F RVW MEDS BY RX/DR IN RCRD: CPT | Performed by: INTERNAL MEDICINE

## 2025-01-08 PROCEDURE — 99214 OFFICE O/P EST MOD 30 MIN: CPT | Performed by: INTERNAL MEDICINE

## 2025-01-08 NOTE — PROGRESS NOTES
J  U  N  O  H    K  I  M ,   M  D                  I  N  T  E  R  N  A  L    M  E  D  I  C  I  N  E         ENCOUNTER DATE:  01/08/2025    Brunilda Antony / [age] y.o. / female    OFFICE VISIT ENCOUNTER       CHIEF COMPLAINT / REASON FOR OFFICE VISIT     Diabetes, Hyperlipidemia, Hypertension, and Stage 3a chronic kidney disease (CKD)      ASSESSMENT & PLAN     Problem List Items Addressed This Visit          High    Type 2 diabetes mellitus, without long-term current use of insulin - Primary (Chronic)    Overview     * Taken off metformin in the hospital due to worsening CKD (12/2022)    Continue Jardiance 10 mg daily (taking primarily for HF)         Relevant Medications    Blood Glucose Monitoring Suppl (ACCU-CHEK MICHELLE PLUS) w/Device kit    glucose blood (Accu-Chek Michelle Plus) test strip    Accu-Chek FastClix Lancets misc    amiodarone (PACERONE) 200 MG tablet    empagliflozin (Jardiance) 10 MG tablet tablet    Other Relevant Orders    Hemoglobin A1c       Medium    Hyperlipidemia (Chronic)    Overview     Continue rosuvastatin 10 mg daily         Relevant Medications    rosuvastatin (CRESTOR) 10 MG tablet    Hypertension (Chronic)    Relevant Medications    amLODIPine (NORVASC) 5 MG tablet    losartan (COZAAR) 25 MG tablet    metoprolol succinate XL (TOPROL-XL) 25 MG 24 hr tablet    furosemide (LASIX) 40 MG tablet       Low    Stage 3a chronic kidney disease (CKD) (Chronic)    Relevant Medications    losartan (COZAAR) 25 MG tablet    furosemide (LASIX) 40 MG tablet    Heart failure with preserved ejection fraction, borderline, class II (Chronic)    Relevant Medications    amLODIPine (NORVASC) 5 MG tablet    losartan (COZAAR) 25 MG tablet    metoprolol succinate XL (TOPROL-XL) 25 MG 24 hr tablet    amiodarone (PACERONE) 200 MG tablet     Orders Placed This Encounter   Procedures    Hemoglobin A1c     Order Specific Question:   Release to patient     Answer:   Routine Release [[id number]]  "    No orders of the defined types were placed in this encounter.      SUMMARY/DISCUSSION        TOTAL TIME OF ENCOUNTER:        Next Appointment with me: Visit date not found    Return in about 4 months (around 5/8/2025) for Reassess chronic medical problems.      VITAL SIGNS     Vitals:    01/08/25 1140   BP: 136/84   Pulse: 51   Temp: 96.9 °F (36.1 °C)   SpO2: 98%   Weight: 72.1 kg (159 lb)   Height: 162.6 cm (64.02\")       BP Readings from Last 3 Encounters:   01/08/25 136/84   12/06/24 112/78   11/29/24 (!) 181/69     Wt Readings from Last 3 Encounters:   01/08/25 72.1 kg (159 lb)   12/06/24 70.8 kg (156 lb)   11/29/24 70.7 kg (155 lb 13.8 oz)     Body mass index is 27.28 kg/m².    Blood pressure readings recorded on patient flowsheet:       No data to display                  MEDICATIONS AT THE TIME OF OFFICE VISIT     Current Outpatient Medications on File Prior to Visit   Medication Sig    Accu-Chek FastClix Lancets misc Use to check glucose once daily .  DM/ e11.9    acetaminophen (TYLENOL) 325 MG tablet Take 2 tablets by mouth Every 4 (Four) Hours As Needed for Mild Pain or Fever (temperature greater than 101F).    amiodarone (PACERONE) 200 MG tablet TAKE 1 TABLET BY MOUTH EVERY DAY    amLODIPine (NORVASC) 5 MG tablet Take 1 tablet by mouth Daily.    aspirin 81 MG EC tablet Take 1 tablet by mouth Daily.    Blood Glucose Monitoring Suppl (ACCU-CHEK HU PLUS) w/Device kit Use to check glucose once daily . DM2/E11.9    empagliflozin (Jardiance) 10 MG tablet tablet Take 1 tablet by mouth Daily.    ferrous gluconate (FERGON) 324 MG tablet TAKE 1 TABLET BY MOUTH 5(FIVE) TIMES A WEEK WITH BREAKFAST. MONDAY THROUGH FRIDAY    furosemide (LASIX) 40 MG tablet Take 1 tablet by mouth Daily As Needed (leg swelling).    glucose blood (Accu-Chek Hu Plus) test strip Use to check glucose once daily . DM/ e11.9    influenza vac split high-dose (Fluzone High-Dose) 0.5 ML suspension prefilled syringe injection Inject 0.5 " mL into the appropriate muscle as directed by prescriber.    levothyroxine (SYNTHROID, LEVOTHROID) 88 MCG tablet TAKE 1 TABLET BY MOUTH EVERY DAY IN THE MORNING    losartan (COZAAR) 25 MG tablet Take 1 tablet by mouth 2 (Two) Times a Day.    metoprolol succinate XL (TOPROL-XL) 25 MG 24 hr tablet Take 0.5 tablets by mouth Daily.    pantoprazole (PROTONIX) 40 MG EC tablet Take 1 tablet by mouth 2 (Two) Times a Day Before Meals.    potassium chloride (KLOR-CON M20) 20 MEQ CR tablet Take 1 tablet by mouth Daily As Needed (take with lasix).    rOPINIRole (REQUIP) 0.25 MG tablet Take 1 tablet by mouth every night at bedtime.    rosuvastatin (CRESTOR) 10 MG tablet TAKE 1 TABLET BY MOUTH EVERY DAY AT NIGHT    RSV Pre-Fusion F A&B Vac Rcmb (Abrysvo) 120 MCG/0.5ML reconstituted solution injection Inject 0.5 mL into the appropriate muscle as directed by prescriber.    vitamin B-12 (CYANOCOBALAMIN) 1000 MCG tablet Take 1 tablet by mouth Daily.    vitamin D (ERGOCALCIFEROL) 1.25 MG (78951 UT) capsule capsule TAKE 1 CAPSULE BY MOUTH EVERY 14 DAYS.     No current facility-administered medications on file prior to visit.          HISTORY OF PRESENT ILLNESS     Patient denies any significant problems or changes.  She sees her cardiologist for management of congestive heart failure which remains clinically stable.  She is on amiodarone 200 mg daily for atrial fibrillation.  Hypertension remains well-controlled on current meds including losartan 25 mg twice daily, metoprolol succinate 25 mg 1/2 tablet daily and amlodipine 5 mg daily.  She is on Jardiance 10 mg daily for diabetes and heart failure as well as for kidney protection.  Denies any genitourinary infection problems.  She is on rosuvastatin 10 mg for hyperlipidemia and levothyroxine 88 mcg for hypothyroidism.  She remains on pantoprazole 40 mg twice daily for GERD.    Patient Care Team:  Pérez Wheat MD as PCP - General (Internal Medicine)  DANIS Lara MD as Consulting  Physician (Ophthalmology)  Marilyn Jolly MD as Consulting Physician (Dermatology)  Emily Randolph MD as Surgeon (Orthopedic Surgery)  Aaron Salinas MD as Cardiologist (Cardiology)  Kaleigh Fagan APRN as Nurse Practitioner (Nurse Practitioner)  Winston Cunningham MD as Consulting Physician (Cardiology)  Orlando Cash MD as Consulting Physician (Cardiology)  Juan M Schmid DPM as Consulting Physician (Podiatry)    REVIEW OF SYSTEMS     Review of Systems       PHYSICAL EXAMINATION     Physical Exam  Alert with normal thought and judgment.   Cardiovascular: Normal rate, regular rhythm.   Pulm/Chest: Effort normal, breath sounds normal.         REVIEWED DATA     Labs:     Lab Results   Component Value Date     10/29/2024    K 4.4 10/29/2024    CALCIUM 9.2 10/29/2024    AST 29 10/29/2024    ALT 22 10/29/2024    BUN 23 10/29/2024    CREATININE 1.50 (H) 10/29/2024    CREATININE 1.40 (H) 09/26/2024    CREATININE 1.46 (H) 09/09/2024    EGFRRESULT 36.0 (L) 09/26/2024     Lab Results   Component Value Date    HGBA1C 6.50 (H) 09/09/2024    HGBA1C 5.80 (H) 04/12/2024    HGBA1C 5.30 12/16/2023     Lab Results   Component Value Date     (H) 09/09/2024    LDL 91 06/20/2023    LDL 50 12/06/2021    HDL 48 09/09/2024    HDL 45 06/20/2023    TRIG 164 (H) 09/09/2024    TRIG 114 06/20/2023     Lab Results   Component Value Date    TSH 2.890 09/03/2024    TSH 1.800 02/26/2024    TSH 3.030 02/12/2024    FREET4 1.81 (H) 09/03/2024    FREET4 1.83 (H) 02/12/2024    FREET4 1.37 11/08/2023     Lab Results   Component Value Date    WBC 10.30 10/29/2024    HGB 12.9 10/29/2024     10/29/2024     Lab Results   Component Value Date    MALBCRERATIO  09/09/2024      Comment:      Unable to calculate           Imaging:               Medical Tests:               Summary of old records / correspondence / consultant report:             Request outside records:

## 2025-02-06 ENCOUNTER — OFFICE VISIT (OUTPATIENT)
Age: OVER 89
End: 2025-02-06
Payer: MEDICARE

## 2025-02-06 VITALS
DIASTOLIC BLOOD PRESSURE: 68 MMHG | HEART RATE: 51 BPM | WEIGHT: 159 LBS | SYSTOLIC BLOOD PRESSURE: 152 MMHG | HEIGHT: 64 IN | BODY MASS INDEX: 27.14 KG/M2

## 2025-02-06 DIAGNOSIS — I48.19 ATRIAL FIBRILLATION, PERSISTENT: Primary | ICD-10-CM

## 2025-02-06 PROCEDURE — 99214 OFFICE O/P EST MOD 30 MIN: CPT

## 2025-02-06 PROCEDURE — 1159F MED LIST DOCD IN RCRD: CPT

## 2025-02-06 PROCEDURE — 93000 ELECTROCARDIOGRAM COMPLETE: CPT

## 2025-02-06 PROCEDURE — 1160F RVW MEDS BY RX/DR IN RCRD: CPT

## 2025-02-06 NOTE — PROGRESS NOTES
Date of Office Visit: 2025  Encounter Provider: HOLLEY Vargas  Place of Service: [facility]  Patient Name: Brunilda Antony  :1934    Chief Complaint   Patient presents with    persistent AFIB    s/p TAVR   :     HPI: Brunilda Antony is a 89 y.o. female who follows with Dr. Cunningham for persistent atrial fibrillation.    She has a history of symptomatic persistent atrial fibrillation that has been well-controlled on amiodarone..    She also has history of TAVR.    She saw Dr. Cunningham last August and was doing well at that time.  No issues on amiodarone.  She was maintaining sinus rhythm.                    She presents today for follow-up appointment.    She has not had any recurrent atrial fibrillation since starting amiodarone.    She did develop thyroid disease and was started on Synthroid.  We discussed and felt that benefits outweighed the risks.    EKG shows normal sinus rhythm.    She remains on amiodarone 200 mg daily.    She has no complaints or concerns.       Past Medical History:   Diagnosis Date    Allergic     Anemia     Aortic valve stenosis     Arthritis     Asthma     Atrial fibrillation     Cataract     CHF (congestive heart failure)     Cholelithiasis     Chronic kidney disease     FOLLOWED BY PCP ONLY    Diabetes mellitus     GERD (gastroesophageal reflux disease)     Heart murmur     History of COVID-19 2023    HOSPITALIZED WITH COVID PNEUMONIA    History of syncope     Hyperlipidemia     Hypertension     Hypothyroidism     Multiple falls     Pneumonia     Restless legs syndrome        Past Surgical History:   Procedure Laterality Date    ANKLE OPEN REDUCTION INTERNAL FIXATION Left 2023    Procedure: LEFT ANKLE OPEN REDUCTION INTERNAL FIXATION;  Surgeon: Tonny Walter II, MD;  Location: [facility];  Service: Orthopedics;  Laterality: Left;    AORTIC VALVE REPAIR/REPLACEMENT N/A 2024    Procedure: TRANSFEMORAL  TRANSCATHETER AORTIC VALVE REPLACEMENT with intraoperative transthoracic echocardiogram and possible open surgical rescue;  Surgeon: Sukhjinder Brody MD;  Location: [facility] CVOR;  Service: Cardiothoracic;  Laterality: N/A;    AORTIC VALVE REPAIR/REPLACEMENT N/A 04/19/2024    Procedure: Transfemoral Transcatheter Aortic Valve Replacement with intraoperative transthoracic echocardiogram and possible open surgical rescue;  Surgeon: Orlando Cash MD;  Location: [facility] CVOR;  Service: Cardiovascular;  Laterality: N/A;    CARDIAC CATHETERIZATION N/A 03/29/2024    Procedure: Right and Left Heart Cath;  Surgeon: Orlando Cash MD;  Location: [facility] CATH INVASIVE LOCATION;  Service: Cardiovascular;  Laterality: N/A;    CARDIAC CATHETERIZATION N/A 03/29/2024    Procedure: Coronary angiography;  Surgeon: Orlando Cash MD;  Location: [facility] CATH INVASIVE LOCATION;  Service: Cardiovascular;  Laterality: N/A;    CATARACT EXTRACTION EXTRACAPSULAR W/ INTRAOCULAR LENS IMPLANTATION Bilateral     CHOLECYSTECTOMY      CHOLECYSTECTOMY WITH INTRAOPERATIVE CHOLANGIOGRAM N/A 08/11/2023    Procedure: CHOLECYSTECTOMY LAPAROSCOPIC INTRAOPERATIVE CHOLANGIOGRAM;  Surgeon: Lorena Olivares MD;  Location: [facility] MAIN OR;  Service: General;  Laterality: N/A;    COLONOSCOPY N/A 08/26/2023    Procedure: COLONOSCOPY TO CECUM/TI;  Surgeon: Juan M Mckoy MD;  Location: [facility] ENDOSCOPY;  Service: Gastroenterology;  Laterality: N/A;  pre: ANEMIA, GI BLEED  post: FAIR PREP, HEMORRHOIDS    ENDOSCOPY N/A 08/25/2023    Procedure: ESOPHAGOGASTRODUODENOSCOPY;  Surgeon: Orlando Pang MD;  Location: [facility] ENDOSCOPY;  Service: Gastroenterology;  Laterality: N/A;  Pre - GI Bleed    ERCP N/A 08/12/2023    Procedure: ENDOSCOPIC RETROGRADE CHOLANGIOPANCREATOGRAPHY with sphincterotomy and balloon sweep;  Surgeon: Orladno Huang MD;  Location: [facility] ENDOSCOPY;  Service: Gastroenterology;  Laterality: N/A;  PRE/POST - cbd stones     FRACTURE SURGERY  2023    Broken Fibula    HARDWARE REMOVAL Left 10/02/2023    Procedure: LEFT ANKLE HARDWARE REMOVAL;  Surgeon: Tonny Walter II, MD;  Location: [facility] OR [facility];  Service: Orthopedics;  Laterality: Left;    KNEE ARTHROSCOPY Right     SKIN CANCER EXCISION Left 2019    Left Malar       Social History     Socioeconomic History    Marital status:      Spouse name: Neel    Number of children: 4   Tobacco Use    Smoking status: Former     Current packs/day: 0.00     Types: Cigarettes     Quit date: 1961     Years since quittin.8     Passive exposure: Past    Smokeless tobacco: Never    Tobacco comments:      PPD X 15 YEARS    Vaping Use    Vaping status: Never Used   Substance and Sexual Activity    Alcohol use: Not Currently    Drug use: No    Sexual activity: Not Currently       Family History   Problem Relation Age of Onset    Hypertension Mother     Hypertension Father     Heart attack Father 55         at 55    Hyperlipidemia Father     Hyperlipidemia Sister     Hypertension Sister     Leukemia Sister     Coronary artery disease Brother         s/p cabg    Hypertension Brother     Breast cancer Daughter     Ovarian cancer Daughter     Colon cancer Neg Hx     Malig Hyperthermia Neg Hx        Review of Systems   Constitutional: Negative for chills, fever and malaise/fatigue.   Cardiovascular:  Negative for chest pain, dyspnea on exertion, leg swelling, near-syncope, orthopnea, palpitations, paroxysmal nocturnal dyspnea and syncope.   Respiratory:  Negative for cough and shortness of breath.    Hematologic/Lymphatic: Negative.    Musculoskeletal:  Negative for joint pain, joint swelling and myalgias.   Gastrointestinal:  Negative for abdominal pain, diarrhea, melena, nausea and vomiting.   Genitourinary:  Negative for frequency and hematuria.   Neurological:  Negative for light-headedness, numbness, paresthesias and seizures.   Allergic/Immunologic: Negative.     All other systems reviewed and are negative.      Allergies   Allergen Reactions    Digoxin Other (See Comments)     Digoxin toxicity     Atorvastatin Calcium Myalgia    Neosporin [Bacitracin-Polymyxin B] Swelling         Current Outpatient Medications:     Accu-Chek FastClix Lancets misc, Use to check glucose once daily .  DM/ e11.9, Disp: 100 each, Rfl: 1    acetaminophen (TYLENOL) 325 MG tablet, Take 2 tablets by mouth Every 4 (Four) Hours As Needed for Mild Pain or Fever (temperature greater than 101F)., Disp: 30 tablet, Rfl: 1    amiodarone (PACERONE) 200 MG tablet, TAKE 1 TABLET BY MOUTH EVERY DAY, Disp: 90 tablet, Rfl: 1    amLODIPine (NORVASC) 5 MG tablet, Take 1 tablet by mouth Daily., Disp: 90 tablet, Rfl: 1    aspirin 81 MG EC tablet, Take 1 tablet by mouth Daily., Disp: 30 tablet, Rfl: 1    Blood Glucose Monitoring Suppl (ACCU-CHEK HU PLUS) w/Device kit, Use to check glucose once daily . DM2/E11.9, Disp: 1 kit, Rfl: 0    empagliflozin (Jardiance) 10 MG tablet tablet, Take 1 tablet by mouth Daily., Disp: 30 tablet, Rfl: 11    ferrous gluconate (FERGON) 324 MG tablet, TAKE 1 TABLET BY MOUTH 5(FIVE) TIMES A WEEK WITH BREAKFAST. MONDAY THROUGH FRIDAY, Disp: 60 tablet, Rfl: 2    furosemide (LASIX) 40 MG tablet, Take 1 tablet by mouth Daily As Needed (leg swelling)., Disp: 30 tablet, Rfl: 11    glucose blood (Accu-Chek Hu Plus) test strip, Use to check glucose once daily . DM/ e11.9, Disp: 100 each, Rfl: 1    influenza vac split high-dose (Fluzone High-Dose) 0.5 ML suspension prefilled syringe injection, Inject 0.5 mL into the appropriate muscle as directed by prescriber., Disp: 0.5 mL, Rfl: 0    levothyroxine (SYNTHROID, LEVOTHROID) 88 MCG tablet, TAKE 1 TABLET BY MOUTH EVERY DAY IN THE MORNING, Disp: 90 tablet, Rfl: 1    losartan (COZAAR) 25 MG tablet, Take 1 tablet by mouth 2 (Two) Times a Day., Disp: 180 tablet, Rfl: 1    metoprolol succinate XL (TOPROL-XL) 25 MG 24 hr tablet, Take 0.5 tablets by  "mouth Daily., Disp: , Rfl:     pantoprazole (PROTONIX) 40 MG EC tablet, Take 1 tablet by mouth 2 (Two) Times a Day Before Meals., Disp: , Rfl:     potassium chloride (KLOR-CON M20) 20 MEQ CR tablet, Take 1 tablet by mouth Daily As Needed (take with lasix)., Disp: 30 tablet, Rfl: 11    rOPINIRole (REQUIP) 0.25 MG tablet, Take 1 tablet by mouth every night at bedtime., Disp: , Rfl:     rosuvastatin (CRESTOR) 10 MG tablet, TAKE 1 TABLET BY MOUTH EVERY DAY AT NIGHT, Disp: 90 tablet, Rfl: 1    RSV Pre-Fusion F A&B Vac Rcmb (Abrysvo) 120 MCG/0.5ML reconstituted solution injection, Inject 0.5 mL into the appropriate muscle as directed by prescriber., Disp: 0.5 mL, Rfl: 0    vitamin B-12 (CYANOCOBALAMIN) 1000 MCG tablet, Take 1 tablet by mouth Daily., Disp: 30 tablet, Rfl: 5    vitamin D (ERGOCALCIFEROL) 1.25 MG (17573 UT) capsule capsule, TAKE 1 CAPSULE BY MOUTH EVERY 14 DAYS., Disp: 6 capsule, Rfl: 1      Objective:     Vitals:    02/06/25 1028   BP: 152/68   BP Location: Left arm   Patient Position: Sitting   Pulse: 51   Weight: 72.1 kg (159 lb)   Height: 162.6 cm (64.02\")     Body mass index is 27.28 kg/m².    PHYSICAL EXAM:    Vitals Reviewed.   General Appearance: No acute distress, well developed and well nourished.   Eyes: Conjunctiva and lids: No erythema, swelling, or discharge. Sclera non-icteric.   HENT: Atraumatic, normocephalic. External eyes, ears, and nose normal.   Respiratory: No signs of respiratory distress. Respiration rhythm and depth normal.   Clear to auscultation. No rales, crackles, rhonchi, or wheezing auscultated.   Cardiovascular:  Heart Rate and Rhythm: Normal, Heart Sounds: Normal S1 and S2. No S3 or S4 noted.  Gastrointestinal:  Abdomen soft, non-distended, non-tender.   Musculoskeletal: Normal movement of extremities  Skin: Warm and dry.   Psychiatric: Patient alert and oriented to person, place, and time. Speech and behavior appropriate. Normal mood and affect.       ECG 12 " Lead    Date/Time: 2/6/2025 12:21 PM  Performed by: Kwaku Portillo APRN    Authorized by: Kwaku Portillo APRN  Comparison: compared with previous ECG   Rhythm: sinus rhythm            Assessment:       Diagnosis Plan   1. Atrial fibrillation, persistent               Plan:   Persistent atrial fibrillation----her atrial fibrillation has been well-controlled on low-dose amiodarone.  We discussed risk, benefits, and alternatives and engaged in shared decision making elected to continue amiodarone at current dose.          She will follow-up in 6 months or sooner if she has any issues        As always, it has been a pleasure to participate in your patient's care.      Sincerely,         HOLLEY Vargas

## 2025-02-13 DIAGNOSIS — Z95.2 S/P TAVR (TRANSCATHETER AORTIC VALVE REPLACEMENT): Primary | ICD-10-CM

## 2025-02-14 DIAGNOSIS — I50.33 ACUTE ON CHRONIC DIASTOLIC CHF (CONGESTIVE HEART FAILURE): ICD-10-CM

## 2025-02-14 DIAGNOSIS — E11.22 TYPE 2 DIABETES MELLITUS WITH STAGE 3A CHRONIC KIDNEY DISEASE, WITHOUT LONG-TERM CURRENT USE OF INSULIN: Chronic | ICD-10-CM

## 2025-02-14 DIAGNOSIS — I50.41 ACUTE COMBINED SYSTOLIC AND DIASTOLIC CONGESTIVE HEART FAILURE: ICD-10-CM

## 2025-02-14 DIAGNOSIS — N18.31 TYPE 2 DIABETES MELLITUS WITH STAGE 3A CHRONIC KIDNEY DISEASE, WITHOUT LONG-TERM CURRENT USE OF INSULIN: Chronic | ICD-10-CM

## 2025-02-14 DIAGNOSIS — G25.81 RLS (RESTLESS LEGS SYNDROME): ICD-10-CM

## 2025-02-14 DIAGNOSIS — I48.19 PERSISTENT ATRIAL FIBRILLATION: Chronic | ICD-10-CM

## 2025-02-14 DIAGNOSIS — K21.9 GASTROESOPHAGEAL REFLUX DISEASE, UNSPECIFIED WHETHER ESOPHAGITIS PRESENT: ICD-10-CM

## 2025-02-14 DIAGNOSIS — I10 PRIMARY HYPERTENSION: Chronic | ICD-10-CM

## 2025-02-14 RX ORDER — PANTOPRAZOLE SODIUM 40 MG/1
40 TABLET, DELAYED RELEASE ORAL
Qty: 180 TABLET | Refills: 1 | Status: SHIPPED | OUTPATIENT
Start: 2025-02-14

## 2025-02-14 RX ORDER — AMIODARONE HYDROCHLORIDE 200 MG/1
200 TABLET ORAL DAILY
Qty: 90 TABLET | Refills: 1 | Status: CANCELLED | OUTPATIENT
Start: 2025-02-14

## 2025-02-14 RX ORDER — EMPAGLIFLOZIN 10 MG/1
10 TABLET, FILM COATED ORAL DAILY
Qty: 30 TABLET | Refills: 11 | Status: CANCELLED | OUTPATIENT
Start: 2025-02-14

## 2025-02-14 RX ORDER — ROPINIROLE 0.25 MG/1
TABLET, FILM COATED ORAL
Qty: 90 TABLET | Refills: 1 | Status: SHIPPED | OUTPATIENT
Start: 2025-02-14

## 2025-02-14 RX ORDER — METOPROLOL SUCCINATE 25 MG/1
12.5 TABLET, EXTENDED RELEASE ORAL DAILY
Qty: 45 TABLET | Refills: 1 | Status: SHIPPED | OUTPATIENT
Start: 2025-02-14

## 2025-02-14 RX ORDER — AMLODIPINE BESYLATE 5 MG/1
5 TABLET ORAL DAILY
Qty: 90 TABLET | Refills: 1 | Status: CANCELLED | OUTPATIENT
Start: 2025-02-14

## 2025-02-14 RX ORDER — AMLODIPINE BESYLATE 5 MG/1
5 TABLET ORAL DAILY
Qty: 90 TABLET | Refills: 1 | Status: SHIPPED | OUTPATIENT
Start: 2025-02-14

## 2025-02-17 DIAGNOSIS — E78.2 MIXED HYPERLIPIDEMIA: Chronic | ICD-10-CM

## 2025-02-17 RX ORDER — ROSUVASTATIN CALCIUM 10 MG/1
10 TABLET, COATED ORAL
Qty: 90 TABLET | Refills: 1 | Status: SHIPPED | OUTPATIENT
Start: 2025-02-17

## 2025-03-03 ENCOUNTER — LAB (OUTPATIENT)
Dept: ENDOCRINOLOGY | Age: OVER 89
End: 2025-03-03
Payer: MEDICARE

## 2025-03-03 DIAGNOSIS — E03.2 HYPOTHYROIDISM DUE TO AMIODARONE: ICD-10-CM

## 2025-03-03 DIAGNOSIS — T46.2X1A HYPOTHYROIDISM DUE TO AMIODARONE: ICD-10-CM

## 2025-03-03 LAB — TSH SERPL DL<=0.005 MIU/L-ACNC: 2.84 UIU/ML (ref 0.27–4.2)

## 2025-03-10 ENCOUNTER — OFFICE VISIT (OUTPATIENT)
Dept: ENDOCRINOLOGY | Age: OVER 89
End: 2025-03-10
Payer: MEDICARE

## 2025-03-10 ENCOUNTER — OFFICE VISIT (OUTPATIENT)
Dept: INTERNAL MEDICINE | Age: OVER 89
End: 2025-03-10
Payer: MEDICARE

## 2025-03-10 VITALS
BODY MASS INDEX: 27.49 KG/M2 | HEART RATE: 52 BPM | DIASTOLIC BLOOD PRESSURE: 78 MMHG | WEIGHT: 161 LBS | HEIGHT: 64 IN | TEMPERATURE: 97.8 F | SYSTOLIC BLOOD PRESSURE: 154 MMHG | OXYGEN SATURATION: 99 %

## 2025-03-10 VITALS
HEART RATE: 49 BPM | OXYGEN SATURATION: 96 % | SYSTOLIC BLOOD PRESSURE: 150 MMHG | WEIGHT: 162 LBS | HEIGHT: 64 IN | DIASTOLIC BLOOD PRESSURE: 76 MMHG | TEMPERATURE: 98.5 F | BODY MASS INDEX: 27.66 KG/M2

## 2025-03-10 DIAGNOSIS — T46.2X1A HYPOTHYROIDISM DUE TO AMIODARONE: Primary | ICD-10-CM

## 2025-03-10 DIAGNOSIS — N89.8 VAGINAL IRRITATION: Primary | ICD-10-CM

## 2025-03-10 DIAGNOSIS — E03.2 HYPOTHYROIDISM DUE TO AMIODARONE: Primary | ICD-10-CM

## 2025-03-10 LAB
BILIRUB BLD-MCNC: NEGATIVE MG/DL
CLARITY, POC: CLEAR
COLOR UR: YELLOW
EXPIRATION DATE: ABNORMAL
GLUCOSE UR STRIP-MCNC: ABNORMAL MG/DL
KETONES UR QL: NEGATIVE
LEUKOCYTE EST, POC: NEGATIVE
Lab: ABNORMAL
NITRITE UR-MCNC: NEGATIVE MG/ML
PH UR: 5 [PH] (ref 5–8)
PROT UR STRIP-MCNC: ABNORMAL MG/DL
RBC # UR STRIP: NEGATIVE /UL
SP GR UR: 1.02 (ref 1–1.03)
UROBILINOGEN UR QL: ABNORMAL

## 2025-03-10 PROCEDURE — G2211 COMPLEX E/M VISIT ADD ON: HCPCS | Performed by: NURSE PRACTITIONER

## 2025-03-10 PROCEDURE — 99213 OFFICE O/P EST LOW 20 MIN: CPT | Performed by: NURSE PRACTITIONER

## 2025-03-10 PROCEDURE — 81003 URINALYSIS AUTO W/O SCOPE: CPT | Performed by: NURSE PRACTITIONER

## 2025-03-10 PROCEDURE — 1126F AMNT PAIN NOTED NONE PRSNT: CPT | Performed by: NURSE PRACTITIONER

## 2025-03-10 RX ORDER — MICONAZOLE NITRATE 2 %
1 CREAM WITH APPLICATOR VAGINAL NIGHTLY
Qty: 45 G | Refills: 0 | Status: SHIPPED | OUTPATIENT
Start: 2025-03-10 | End: 2025-03-15

## 2025-03-10 NOTE — PROGRESS NOTES
"    I N T E R N A L  M E D I C I N E  DESTINI ORTIZ, APRN      ENCOUNTER DATE:  03/10/2025    Brunilda Antony / [age] y.o. / female      CHIEF COMPLAINT / REASON FOR OFFICE VISIT     Vaginitis      ASSESSMENT & PLAN     Problem List Items Addressed This Visit    None  Visit Diagnoses         Vaginal irritation    -  Primary    Relevant Medications    miconazole (MICOTIN) 2 % vaginal cream    Other Relevant Orders    POCT urinalysis dipstick, automated (Completed)    Urinalysis With Culture If Indicated - Urine, Clean Catch          Orders Placed This Encounter   Procedures    Urinalysis With Culture If Indicated - Urine, Clean Catch    POCT urinalysis dipstick, automated     New Medications Ordered This Visit   Medications    miconazole (MICOTIN) 2 % vaginal cream     Sig: Insert 1 applicator into the vagina Every Night for 5 days.     Dispense:  45 g     Refill:  0       SUMMARY/DISCUSSION  Patient is on amiodarone, unable to do Diflucan treat with Micatin vaginal cream.  Will go ahead and send out urine for urinalysis I do not suspect UTI with glucose and trace protein only.  She has started using new dryer sheets, recommend switching to minimal scented  If no improvement in symptoms will need to consider a vaginal exam.      Next Appointment with me: Visit date not found    No follow-ups on file.      VITAL SIGNS     Visit Vitals  /76   Pulse (!) 49   Temp 98.5 °F (36.9 °C)   Ht 162.6 cm (64.02\")   Wt 73.5 kg (162 lb)   LMP  (LMP Unknown)   SpO2 96%   BMI 27.79 kg/m²       Wt Readings from Last 3 Encounters:   03/10/25 73.5 kg (162 lb)   03/10/25 73 kg (161 lb)   02/06/25 72.1 kg (159 lb)     Body mass index is 27.79 kg/m².      MEDICATIONS AT THE TIME OF OFFICE VISIT     Current Outpatient Medications on File Prior to Visit   Medication Sig    Accu-Chek FastClix Lancets misc Use to check glucose once daily .  DM/ e11.9    acetaminophen (TYLENOL) 325 MG tablet Take 2 tablets by mouth Every 4 (Four) Hours As " Needed for Mild Pain or Fever (temperature greater than 101F).    amiodarone (PACERONE) 200 MG tablet Take 1 tablet by mouth Daily.    amLODIPine (NORVASC) 5 MG tablet Take 1 tablet by mouth Daily.    aspirin 81 MG EC tablet Take 1 tablet by mouth Daily.    Blood Glucose Monitoring Suppl (ACCU-CHEK HU PLUS) w/Device kit Use to check glucose once daily . DM2/E11.9    empagliflozin (JARDIANCE) 10 MG tablet tablet Take 1 tablet by mouth Daily.    ferrous gluconate (FERGON) 324 MG tablet TAKE 1 TABLET BY MOUTH 5(FIVE) TIMES A WEEK WITH BREAKFAST. MONDAY THROUGH FRIDAY    furosemide (LASIX) 40 MG tablet Take 1 tablet by mouth Daily As Needed (leg swelling).    glucose blood (Accu-Chek Hu Plus) test strip Use to check glucose once daily . DM/ e11.9    levothyroxine (SYNTHROID, LEVOTHROID) 88 MCG tablet TAKE 1 TABLET BY MOUTH EVERY DAY IN THE MORNING    losartan (COZAAR) 25 MG tablet Take 1 tablet by mouth 2 (Two) Times a Day.    metoprolol succinate XL (TOPROL-XL) 25 MG 24 hr tablet TAKE 1/2 TABLET BY MOUTH DAILY    pantoprazole (PROTONIX) 40 MG EC tablet TAKE 1 TABLET BY MOUTH 2 TIMES A DAY BEFORE MEALS.    potassium chloride (KLOR-CON M20) 20 MEQ CR tablet Take 1 tablet by mouth Daily As Needed (take with lasix).    rOPINIRole (REQUIP) 0.25 MG tablet TAKE 1 TABLET BY MOUTH DAILY AT NIGHT 1 HOUR BEFORE BEDTIME    rosuvastatin (CRESTOR) 10 MG tablet TAKE 1 TABLET BY MOUTH EVERY DAY AT NIGHT    vitamin B-12 (CYANOCOBALAMIN) 1000 MCG tablet Take 1 tablet by mouth Daily.    vitamin D (ERGOCALCIFEROL) 1.25 MG (15774 UT) capsule capsule TAKE 1 CAPSULE BY MOUTH EVERY 14 DAYS.    [DISCONTINUED] influenza vac split high-dose (Fluzone High-Dose) 0.5 ML suspension prefilled syringe injection Inject 0.5 mL into the appropriate muscle as directed by prescriber. (Patient not taking: Reported on 3/10/2025)    [DISCONTINUED] RSV Pre-Fusion F A&B Vac Rcmb (Abrysvo) 120 MCG/0.5ML reconstituted solution injection Inject 0.5 mL into  the appropriate muscle as directed by prescriber. (Patient not taking: Reported on 3/10/2025)     No current facility-administered medications on file prior to visit.      HISTORY OF PRESENT ILLNESS     Vaginal itching and irritation, no dysuria, no frequency, feeling that she may not be emptying completely.  No nocturia, no visible hematuria.  No fever chills or worsening back or flank pain.  Patient is not sexually active.  No feeling of any obvious cyst or dry patches or plaques of the skin.    She has been on Jardiance long-term.  Last hemoglobin A1c good at 5.9.  No history of recurrent UTI or yeast infections with this medication.    REVIEW OF SYSTEMS     Constitutional neg except per HPI   Resp neg  CV neg    vaginal irritation    PHYSICAL EXAMINATION     Physical Exam  Constitutional  No distress  Musc neg CVA tenderness     REVIEWED DATA     Labs:   Lab Results   Component Value Date    GLUCOSE 129 (H) 10/29/2024    BUN 23 10/29/2024    CREATININE 1.50 (H) 10/29/2024    EGFR 33.2 (L) 10/29/2024    BCR 15.3 10/29/2024    K 4.4 10/29/2024    CO2 27.1 10/29/2024    CALCIUM 9.2 10/29/2024    ALBUMIN 3.3 (L) 10/29/2024    BILITOT 0.6 10/29/2024    AST 29 10/29/2024    ALT 22 10/29/2024     Lab Results   Component Value Date    WBC 10.30 10/29/2024    HGB 12.9 10/29/2024    HCT 40.4 10/29/2024    MCV 91.0 10/29/2024     10/29/2024     Lab Results   Component Value Date    CHOL 180 09/09/2024    CHLPL 114 12/06/2021    TRIG 164 (H) 09/09/2024    HDL 48 09/09/2024     (H) 09/09/2024      Lab Results   Component Value Date    TSH 2.840 03/03/2025     Brief Urine Lab Results  (Last result in the past 365 days)        Color   Clarity   Blood   Leuk Est   Nitrite   Protein   CREAT   Urine HCG        03/10/25 1215 Yellow   Clear   Negative   Negative   Negative   Trace                 Lab Results   Component Value Date    HGBA1C 5.90 (H) 01/08/2025       Imaging:           Medical Tests:            Summary of old records / correspondence / consultant report:           Request outside records:

## 2025-03-10 NOTE — PROGRESS NOTES
"Chief Complaint  Chief Complaint   Patient presents with    Hypothyroidism     Pt states that energy levels have been low, weight is higher than expected, no family hx of thyroid disease.       Subjective      History of Present Illness    Brunilda Antony 89 y.o. 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         Denies any personal history of thyroid disease until started on amiodarone   Pt had intermittent, mildy elevated TSH which Dr. Romero thought was age-appropriate, but in 07/23 TSH was 17.900 with low FT4 at 0.84       Denies family history of thyroid disease/cancer        She complains of fatigue and cold intolerance    Denies dry skin, diarrhea, constipation, shortness of breath,chest pain, palpitations and heat intolerance      Weight gain of 6 lbs since last visit.      Current treatment is levothyroxine 88 mcg daily      Last labs in 03/25 showed TSH 2.840           Other History     Pt was diagnosed with A.fib and CHF in 12/2022.  She was started on amiodarone in 01/2023.            I have reviewed the patient's allergies, medicines, past medical hx, family hx and social hx.    Objective   Vital Signs:   /78   Pulse 52   Temp 97.8 °F (36.6 °C) (Oral)   Ht 162.6 cm (64.02\")   Wt 73 kg (161 lb)   LMP  (LMP Unknown)   SpO2 99%   BMI 27.62 kg/m²       Physical Exam   Physical Exam  Constitutional:       General: She is not in acute distress.     Appearance: Normal appearance. She is not diaphoretic.   HENT:      Head: Normocephalic and atraumatic.   Eyes:      General:         Right eye: No discharge.         Left eye: No discharge.   Skin:     General: Skin is warm and dry.   Neurological:      Mental Status: She is alert.   Psychiatric:         Mood and Affect: Mood normal.         Behavior: Behavior normal.                    Results Review:   TSH   Date Value Ref Range Status   03/03/2025 2.840 " 0.270 - 4.200 uIU/mL Final   02/26/2024 1.800 0.270 - 4.200 uIU/mL Final     T3, Free   Date Value Ref Range Status   07/28/2023 2.29 2.00 - 4.40 pg/mL Final         Assessment and Plan    Diagnoses and all orders for this visit:    1. 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    Continue with current dose of levothyroxine         No refills needed at this time      RTC in 6 months with me, labs prior       Follow Up    Patient was given instructions and counseling regarding her condition or for health maintenance advice. Please see specific information pulled into the AVS if appropriate.              Kaleigh Fagan, HOLLEY  03/10/25

## 2025-03-12 LAB
APPEARANCE UR: CLEAR
BACTERIA #/AREA URNS HPF: ABNORMAL /[HPF]
BACTERIA UR CULT: NORMAL
BACTERIA UR CULT: NORMAL
BILIRUB UR QL STRIP: NEGATIVE
CASTS URNS QL MICRO: ABNORMAL /LPF
COLOR UR: YELLOW
EPI CELLS #/AREA URNS HPF: ABNORMAL /HPF (ref 0–10)
GLUCOSE UR QL STRIP: ABNORMAL
HGB UR QL STRIP: NEGATIVE
KETONES UR QL STRIP: NEGATIVE
LEUKOCYTE ESTERASE UR QL STRIP: ABNORMAL
MICRO URNS: ABNORMAL
NITRITE UR QL STRIP: NEGATIVE
PH UR STRIP: 5.5 [PH] (ref 5–7.5)
PROT UR QL STRIP: ABNORMAL
RBC #/AREA URNS HPF: ABNORMAL /HPF (ref 0–2)
SP GR UR STRIP: 1.02 (ref 1–1.03)
URINALYSIS REFLEX: ABNORMAL
UROBILINOGEN UR STRIP-MCNC: 0.2 MG/DL (ref 0.2–1)
WBC #/AREA URNS HPF: ABNORMAL /HPF (ref 0–5)

## 2025-03-13 ENCOUNTER — TELEPHONE (OUTPATIENT)
Dept: INTERNAL MEDICINE | Age: OVER 89
End: 2025-03-13
Payer: MEDICARE

## 2025-03-13 NOTE — TELEPHONE ENCOUNTER
Caller:     Patrick Antony        Relationship to patient: Self    Best call back number: [phone]     Patient is needing: PATIENT'S DAUGHTER CALLED IN, PATIENT CAME IN 3.10.25 TO BE SEEN FOR VAGINAL ITCHING.  IT WAS TREATED AS A YEAST INFECTION, HOWEVER PATIENT WAS NOT PHYSICALLY EXAMINED, JUST A URINE SAMPLE. SHE IS TAKING HER TREATMENT MEDICATION, BUT SHE DOESN'T FEEL LIKE THAT'S WHAT THE ISSUE IS.    SHE WOULD LIKE TO BE SEEN AGAIN, WITH A PHYSICAL EXAM TO FEEL MORE CONFIDENT IN THE TREATMENT. SHE IS CONCERNED OF MAYBE TAKING SOMETHING SHE DOESN'T NEED.    PLEASE CALL PATRICK, TO DISCUSS A PLAN OF ACTION.

## 2025-03-14 DIAGNOSIS — N89.8 VAGINAL ITCHING: Primary | ICD-10-CM

## 2025-03-21 DIAGNOSIS — N18.31 TYPE 2 DIABETES MELLITUS WITH STAGE 3A CHRONIC KIDNEY DISEASE, WITHOUT LONG-TERM CURRENT USE OF INSULIN: Primary | Chronic | ICD-10-CM

## 2025-03-21 DIAGNOSIS — N18.31 STAGE 3A CHRONIC KIDNEY DISEASE (CKD): Chronic | ICD-10-CM

## 2025-03-21 DIAGNOSIS — I50.30 HEART FAILURE WITH PRESERVED EJECTION FRACTION, BORDERLINE, CLASS II: Chronic | ICD-10-CM

## 2025-03-21 DIAGNOSIS — E11.22 TYPE 2 DIABETES MELLITUS WITH STAGE 3A CHRONIC KIDNEY DISEASE, WITHOUT LONG-TERM CURRENT USE OF INSULIN: Primary | Chronic | ICD-10-CM

## 2025-03-25 RX ORDER — AMIODARONE HYDROCHLORIDE 200 MG/1
200 TABLET ORAL DAILY
Qty: 43 TABLET | Refills: 0 | Status: SHIPPED | OUTPATIENT
Start: 2025-03-25

## 2025-04-10 ENCOUNTER — OFFICE VISIT (OUTPATIENT)
Dept: CARDIOLOGY | Facility: CLINIC | Age: OVER 89
End: 2025-04-10
Payer: MEDICARE

## 2025-04-10 VITALS
WEIGHT: 164 LBS | HEART RATE: 65 BPM | OXYGEN SATURATION: 95 % | DIASTOLIC BLOOD PRESSURE: 78 MMHG | SYSTOLIC BLOOD PRESSURE: 160 MMHG | BODY MASS INDEX: 28 KG/M2 | HEIGHT: 64 IN

## 2025-04-10 DIAGNOSIS — I50.30 HEART FAILURE WITH PRESERVED EJECTION FRACTION, BORDERLINE, CLASS II: Chronic | ICD-10-CM

## 2025-04-10 DIAGNOSIS — E78.2 MIXED HYPERLIPIDEMIA: Primary | Chronic | ICD-10-CM

## 2025-04-10 DIAGNOSIS — N18.31 STAGE 3A CHRONIC KIDNEY DISEASE (CKD): Chronic | ICD-10-CM

## 2025-04-10 DIAGNOSIS — Z95.2 HISTORY OF TRANSCATHETER AORTIC VALVE REPLACEMENT (TAVR): ICD-10-CM

## 2025-04-10 DIAGNOSIS — I10 PRIMARY HYPERTENSION: Chronic | ICD-10-CM

## 2025-04-10 DIAGNOSIS — I48.0 PAF (PAROXYSMAL ATRIAL FIBRILLATION): ICD-10-CM

## 2025-04-10 RX ORDER — VALSARTAN 80 MG/1
80 TABLET ORAL 2 TIMES DAILY
Qty: 60 TABLET | Refills: 11 | Status: SHIPPED | OUTPATIENT
Start: 2025-04-10 | End: 2026-04-10

## 2025-04-10 NOTE — PATIENT INSTRUCTIONS
Lets stop the losartan medication and going to replace it with a slightly stronger medication called valsartan.  You take 80 mg of valsartan twice per day.    If your blood pressure joo consistently greater than 140 with this new change, let us know and we can likely increase the amlodipine medication.  Let us know if we need to do this.  Give it about a week on this new regimen.  In the meantime, try to maintain a low-salt diet with up to 2500 mg of sodium per day.    Jardiance can sometimes cause UTIs and yeast infections because you pee out blood sugar.  Maybe hold off on taking this medication for a few days while you continue the yeast infection treatment.

## 2025-04-10 NOTE — PROGRESS NOTES
Subjective:     Encounter Date:04/10/25      Patient ID: Brunilda Antony is a 89 y.o. female.    Chief Complaint:   Chief Complaint   Patient presents with    Atrial Fibrillation   Follow-up CHF  Congestive Heart Failure    Atrial Fibrillation        Ms. Antony is an 89 y.o. lady past medical history hypertension, hyperlipidemia, type 2 diabetes who presents for follow-up.  Her initial presentation was due to new onset atrial fibrillation     Her clinical course due to treatments of atrial fibrillation, has been difficult since the start.  She presented due to new onset systolic and diastolic heart failure due to A. fib with RVR, and was diuresed.  She was sent out on a high dose of Lasix, and then was subsequently readmitted for digoxin toxicity related to an KARL related to overdiuresis.  She subsequently underwent rhythm control strategy and has maintained sinus rhythm on amiodarone.  She follows with EP.  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  She follows endocrinology for this.    She had a mechanical fall with a resultant fracture in her ankle.  Sounds like it was possibly a vagal episode as she was ambulating to the bathroom.  This was after a cholecystectomy for gallstone related complications and transaminase elevation.  She was having some orthostatic blood pressure shifts and Entresto was stopped in favor of losartan.  She was taken off Xarelto due to no recurrence of atrial fibrillation, and recurrent GI bleeding and significant anemia on this.  Her anemia has subsequently stabilized.  But she remains off DOAC.    She was hospitalized earlier 2024 this year for bacterial pneumonia after COVID infection.  Jardiance was resumed after it was held during her illness.    Her EF had recovered, which revealed that her aortic valve  stenosis was progressing.  She was noted to have severe AS.  She subsequently underwent a TAVR with Medtronic evolute fx April 2024    Otherwise, no new complaints today.  She remains in sinus rhythm.       Lower extremity edema is nearly absent.  She struggled a bit with a yeast infection recently and has an OB/GYN appointment upcoming.  She still takes the Jardiance.    Her blood pressure has been a bit up recently.           The following portions of the patient's history were reviewed and updated as appropriate: allergies, current medications, past family history, past medical history, past social history, past surgical history and problem list.    Past Medical History:   Diagnosis Date    Allergic     Anemia     Aortic valve stenosis     Arthritis     Asthma     Atrial fibrillation     Cataract     CHF (congestive heart failure)     Cholelithiasis     Chronic kidney disease     FOLLOWED BY PCP ONLY    Clotting disorder     Diabetes mellitus     GERD (gastroesophageal reflux disease)     Heart murmur     History of COVID-19 12/2023    HOSPITALIZED WITH COVID PNEUMONIA    History of syncope     Hyperlipidemia     Hypertension     Hypothyroidism     Multiple falls     Pneumonia     Restless legs syndrome        Past Surgical History:   Procedure Laterality Date    ANKLE OPEN REDUCTION INTERNAL FIXATION Left 08/27/2023    Procedure: LEFT ANKLE OPEN REDUCTION INTERNAL FIXATION;  Surgeon: Tonny Walter II, MD;  Location: [facility];  Service: Orthopedics;  Laterality: Left;    AORTIC VALVE REPAIR/REPLACEMENT N/A 04/19/2024    Procedure: TRANSFEMORAL TRANSCATHETER AORTIC VALVE REPLACEMENT with intraoperative transthoracic echocardiogram and possible open surgical rescue;  Surgeon: Sukhjinder Brody MD;  Location: [facility];  Service: Cardiothoracic;  Laterality: N/A;    AORTIC VALVE REPAIR/REPLACEMENT N/A 04/19/2024    Procedure: Transfemoral Transcatheter Aortic Valve Replacement with  intraoperative transthoracic echocardiogram and possible open surgical rescue;  Surgeon: Orlando Cash MD;  Location: [facility];  Service: Cardiovascular;  Laterality: N/A;    CARDIAC CATHETERIZATION N/A 03/29/2024    Procedure: Right and Left Heart Cath;  Surgeon: Orlando Cash MD;  Location: [facility] CATH INVASIVE LOCATION;  Service: Cardiovascular;  Laterality: N/A;    CARDIAC CATHETERIZATION N/A 03/29/2024    Procedure: Coronary angiography;  Surgeon: Orlando Cash MD;  Location: [facility] CATH INVASIVE LOCATION;  Service: Cardiovascular;  Laterality: N/A;    CARDIAC VALVE REPLACEMENT      CATARACT EXTRACTION EXTRACAPSULAR W/ INTRAOCULAR LENS IMPLANTATION Bilateral     CHOLECYSTECTOMY      CHOLECYSTECTOMY WITH INTRAOPERATIVE CHOLANGIOGRAM N/A 08/11/2023    Procedure: CHOLECYSTECTOMY LAPAROSCOPIC INTRAOPERATIVE CHOLANGIOGRAM;  Surgeon: Lorena Olivares MD;  Location: [facility] MAIN OR;  Service: General;  Laterality: N/A;    COLONOSCOPY N/A 08/26/2023    Procedure: COLONOSCOPY TO CECUM/TI;  Surgeon: Juan M Mckoy MD;  Location: [facility] ENDOSCOPY;  Service: Gastroenterology;  Laterality: N/A;  pre: ANEMIA, GI BLEED  post: FAIR PREP, HEMORRHOIDS    ENDOSCOPY N/A 08/25/2023    Procedure: ESOPHAGOGASTRODUODENOSCOPY;  Surgeon: Orlando Pang MD;  Location: [facility] ENDOSCOPY;  Service: Gastroenterology;  Laterality: N/A;  Pre - GI Bleed    ERCP N/A 08/12/2023    Procedure: ENDOSCOPIC RETROGRADE CHOLANGIOPANCREATOGRAPHY with sphincterotomy and balloon sweep;  Surgeon: Orlando Huang MD;  Location: [facility] ENDOSCOPY;  Service: Gastroenterology;  Laterality: N/A;  PRE/POST - cbd stones    FRACTURE SURGERY  8/27/2023    Broken Fibula    HARDWARE REMOVAL Left 10/02/2023    Procedure: LEFT ANKLE HARDWARE REMOVAL;  Surgeon: Tonny Walter II, MD;  Location: [facility];  Service: Orthopedics;  Laterality: Left;    KNEE ARTHROSCOPY Right     SKIN CANCER EXCISION Left 02/07/2019    Left Malar  "        Procedures       Objective:     Vitals:    04/10/25 1454   BP: 160/78   Pulse: 65   SpO2: 95%   Weight: 74.4 kg (164 lb)   Height: 162.6 cm (64.02\")               Constitutional:       Appearance: Not in distress.      Comments: Ambulates freely without assistance   Neck:      Vascular: JVD normal.   Pulmonary:      Effort: Pulmonary effort is normal.      Breath sounds: Normal air entry.   Cardiovascular:      PMI at left midclavicular line. Bradycardia present. Regular rhythm. Normal S2.       Murmurs: There is a grade 1/6 high frequency blowing holosystolic murmur at the apex.   Pulses:     Intact distal pulses.   Edema:     Peripheral edema present.     Feet: trace edema of the left foot.  Skin:     General: Skin is warm and dry.   Neurological:      General: No focal deficit present.      Mental Status: Alert, oriented to person, place, and time and oriented to person, place and time.   Psychiatric:         Mood and Affect: Mood and affect normal.         Lab Review:     Lipid Panel          9/9/2024    09:15   Lipid Panel   Total Cholesterol 180    Triglycerides 164    HDL Cholesterol 48    VLDL Cholesterol 29    LDL Cholesterol  103        BUN   Date Value Ref Range Status   10/29/2024 23 8 - 23 mg/dL Final     Creatinine   Date Value Ref Range Status   10/29/2024 1.50 (H) 0.57 - 1.00 mg/dL Final   04/04/2024 1.30 0.60 - 1.30 mg/dL Final     Comment:     Serial Number: [id number]:  [id number]     Potassium   Date Value Ref Range Status   10/29/2024 4.4 3.5 - 5.2 mmol/L Final     ALT (SGPT)   Date Value Ref Range Status   10/29/2024 22 1 - 33 U/L Final     AST (SGOT)   Date Value Ref Range Status   10/29/2024 29 1 - 32 U/L Final         Performed        Assessment:          Diagnosis Plan   1. Mixed hyperlipidemia        2. Primary hypertension        3. Stage 3a chronic kidney disease (CKD)        4. Heart failure with preserved ejection fraction, borderline, class II        5. History of " transcatheter aortic valve replacement (TAVR)        6. PAF (paroxysmal atrial fibrillation)                       Plan:           Atrial fibrillation, paroxysmal, very symptomatic  Sinus bradycardia  On metoprolol succinate 12.5 XL daily.  Heart rate around 60 at rest  Amiodarone 200 daily, needed to maintain sinus rhythm   She had recurrent bleeding events and GI bleeding with unclear source.  She also had a mechanical fall with the ankle fracture.  She is maintained sinus rhythm on amiodarone, I think keeping her off therapeutic anticoagulation is appropriate.    We may need to revisit, perhaps with Eliquis 2.5, she develops A-fib again.    Amiodarone induced hypothyroidism  She is on Synthroid for this.  Would favor continuing amiodarone given how christa her course with A-fib was   Acute on chronic combined systolic and diastolic heart failure, EF 40 to 45% recovered to 65% with medical therapy  Continue Jardiance, she does have some recurrent edema.  She had elevated creatinine with diuretics.  Would favor SGLT2 over diuretic therapy  She did have what sounds to be a candidal yeast infection recently.  Encouraged her to hold off the Jardiance for a few days while this gets treated and then resume afterward.  This is the first yeast infection she has had in some time.  Would favor continued SGLT2 inhibitor   Severe aortic stenosis, likely uncovered after EF recovered.  She underwent TAVR with Dr. Elizabeth and Mary Grace April 19, 2024 with 26 mm Medtronic TAVR  Residual gradient mean 3 mmHg.  Much improved.  RVSP also improved.    She follows with Dr. Elizabeth with annual echoes.    She is on aspirin 81, no longer on DOAC per above.     CKD 3 a Cr has ranged from 1.1 - 1.6 in the past  Hypertension  She had some labile blood pressures on Entresto, this was stopped in favor of losartan.  BP a bit up today, replace losartan with valsartan 80 twice daily   On amlodipine 5.  SGLT2 inhibitor per  above  Hyperlipidemia: Continue rosuvastatin 10   History of digoxin toxicity in the setting of KARL and overdiuresis.   Avoiding any digoxin use      RTC 6 months.       Current Outpatient Medications:     Accu-Chek FastClix Lancets misc, Use to check glucose once daily .  DM/ e11.9, Disp: 100 each, Rfl: 1    acetaminophen (TYLENOL) 325 MG tablet, Take 2 tablets by mouth Every 4 (Four) Hours As Needed for Mild Pain or Fever (temperature greater than 101F)., Disp: 30 tablet, Rfl: 1    amiodarone (PACERONE) 200 MG tablet, Take 1 tablet by mouth Daily., Disp: 43 tablet, Rfl: 0    amLODIPine (NORVASC) 5 MG tablet, Take 1 tablet by mouth Daily., Disp: 90 tablet, Rfl: 1    aspirin 81 MG EC tablet, Take 1 tablet by mouth Daily., Disp: 30 tablet, Rfl: 1    Blood Glucose Monitoring Suppl (ACCU-CHEK HU PLUS) w/Device kit, Use to check glucose once daily . DM2/E11.9, Disp: 1 kit, Rfl: 0    empagliflozin (JARDIANCE) 10 MG tablet tablet, Take 1 tablet by mouth Daily., Disp: 30 tablet, Rfl: 2    ferrous gluconate (FERGON) 324 MG tablet, TAKE 1 TABLET BY MOUTH 5(FIVE) TIMES A WEEK WITH BREAKFAST. MONDAY THROUGH FRIDAY, Disp: 60 tablet, Rfl: 2    furosemide (LASIX) 40 MG tablet, Take 1 tablet by mouth Daily As Needed (leg swelling)., Disp: 30 tablet, Rfl: 11    glucose blood (Accu-Chek Hu Plus) test strip, Use to check glucose once daily . DM/ e11.9, Disp: 100 each, Rfl: 1    levothyroxine (SYNTHROID, LEVOTHROID) 88 MCG tablet, TAKE 1 TABLET BY MOUTH EVERY DAY IN THE MORNING, Disp: 90 tablet, Rfl: 1    metoprolol succinate XL (TOPROL-XL) 25 MG 24 hr tablet, TAKE 1/2 TABLET BY MOUTH DAILY, Disp: 45 tablet, Rfl: 1    pantoprazole (PROTONIX) 40 MG EC tablet, TAKE 1 TABLET BY MOUTH 2 TIMES A DAY BEFORE MEALS., Disp: 180 tablet, Rfl: 1    potassium chloride (KLOR-CON M20) 20 MEQ CR tablet, Take 1 tablet by mouth Daily As Needed (take with lasix)., Disp: 30 tablet, Rfl: 11    rOPINIRole (REQUIP) 0.25 MG tablet, TAKE 1 TABLET BY  MOUTH DAILY AT NIGHT 1 HOUR BEFORE BEDTIME, Disp: 90 tablet, Rfl: 1    rosuvastatin (CRESTOR) 10 MG tablet, TAKE 1 TABLET BY MOUTH EVERY DAY AT NIGHT, Disp: 90 tablet, Rfl: 1    vitamin B-12 (CYANOCOBALAMIN) 1000 MCG tablet, Take 1 tablet by mouth Daily., Disp: 30 tablet, Rfl: 5    vitamin D (ERGOCALCIFEROL) 1.25 MG (12002 UT) capsule capsule, TAKE 1 CAPSULE BY MOUTH EVERY 14 DAYS., Disp: 6 capsule, Rfl: 1    valsartan (DIOVAN) 80 MG tablet, Take 1 tablet by mouth 2 (Two) Times a Day., Disp: 60 tablet, Rfl: 11         Return in about 6 months (around 10/10/2025).      **[employer] Disclaimer:   Much of this encounter note is an electronic transcription/translation of spoken language to printed text. The electronic translation of spoken language may permit erroneous, or at times, nonsensical words or phrases to be inadvertently transcribed. Although I have reviewed the note for such errors, some may still exist.

## 2025-04-23 DIAGNOSIS — N18.31 STAGE 3A CHRONIC KIDNEY DISEASE: Primary | ICD-10-CM

## 2025-04-24 ENCOUNTER — CLINICAL SUPPORT (OUTPATIENT)
Dept: CARDIOLOGY | Age: OVER 89
End: 2025-04-24
Payer: MEDICARE

## 2025-04-24 ENCOUNTER — LAB (OUTPATIENT)
Dept: LAB | Facility: HOSPITAL | Age: OVER 89
End: 2025-04-24
Payer: MEDICARE

## 2025-04-24 ENCOUNTER — RESULTS FOLLOW-UP (OUTPATIENT)
Dept: CARDIOLOGY | Facility: CLINIC | Age: OVER 89
End: 2025-04-24
Payer: MEDICARE

## 2025-04-24 DIAGNOSIS — N18.31 STAGE 3A CHRONIC KIDNEY DISEASE: ICD-10-CM

## 2025-04-24 LAB
ANION GAP SERPL CALCULATED.3IONS-SCNC: 9.2 MMOL/L (ref 5–15)
BUN SERPL-MCNC: 18 MG/DL (ref 8–23)
BUN/CREAT SERPL: 11.9 (ref 7–25)
CALCIUM SPEC-SCNC: 9.4 MG/DL (ref 8.2–9.6)
CHLORIDE SERPL-SCNC: 104 MMOL/L (ref 98–107)
CO2 SERPL-SCNC: 24.8 MMOL/L (ref 22–29)
CREAT SERPL-MCNC: 1.51 MG/DL (ref 0.57–1)
EGFRCR SERPLBLD CKD-EPI 2021: 32.7 ML/MIN/1.73
GLUCOSE SERPL-MCNC: 80 MG/DL (ref 65–99)
POTASSIUM SERPL-SCNC: 4.8 MMOL/L (ref 3.5–5.2)
SODIUM SERPL-SCNC: 138 MMOL/L (ref 136–145)

## 2025-04-24 PROCEDURE — 36415 COLL VENOUS BLD VENIPUNCTURE: CPT

## 2025-04-24 PROCEDURE — 80048 BASIC METABOLIC PNL TOTAL CA: CPT

## 2025-04-24 NOTE — TELEPHONE ENCOUNTER
Patient's daughter, Nicole, returned call and I let her know of the lab results.  She was with pt at BP appt yesterday so she is aware at the issue with home BP cuff.     Patient had 80 mg of valsartan this AM, she is taking BID.  She will remain on her current regimen as her BP appears well controlled.    Deepa Yuen RN  [facility] Triage  04/24/25 10:32 EDT

## 2025-04-24 NOTE — PROGRESS NOTES
Procedure   Procedures   Patient is here for a B/P check with Patient's home machine per Dr Salinas.  Medications were reviewed and are correct on med list   Patient is having no current symptoms  See Sheet Below:    I did advise patient she may need to get new machine since reading is off.   She is going to head to lab to get lab work.   Patient and Daughter are aware Dr Salinas is at [facility] today and that I will be sending this information to him.   I did let them know someone from the office with call them later with further instructions from Dr Salinas.   Mario ROSALES

## 2025-04-24 NOTE — PROGRESS NOTES
Please let patient know that her blood work is stable.  Her kidney function is completely stable compared to previous.  It looks like also her blood pressure is well-controlled, and her home blood pressure cuff is off by about 60 mmHg.    What ever the patient's current dose of valsartan is, she should continue that dose.  Going please verify her current dose of valsartan that she took this morning?  Thank you

## 2025-05-01 ENCOUNTER — PATIENT MESSAGE (OUTPATIENT)
Dept: INTERNAL MEDICINE | Age: OVER 89
End: 2025-05-01
Payer: MEDICARE

## 2025-05-01 DIAGNOSIS — G25.81 RLS (RESTLESS LEGS SYNDROME): Primary | ICD-10-CM

## 2025-05-02 RX ORDER — ROPINIROLE 0.25 MG/1
0.25 TABLET, FILM COATED ORAL
Qty: 90 TABLET | Refills: 1 | Status: SHIPPED | OUTPATIENT
Start: 2025-05-02

## 2025-05-07 ENCOUNTER — OFFICE VISIT (OUTPATIENT)
Age: OVER 89
End: 2025-05-07
Payer: MEDICARE

## 2025-05-07 ENCOUNTER — HOSPITAL ENCOUNTER (OUTPATIENT)
Dept: CARDIOLOGY | Facility: HOSPITAL | Age: OVER 89
Discharge: HOME | End: 2025-05-07
Admitting: INTERNAL MEDICINE
Payer: MEDICARE

## 2025-05-07 VITALS
DIASTOLIC BLOOD PRESSURE: 60 MMHG | SYSTOLIC BLOOD PRESSURE: 140 MMHG | BODY MASS INDEX: 27.86 KG/M2 | HEART RATE: 49 BPM | WEIGHT: 163.2 LBS | HEIGHT: 64 IN

## 2025-05-07 VITALS
HEIGHT: 64 IN | BODY MASS INDEX: 28 KG/M2 | SYSTOLIC BLOOD PRESSURE: 172 MMHG | DIASTOLIC BLOOD PRESSURE: 60 MMHG | HEART RATE: 55 BPM | WEIGHT: 164 LBS

## 2025-05-07 DIAGNOSIS — Z95.2 S/P TAVR (TRANSCATHETER AORTIC VALVE REPLACEMENT): ICD-10-CM

## 2025-05-07 DIAGNOSIS — I35.0 NONRHEUMATIC AORTIC VALVE STENOSIS: Primary | ICD-10-CM

## 2025-05-07 DIAGNOSIS — I50.32 HEART FAILURE DUE TO VALVULAR DISEASE, CHRONIC, DIASTOLIC: ICD-10-CM

## 2025-05-07 DIAGNOSIS — I38 HEART FAILURE DUE TO VALVULAR DISEASE, CHRONIC, DIASTOLIC: ICD-10-CM

## 2025-05-07 DIAGNOSIS — I35.0 AORTIC STENOSIS, SEVERE: ICD-10-CM

## 2025-05-07 LAB
AORTIC DIMENSIONLESS INDEX: 0.82 (DI)
ASCENDING AORTA: 3.3 CM
AV MEAN PRESS GRAD SYS DOP V1V2: 5.4 MMHG
AV VMAX SYS DOP: 141.5 CM/SEC
BH CV ECHO MEAS - AO MAX PG: 8 MMHG
BH CV ECHO MEAS - AO ROOT AREA (BSA CORRECTED): 1.3 CM2
BH CV ECHO MEAS - AO ROOT DIAM: 2.39 CM
BH CV ECHO MEAS - AO V2 VTI: 39.9 CM
BH CV ECHO MEAS - AVA(I,D): 2.28 CM2
BH CV ECHO MEAS - EDV(CUBED): 91.1 ML
BH CV ECHO MEAS - EDV(MOD-SP2): 69 ML
BH CV ECHO MEAS - EDV(MOD-SP4): 42 ML
BH CV ECHO MEAS - EF(MOD-SP2): 62.3 %
BH CV ECHO MEAS - EF(MOD-SP4): 57.1 %
BH CV ECHO MEAS - ESV(MOD-SP2): 26 ML
BH CV ECHO MEAS - ESV(MOD-SP4): 18 ML
BH CV ECHO MEAS - FS: 42 %
BH CV ECHO MEAS - IVS/LVPW: 1 CM
BH CV ECHO MEAS - IVSD: 0.8 CM
BH CV ECHO MEAS - LAT PEAK E' VEL: 11.2 CM/SEC
BH CV ECHO MEAS - LV DIASTOLIC VOL/BSA (35-75): 23.4 CM2
BH CV ECHO MEAS - LV MASS(C)D: 113.6 GRAMS
BH CV ECHO MEAS - LV MAX PG: 5.1 MMHG
BH CV ECHO MEAS - LV MEAN PG: 2.7 MMHG
BH CV ECHO MEAS - LV SYSTOLIC VOL/BSA (12-30): 10 CM2
BH CV ECHO MEAS - LV V1 MAX: 113.2 CM/SEC
BH CV ECHO MEAS - LV V1 VTI: 32.6 CM
BH CV ECHO MEAS - LVIDD: 4.5 CM
BH CV ECHO MEAS - LVIDS: 2.6 CM
BH CV ECHO MEAS - LVOT AREA: 2.8 CM2
BH CV ECHO MEAS - LVOT DIAM: 1.89 CM
BH CV ECHO MEAS - LVPWD: 0.8 CM
BH CV ECHO MEAS - MED PEAK E' VEL: 8.1 CM/SEC
BH CV ECHO MEAS - MR MAX PG: 74 MMHG
BH CV ECHO MEAS - MR MAX VEL: 430 CM/SEC
BH CV ECHO MEAS - MV A DUR: 0.16 SEC
BH CV ECHO MEAS - MV A MAX VEL: 32 CM/SEC
BH CV ECHO MEAS - MV DEC SLOPE: 595.3 CM/SEC2
BH CV ECHO MEAS - MV DEC TIME: 0.21 SEC
BH CV ECHO MEAS - MV E MAX VEL: 79.3 CM/SEC
BH CV ECHO MEAS - MV E/A: 2.48
BH CV ECHO MEAS - MV MAX PG: 5.3 MMHG
BH CV ECHO MEAS - MV MEAN PG: 1.13 MMHG
BH CV ECHO MEAS - MV P1/2T: 57.2 MSEC
BH CV ECHO MEAS - MV V2 VTI: 39.3 CM
BH CV ECHO MEAS - MVA(P1/2T): 3.8 CM2
BH CV ECHO MEAS - MVA(VTI): 2.32 CM2
BH CV ECHO MEAS - PA V2 MAX: 104.3 CM/SEC
BH CV ECHO MEAS - PULM A REVS DUR: 0.16 SEC
BH CV ECHO MEAS - PULM A REVS VEL: 19.7 CM/SEC
BH CV ECHO MEAS - PULM DIAS VEL: 67 CM/SEC
BH CV ECHO MEAS - PULM S/D: 0.74
BH CV ECHO MEAS - PULM SYS VEL: 49.3 CM/SEC
BH CV ECHO MEAS - QP/QS: 0.58
BH CV ECHO MEAS - RAP SYSTOLE: 3 MMHG
BH CV ECHO MEAS - RV MAX PG: 1.77 MMHG
BH CV ECHO MEAS - RV V1 MAX: 66.6 CM/SEC
BH CV ECHO MEAS - RV V1 VTI: 16 CM
BH CV ECHO MEAS - RVOT DIAM: 2.05 CM
BH CV ECHO MEAS - RVSP: 41 MMHG
BH CV ECHO MEAS - SV(LVOT): 90.9 ML
BH CV ECHO MEAS - SV(MOD-SP2): 43 ML
BH CV ECHO MEAS - SV(MOD-SP4): 24 ML
BH CV ECHO MEAS - SV(RVOT): 52.6 ML
BH CV ECHO MEAS - SVI(LVOT): 50.6 ML/M2
BH CV ECHO MEAS - SVI(MOD-SP2): 23.9 ML/M2
BH CV ECHO MEAS - SVI(MOD-SP4): 13.3 ML/M2
BH CV ECHO MEAS - TAPSE (>1.6): 1.57 CM
BH CV ECHO MEAS - TR MAX PG: 37.9 MMHG
BH CV ECHO MEAS - TR MAX VEL: 308 CM/SEC
BH CV ECHO MEASUREMENTS AVERAGE E/E' RATIO: 8.22
BH CV XLRA - RV BASE: 3 CM
BH CV XLRA - RV LENGTH: 6.8 CM
BH CV XLRA - RV MID: 2.7 CM
BH CV XLRA - TDI S': 9 CM/SEC
LV EF BIPLANE MOD: 58.5 %
SINUS: 2.1 CM
STJ: 2.9 CM

## 2025-05-07 PROCEDURE — 93306 TTE W/DOPPLER COMPLETE: CPT

## 2025-05-07 PROCEDURE — 1160F RVW MEDS BY RX/DR IN RCRD: CPT | Performed by: INTERNAL MEDICINE

## 2025-05-07 PROCEDURE — 93000 ELECTROCARDIOGRAM COMPLETE: CPT | Performed by: INTERNAL MEDICINE

## 2025-05-07 PROCEDURE — 99214 OFFICE O/P EST MOD 30 MIN: CPT | Performed by: INTERNAL MEDICINE

## 2025-05-07 PROCEDURE — 1159F MED LIST DOCD IN RCRD: CPT | Performed by: INTERNAL MEDICINE

## 2025-05-07 NOTE — PROGRESS NOTES
[facility] New Patient Office Note     Encounter Date:25  Patient:Brunilda Antony  :1934  MRN:[id number]    Referring Provider: Aaron Salinas MD    Consulted for: Aortic stenosis    Chief Complaint:   Chief Complaint   Patient presents with    1 year post TAVR     History of Presenting Illness:      Ms. Antony is a 89 y.o. woman with past medical history notable for nonrheumatic aortic valve stenosis status post TAVR, chronic systolic and diastolic congestive heart failure, paroxysmal atrial fibrillation not on anticoagulation due to bleeding risk, mixed hyperlipidemia, and pulmonary hypertension who presents to our office for scheduled follow-up after her recent TAVR a year ago.  Overall continues to do great from a cardiac perspective.      Review of Systems:  Review of Systems   Constitutional: Positive for malaise/fatigue.   HENT: Negative.     Eyes: Negative.    Cardiovascular: Negative.    Respiratory: Negative.     Endocrine: Negative.    Hematologic/Lymphatic: Negative.    Skin: Negative.    Musculoskeletal: Negative.    Gastrointestinal: Negative.    Genitourinary: Negative.    Neurological: Negative.    Psychiatric/Behavioral: Negative.     Allergic/Immunologic: Negative.        Current Outpatient Medications on File Prior to Visit   Medication Sig Dispense Refill    Accu-Chek FastClix Lancets misc Use to check glucose once daily .  DM/ e11.9 100 each 1    acetaminophen (TYLENOL) 325 MG tablet Take 2 tablets by mouth Every 4 (Four) Hours As Needed for Mild Pain or Fever (temperature greater than 101F). 30 tablet 1    amiodarone (PACERONE) 200 MG tablet Take 1 tablet by mouth Daily. 43 tablet 0    amLODIPine (NORVASC) 5 MG tablet Take 1 tablet by mouth Daily. 90 tablet 1    aspirin 81 MG EC tablet Take 1 tablet by mouth Daily. 30 tablet 1    Blood Glucose Monitoring Suppl (ACCU-CHEK HU PLUS) w/Device kit Use to check glucose once daily . DM2/E11.9 1 kit 0    empagliflozin (JARDIANCE)  10 MG tablet tablet Take 1 tablet by mouth Daily. 30 tablet 2    ferrous gluconate (FERGON) 324 MG tablet TAKE 1 TABLET BY MOUTH 5(FIVE) TIMES A WEEK WITH BREAKFAST. MONDAY THROUGH FRIDAY 60 tablet 2    furosemide (LASIX) 40 MG tablet Take 1 tablet by mouth Daily As Needed (leg swelling). 30 tablet 11    glucose blood (Accu-Chek Michelle Plus) test strip Use to check glucose once daily . DM/ e11.9 100 each 1    levothyroxine (SYNTHROID, LEVOTHROID) 88 MCG tablet TAKE 1 TABLET BY MOUTH EVERY DAY IN THE MORNING 90 tablet 1    metoprolol succinate XL (TOPROL-XL) 25 MG 24 hr tablet TAKE 1/2 TABLET BY MOUTH DAILY 45 tablet 1    nystatin-triamcinolone (MYCOLOG II) 793597-5.1 UNIT/GM-% cream Apply a pea size amount to affected area twice daily 30 g 1    pantoprazole (PROTONIX) 40 MG EC tablet TAKE 1 TABLET BY MOUTH 2 TIMES A DAY BEFORE MEALS. 180 tablet 1    pantoprazole (PROTONIX) 40 MG EC tablet Take 1 tablet by mouth 2 (Two) Times a Day Before Meals. 180 tablet 1    potassium chloride (KLOR-CON M20) 20 MEQ CR tablet Take 1 tablet by mouth Daily As Needed (take with lasix). 30 tablet 11    rOPINIRole (REQUIP) 0.25 MG tablet TAKE 1 TABLET BY MOUTH DAILY AT NIGHT 1 HOUR BEFORE BEDTIME (Patient taking differently: Take 2 tablets by mouth Every Night.) 90 tablet 1    rosuvastatin (CRESTOR) 10 MG tablet TAKE 1 TABLET BY MOUTH EVERY DAY AT NIGHT 90 tablet 1    valsartan (DIOVAN) 80 MG tablet Take 1 tablet by mouth 2 (Two) Times a Day. 60 tablet 11    vitamin B-12 (CYANOCOBALAMIN) 1000 MCG tablet Take 1 tablet by mouth Daily. 30 tablet 5    vitamin D (ERGOCALCIFEROL) 1.25 MG (56107 UT) capsule capsule TAKE 1 CAPSULE BY MOUTH EVERY 14 DAYS. 6 capsule 1    metoprolol succinate XL (TOPROL-XL) 25 MG 24 hr tablet Take 0.5 tablets by mouth Daily. 45 tablet 1    metroNIDAZOLE (FLAGYL) 250 MG tablet Take 1 tablet by mouth 2 (Two) Times a Day. 10 tablet 0    rOPINIRole (REQUIP) 0.25 MG tablet Take 1 tablet by mouth every night 1 hour  before bedtime. 90 tablet 1    rosuvastatin (CRESTOR) 10 MG tablet Take 1 tablet by mouth Daily at night 90 tablet 1    [DISCONTINUED] ferrous gluconate 324 (37.5 Fe) MG tablet tablet Take 1 tablet by mouth 5 (five) times a week with breakfast. Monday through Friday. 60 tablet 2    [DISCONTINUED] furosemide (LASIX) 40 MG tablet Take 1 tablet by mouth Daily As Needed for leg swelling 270 tablet 0     No current facility-administered medications on file prior to visit.       Allergies   Allergen Reactions    Digoxin Other (See Comments)     Digoxin toxicity     Atorvastatin Calcium Myalgia    Neosporin [Bacitracin-Polymyxin B] Swelling       Past Medical History:   Diagnosis Date    Allergic     Anemia     Aortic valve stenosis     Arthritis     Asthma     Atrial fibrillation     Cataract     CHF (congestive heart failure)     Cholelithiasis     Chronic kidney disease     FOLLOWED BY PCP ONLY    Clotting disorder     Diabetes mellitus     GERD (gastroesophageal reflux disease)     Heart murmur     History of COVID-19 12/2023    HOSPITALIZED WITH COVID PNEUMONIA    History of syncope     Hyperlipidemia     Hypertension     Hypothyroidism     Multiple falls     Pneumonia     Restless legs syndrome        Past Surgical History:   Procedure Laterality Date    ANKLE OPEN REDUCTION INTERNAL FIXATION Left 08/27/2023    Procedure: LEFT ANKLE OPEN REDUCTION INTERNAL FIXATION;  Surgeon: Tonny Walter II, MD;  Location: [facility];  Service: Orthopedics;  Laterality: Left;    AORTIC VALVE REPAIR/REPLACEMENT N/A 04/19/2024    Procedure: TRANSFEMORAL TRANSCATHETER AORTIC VALVE REPLACEMENT with intraoperative transthoracic echocardiogram and possible open surgical rescue;  Surgeon: Sukhjinder Brody MD;  Location: [facility];  Service: Cardiothoracic;  Laterality: N/A;    AORTIC VALVE REPAIR/REPLACEMENT N/A 04/19/2024    Procedure: Transfemoral Transcatheter Aortic Valve Replacement with intraoperative  transthoracic echocardiogram and possible open surgical rescue;  Surgeon: Orlando Cash MD;  Location: [facility] CVOR;  Service: Cardiovascular;  Laterality: N/A;    CARDIAC CATHETERIZATION N/A 03/29/2024    Procedure: Right and Left Heart Cath;  Surgeon: Orlando Cash MD;  Location: [facility] CATH INVASIVE LOCATION;  Service: Cardiovascular;  Laterality: N/A;    CARDIAC CATHETERIZATION N/A 03/29/2024    Procedure: Coronary angiography;  Surgeon: Orlando Cash MD;  Location: [facility] CATH INVASIVE LOCATION;  Service: Cardiovascular;  Laterality: N/A;    CARDIAC VALVE REPLACEMENT      CATARACT EXTRACTION EXTRACAPSULAR W/ INTRAOCULAR LENS IMPLANTATION Bilateral     CHOLECYSTECTOMY      CHOLECYSTECTOMY WITH INTRAOPERATIVE CHOLANGIOGRAM N/A 08/11/2023    Procedure: CHOLECYSTECTOMY LAPAROSCOPIC INTRAOPERATIVE CHOLANGIOGRAM;  Surgeon: Lorena Olivares MD;  Location: [facility] MAIN OR;  Service: General;  Laterality: N/A;    COLONOSCOPY N/A 08/26/2023    Procedure: COLONOSCOPY TO CECUM/TI;  Surgeon: Juan M Mckoy MD;  Location: [facility] ENDOSCOPY;  Service: Gastroenterology;  Laterality: N/A;  pre: ANEMIA, GI BLEED  post: FAIR PREP, HEMORRHOIDS    ENDOSCOPY N/A 08/25/2023    Procedure: ESOPHAGOGASTRODUODENOSCOPY;  Surgeon: Orlando Pang MD;  Location: [facility] ENDOSCOPY;  Service: Gastroenterology;  Laterality: N/A;  Pre - GI Bleed    ERCP N/A 08/12/2023    Procedure: ENDOSCOPIC RETROGRADE CHOLANGIOPANCREATOGRAPHY with sphincterotomy and balloon sweep;  Surgeon: Orlando Huang MD;  Location: [facility] ENDOSCOPY;  Service: Gastroenterology;  Laterality: N/A;  PRE/POST - cbd stones    FRACTURE SURGERY  8/27/2023    Broken Fibula    HARDWARE REMOVAL Left 10/02/2023    Procedure: LEFT ANKLE HARDWARE REMOVAL;  Surgeon: Tonny Walter II, MD;  Location: [facility] OR [facility];  Service: Orthopedics;  Laterality: Left;    KNEE ARTHROSCOPY Right     SKIN CANCER EXCISION Left 02/07/2019    Left [facility]  "History     Socioeconomic History    Marital status:      Spouse name: Neel    Number of children: 4   Tobacco Use    Smoking status: Former     Current packs/day: 0.00     Types: Cigarettes     Quit date: 1952     Years since quittin.0     Passive exposure: Past    Smokeless tobacco: Never    Tobacco comments:     2 PPD X 15 YEARS    Vaping Use    Vaping status: Never Used   Substance and Sexual Activity    Alcohol use: Not Currently    Drug use: No    Sexual activity: Not Currently     Partners: Male       Family History   Problem Relation Age of Onset    Hypertension Mother     Hypertension Father     Heart attack Father 55         at 55    Hyperlipidemia Father     Hyperlipidemia Sister     Hypertension Sister     Leukemia Sister     Coronary artery disease Brother         s/p cabg    Hypertension Brother     Breast cancer Daughter     Ovarian cancer Daughter     Colon cancer Neg Hx     Malig Hyperthermia Neg Hx        The following portions of the patient's history were reviewed and updated as appropriate: allergies, current medications, past family history, past medical history, past social history, past surgical history and problem list.       Objective:       Vitals:    25 1018   BP: 140/60   BP Location: Left arm   Patient Position: Sitting   Pulse: (!) 49   Weight: 74 kg (163 lb 3.2 oz)   Height: 162.6 cm (64.02\")       Body mass index is 28 kg/m².    Physical Exam:  Constitutional: Well appearing, Well-developed, No acute distress   HENT: Oropharynx clear and membrane moist  Eyes: Normal conjunctiva, no sclera icterus.  Neck: Supple, no carotid bruit bilaterally.  Cardiovascular: Regular and Bradycardic rate and rhythm, No Murmur, No bilateral lower extremity edema.  Pulmonary: Normal respiratory effort, normal lung sounds, no wheezing.  Neurological: Alert and orient x 3.   Skin: Warm, dry, no ecchymosis, no rash.  Psych: Appropriate mood and affect. Normal judgment and " insight.       Lab Results   Component Value Date     04/24/2025     10/29/2024    K 4.8 04/24/2025    K 4.4 10/29/2024     04/24/2025     10/29/2024    CO2 24.8 04/24/2025    CO2 27.1 10/29/2024    BUN 18 04/24/2025    BUN 23 10/29/2024    CREATININE 1.51 (H) 04/24/2025    CREATININE 1.50 (H) 10/29/2024    EGFRIFNONA 49 (L) 12/06/2021    EGFRIFNONA 52 (L) 09/09/2020    EGFRIFAFRI 57 (L) 12/06/2021    EGFRIFAFRI 62 09/09/2020    GLUCOSE 80 04/24/2025    GLUCOSE 129 (H) 10/29/2024    CALCIUM 9.4 04/24/2025    CALCIUM 9.2 10/29/2024    ALBUMIN 3.3 (L) 10/29/2024    ALBUMIN 4.1 09/09/2024    BILITOT 0.6 10/29/2024    BILITOT 0.6 09/09/2024    AST 29 10/29/2024    AST 25 09/09/2024    ALT 22 10/29/2024    ALT 20 09/09/2024     Lab Results   Component Value Date    WBC 10.30 10/29/2024    WBC 8.97 05/30/2024    HGB 12.9 10/29/2024    HGB 11.3 (L) 05/30/2024    HCT 40.4 10/29/2024    HCT 37.0 05/30/2024    MCV 91.0 10/29/2024    MCV 84.9 05/30/2024     10/29/2024     05/30/2024     Lab Results   Component Value Date    CHOL 180 09/09/2024    CHOL 157 06/20/2023    TRIG 164 (H) 09/09/2024    TRIG 114 06/20/2023    HDL 48 09/09/2024    HDL 45 06/20/2023     (H) 09/09/2024    LDL 91 06/20/2023     Lab Results   Component Value Date    PROBNP 942.0 04/12/2024    PROBNP 1,486.0 02/26/2024     Lab Results   Component Value Date    TROPONINT 45 (H) 12/16/2023     Lab Results   Component Value Date    TSH 2.840 03/03/2025    TSH 2.890 09/03/2024       ECG 12 Lead    Date/Time: 5/7/2025 1:23 PM  Performed by: Orlando Cash MD    Authorized by: Orlando Cash MD  Comparison: compared with previous ECG from 2/6/2025  Similar to previous ECG  Rhythm: sinus bradycardia           Echocardiogram 5/7/2025 images reviewed by myself:  Left ventricular systolic function is normal. Calculated left ventricular EF = 58.5%  Left ventricular diastolic function was normal.  There is a TAVR  valve present (26mm Medtronic Evolut FX per chart). Aortic valve mean pressure gradient is 5 mmHg, within normal limit.  No significant paravalvular leak noted.  Moderate mitral regurgitation, may be underestimated.  Moderate mitral valve regurgitation is present.  Estimated right ventricular systolic pressure from tricuspid regurgitation is mildly elevated (35-45 mmHg).  The right atrial cavity is mildly dilated.  Grossly normal LA and IVC size.  Possible interatrial shunt on color Doppler.    Echocardiogram 5/30/2024:  Left ventricular systolic function is normal. Calculated left ventricular EF = 61.7% Left ventricular ejection fraction appears to be 61 - 65%.  The following left ventricular wall segments are hypokinetic: apex hypokinetic.  Left ventricular diastolic function is consistent with (grade II w/high LAP) pseudonormalization.  Mildly reduced right ventricular systolic function noted.  The right ventricular cavity is mildly dilated.  The right atrial cavity is mildly  dilated.  Peak velocity of the flow distal to the aortic valve is 113.3 cm/s. Aortic valve mean pressure gradient is 3 mmHg. Aortic valve dimensionless index is 0.9 .  There is a TAVR valve present.  Normal prosthetic valve function.  Leaflets appear normal.  Severe mitral valve regurgitation is present.  Estimated right ventricular systolic pressure from tricuspid regurgitation is moderately elevated (45-55 mmHg).    Echocardiogram 4/20/2024 images reviewed by myself:  Left ventricular systolic function is normal. Calculated left ventricular EF = 62.1% Normal left ventricular cavity size and wall thickness noted. All left ventricular wall segments contract normally. Left ventricular diastolic function was indeterminate.  The left atrial cavity is moderately dilated.  There is mild anterior paravalvular regurgitation Aortic valve area is 1.09 cm2. Peak velocity of the flow distal to the aortic valve is 207.3 cm/s. Aortic valve maximum  pressure gradient is 17.2 mmHg. Aortic valve mean pressure gradient is 8.3 mmHg. Aortic valve dimensionless index is 0.50 . There is a 26 mm  MDT Evolute TAVR valve present. The aortic valve peak and mean gradients are within defined limits. The prosthetic aortic valve is not well visualized.  Mild to moderate tricuspid valve regurgitation is present. Estimated right ventricular systolic pressure from tricuspid regurgitation is markedly elevated (>55 mmHg). Calculated right ventricular systolic pressure from tricuspid regurgitation is 61 mmHg.    TAVR 4/19/2024:  Successful placement of 26 mm Medtronic evolute FX aortic valve bioprosthesis with no paravalvular leak.  Bedside echocardiogram identified mean gradient 5 mmHg and calculated valve area of 1.1 cm2    Cardiac Catheterization 3/29/2024:  Moderate to severe single-vessel coronary disease involving the proximal to mid LAD otherwise mild 30% mid circumflex segment stenoses and 30% RCA mid segment stenoses.  Borderline elevated LVEDP of 16 mmHg with a peak to peak gradient across the valve at approximately 40 mmHg on pullback assessment consistent with echocardiogram findings  Moderate pulmonary hypertension with a mean PA pressure of 39 mmHg in the setting of elevated wedge pressures unclear if were not getting good wedge tracings versus V wave    Echocardiogram 2/26/2024:  Left ventricular systolic function is normal. Calculated left ventricular EF = 65.3%  Left ventricular diastolic function is consistent with (grade III w/high LAP) reversible restrictive pattern.  The left atrial cavity is mildly dilated.  The right atrial cavity is mildly  dilated.  Severe aortic valve stenosis is present. Aortic valve area is 0.7 cm2.Peak velocity of the flow distal to the aortic valve is 414.6 cm/s. Aortic valve maximum pressure gradient is 69 mmHg. Aortic valve mean pressure gradient is 40 mmHg. Aortic valve dimensionless index is 0.3 .  Calculated right ventricular  systolic pressure from tricuspid regurgitation is 66 mmHg.    Echocardiogram 12/18/2023:  Left ventricular systolic function is normal. Calculated left ventricular EF = 64.4%  Left ventricular diastolic function was normal.  The left atrial cavity is moderately dilated.  Moderate aortic valve stenosis is present. Aortic valve area is 0.6 cm2.  Peak velocity of the flow distal to the aortic valve is 341 cm/s. Aortic valve maximum pressure gradient is 47 mmHg. Aortic valve mean pressure gradient is 27 mmHg. Aortic valve dimensionless index is 0.3 .  Estimated right ventricular systolic pressure from tricuspid regurgitation is mildly elevated (35-45 mmHg). Calculated right ventricular systolic pressure from tricuspid regurgitation is 36 mmHg.    Echocardiogram 8/8/2023:  Left ventricular systolic function is normal. Calculated left ventricular EF = 63.2%  Left ventricular diastolic function was normal.  There is calcification of the aortic valve mainly affecting the non-coronary, left coronary and right coronary cusp(s). There is thickening of the aortic valve. The aortic valve appears trileaflet. No significant aortic valve regurgitation is present. Moderate aortic valve stenosis is present. Aortic valve area is 0.87 cm2. Peak velocity of the flow distal to the aortic valve is 317.1 cm/s. Aortic valve maximum pressure gradient is 40.2 mmHg. Aortic valve mean  Calculated right ventricular systolic pressure from tricuspid regurgitation is 38 mmHg.    Echocardiogram 12/16/2022:  Left ventricular systolic function is low normal. Calculated left ventricular EF = 41.7% Left ventricular ejection fraction appears to be 41 - 45%. Normal left ventricular cavity size and wall thickness noted. There is left ventricular global hypokinesis noted. Left ventricular diastolic function was indeterminate. There is markedly abnormal septal motion that appears to correlate with respiration and suggests significant underlying lung disease  vs diastolic volume overload.  The right ventricular cavity is moderately dilated. Mildly reduced right ventricular systolic function noted.  Moderate mitral valve regurgitation is present.  Moderate tricuspid valve regurgitation is present. Estimated right ventricular systolic pressure from tricuspid regurgitation is moderately elevated (45-55 mmHg). Calculated right ventricular systolic pressure from tricuspid regurgitation is 46 mmHg.        Assessment:          Diagnosis Plan   1. Nonrheumatic aortic valve stenosis  ECG 12 Lead                 Plan:       Ms. Antony is a 89 y.o. woman with past medical history notable for nonrheumatic aortic valve stenosis status post TAVR, chronic systolic and diastolic congestive heart failure, paroxysmal atrial fibrillation not on anticoagulation due to bleeding risk, mixed hyperlipidemia, and pulmonary hypertension who presents for scheduled follow-up after recent TAVR.  Overall her valve looks excellent today and echocardiogram clinically she is doing well no changes needed from my and she will follow-up with her primary cardiologist as scheduled.      Nonrheumatic aortic valve stenosis/status post TAVR:  Status post implantation of Medtronic evolute FX aortic bioprosthetic valve 4/2024  Echocardiogram today shows normal valve function  NYHA class II symptoms  Continue aspirin 81 mg daily      Follow-up:  As needed with me and as scheduled with primary cardiologist       Thank you for allowing me to participate in the care of Brunilda Antony. Feel free to contact me directly with any further questions or concerns.    Orlando Cash MD  [facility]  05/07/25  13:25 EDT

## 2025-05-09 ENCOUNTER — OFFICE VISIT (OUTPATIENT)
Dept: INTERNAL MEDICINE | Age: OVER 89
End: 2025-05-09
Payer: MEDICARE

## 2025-05-09 VITALS
SYSTOLIC BLOOD PRESSURE: 110 MMHG | HEIGHT: 64 IN | WEIGHT: 163 LBS | OXYGEN SATURATION: 97 % | RESPIRATION RATE: 18 BRPM | HEART RATE: 52 BPM | DIASTOLIC BLOOD PRESSURE: 82 MMHG | BODY MASS INDEX: 27.83 KG/M2 | TEMPERATURE: 96.5 F

## 2025-05-09 DIAGNOSIS — N18.31 TYPE 2 DIABETES MELLITUS WITH STAGE 3A CHRONIC KIDNEY DISEASE, WITHOUT LONG-TERM CURRENT USE OF INSULIN: Primary | Chronic | ICD-10-CM

## 2025-05-09 DIAGNOSIS — G25.81 RLS (RESTLESS LEGS SYNDROME): ICD-10-CM

## 2025-05-09 DIAGNOSIS — I50.30 HEART FAILURE WITH PRESERVED EJECTION FRACTION, BORDERLINE, CLASS II: Chronic | ICD-10-CM

## 2025-05-09 DIAGNOSIS — E11.22 TYPE 2 DIABETES MELLITUS WITH STAGE 3A CHRONIC KIDNEY DISEASE, WITHOUT LONG-TERM CURRENT USE OF INSULIN: Primary | Chronic | ICD-10-CM

## 2025-05-09 DIAGNOSIS — E78.2 MIXED HYPERLIPIDEMIA: Chronic | ICD-10-CM

## 2025-05-09 DIAGNOSIS — I10 PRIMARY HYPERTENSION: Chronic | ICD-10-CM

## 2025-05-09 RX ORDER — ROPINIROLE 0.25 MG/1
TABLET, FILM COATED ORAL
Start: 2025-05-09

## 2025-05-09 NOTE — PROGRESS NOTES
J  U  N  O  H    K  I  M ,   M  D      I  N  T  E  R  N  A  L    M  E  D  I  C  I  N  E         ENCOUNTER DATE:  05/09/2025    Brunilda Antony / [age] y.o. / female    OFFICE VISIT ENCOUNTER       CHIEF COMPLAINT / REASON FOR OFFICE VISIT     Diabetes, Hypertension, and Hyperlipidemia      ASSESSMENT & PLAN     Problem List Items Addressed This Visit          High    Type 2 diabetes mellitus, without long-term current use of insulin - Primary (Chronic)    Overview   * Taken off metformin in the hospital due to worsening CKD (12/2022)    Continue Jardiance 10 mg daily (taking primarily for HF)         Relevant Medications    Blood Glucose Monitoring Suppl (ACCU-CHEK MICHELLE PLUS) w/Device kit    glucose blood (Accu-Chek Michelle Plus) test strip    Accu-Chek FastClix Lancets misc    empagliflozin (JARDIANCE) 10 MG tablet tablet       Medium    Hyperlipidemia (Chronic)    Overview   Continue rosuvastatin 10 mg daily         Relevant Medications    rosuvastatin (CRESTOR) 10 MG tablet    rosuvastatin (CRESTOR) 10 MG tablet    Hypertension (Chronic)    Current Assessment & Plan   BP Readings from Last 3 Encounters:   05/09/25 110/82   05/07/25 172/60   05/07/25 140/60      Blood pressure remains stable.  Continue current blood pressure/heart failure medications.  She was advised to take furosemide as instructed by her cardiologist as needed for increased swelling, weight gain or shortness of breath.         Relevant Medications    furosemide (LASIX) 40 MG tablet    metoprolol succinate XL (TOPROL-XL) 25 MG 24 hr tablet    amLODIPine (NORVASC) 5 MG tablet    valsartan (DIOVAN) 80 MG tablet    metoprolol succinate XL (TOPROL-XL) 25 MG 24 hr tablet    Heart failure with preserved ejection fraction, borderline, class II (Chronic)    Current Assessment & Plan   Wt Readings from Last 3 Encounters:   05/09/25 73.9 kg (163 lb)   05/07/25 74.4 kg (164 lb)   05/07/25 74 kg (163 lb 3.2 oz)     Body mass index is 27.96 kg/m².    "  Weight is stable but has increased edema of lower legs/ankles and mild increase in dyspnea.   Take furosemide 40 mg for few days and see how she does.   She was advised to take furosemide as needed as advised by her cardiologist (monitor weight, edema, and shortness of breath).          Relevant Medications    metoprolol succinate XL (TOPROL-XL) 25 MG 24 hr tablet    amLODIPine (NORVASC) 5 MG tablet    empagliflozin (JARDIANCE) 10 MG tablet tablet    metoprolol succinate XL (TOPROL-XL) 25 MG 24 hr tablet     Other Visit Diagnoses         RLS (restless legs syndrome)        Relevant Medications    rOPINIRole (REQUIP) 0.25 MG tablet          No orders of the defined types were placed in this encounter.    New Medications Ordered This Visit   Medications    rOPINIRole (REQUIP) 0.25 MG tablet     Sig: Take 1 at night and another at bedtime.       SUMMARY/DISCUSSION        TOTAL TIME OF ENCOUNTER:      Next Appointment with me: 9/5/2025    Return for Next scheduled followup for AWV.      VITAL SIGNS     Vitals:    05/09/25 1053   BP: 110/82   BP Location: Left arm   Patient Position: Sitting   Cuff Size: Adult   Pulse: 52   Resp: 18   Temp: 96.5 °F (35.8 °C)   TempSrc: Temporal   SpO2: 97%   Weight: 73.9 kg (163 lb)   Height: 162.6 cm (64.02\")       BP Readings from Last 3 Encounters:   05/09/25 110/82   05/07/25 172/60   05/07/25 140/60     Wt Readings from Last 3 Encounters:   05/09/25 73.9 kg (163 lb)   05/07/25 74.4 kg (164 lb)   05/07/25 74 kg (163 lb 3.2 oz)     Body mass index is 27.96 kg/m².    Blood pressure readings recorded on patient flowsheet:       No data to display                MEDICATIONS AT THE TIME OF OFFICE VISIT     Current Outpatient Medications on File Prior to Visit   Medication Sig    Accu-Chek FastClix Lancets misc Use to check glucose once daily .  DM/ e11.9    acetaminophen (TYLENOL) 325 MG tablet Take 2 tablets by mouth Every 4 (Four) Hours As Needed for Mild Pain or Fever (temperature " greater than 101F).    amiodarone (PACERONE) 200 MG tablet Take 1 tablet by mouth Daily.    amLODIPine (NORVASC) 5 MG tablet Take 1 tablet by mouth Daily.    aspirin 81 MG EC tablet Take 1 tablet by mouth Daily.    Blood Glucose Monitoring Suppl (ACCU-CHEK HU PLUS) w/Device kit Use to check glucose once daily . DM2/E11.9    empagliflozin (JARDIANCE) 10 MG tablet tablet Take 1 tablet by mouth Daily.    ferrous gluconate (FERGON) 324 MG tablet TAKE 1 TABLET BY MOUTH 5(FIVE) TIMES A WEEK WITH BREAKFAST. MONDAY THROUGH FRIDAY    furosemide (LASIX) 40 MG tablet Take 1 tablet by mouth Daily As Needed (leg swelling).    glucose blood (Accu-Chek Hu Plus) test strip Use to check glucose once daily . DM/ e11.9    levothyroxine (SYNTHROID, LEVOTHROID) 88 MCG tablet TAKE 1 TABLET BY MOUTH EVERY DAY IN THE MORNING    metoprolol succinate XL (TOPROL-XL) 25 MG 24 hr tablet TAKE 1/2 TABLET BY MOUTH DAILY    metoprolol succinate XL (TOPROL-XL) 25 MG 24 hr tablet Take 0.5 tablets by mouth Daily.    metroNIDAZOLE (FLAGYL) 250 MG tablet Take 1 tablet by mouth 2 (Two) Times a Day.    nystatin-triamcinolone (MYCOLOG II) 691892-7.1 UNIT/GM-% cream Apply a pea size amount to affected area twice daily    pantoprazole (PROTONIX) 40 MG EC tablet TAKE 1 TABLET BY MOUTH 2 TIMES A DAY BEFORE MEALS.    pantoprazole (PROTONIX) 40 MG EC tablet Take 1 tablet by mouth 2 (Two) Times a Day Before Meals.    potassium chloride (KLOR-CON M20) 20 MEQ CR tablet Take 1 tablet by mouth Daily As Needed (take with lasix).    rosuvastatin (CRESTOR) 10 MG tablet TAKE 1 TABLET BY MOUTH EVERY DAY AT NIGHT    rosuvastatin (CRESTOR) 10 MG tablet Take 1 tablet by mouth Daily at night    valsartan (DIOVAN) 80 MG tablet Take 1 tablet by mouth 2 (Two) Times a Day.    vitamin B-12 (CYANOCOBALAMIN) 1000 MCG tablet Take 1 tablet by mouth Daily.    vitamin D (ERGOCALCIFEROL) 1.25 MG (15527 UT) capsule capsule TAKE 1 CAPSULE BY MOUTH EVERY 14 DAYS.    [DISCONTINUED]  rOPINIRole (REQUIP) 0.25 MG tablet TAKE 1 TABLET BY MOUTH DAILY AT NIGHT 1 HOUR BEFORE BEDTIME (Patient taking differently: Take 2 tablets by mouth Every Night.)    [DISCONTINUED] rOPINIRole (REQUIP) 0.25 MG tablet Take 1 tablet by mouth every night 1 hour before bedtime.     No current facility-administered medications on file prior to visit.         HISTORY OF PRESENT ILLNESS     She denies any acute problems or changes.  She has noted some increased swelling of her lower extremities as well as mild increased dyspnea with exertion.  Denies chest pains or palpitations.  She was recently seen by her cardiologist and no changes were made to her medications.  She has not been taking the furosemide but she was advised to take it as needed for increased signs of heart failure.  Hypertension remains well-controlled on multidrug regimen.  For diabetes she is taking Jardiance 10 mg but this is primarily for heart failure.  She is on stable dose of levothyroxine 88 mcg for hypothyroidism.  She takes rosuvastatin 10 mg for hyperlipidemia.  She is on pantoprazole 40 mg twice daily for GERD.  She is on ropinirole 0.25 mg 1 tablet at night and then a second tablet at bedtime.  This appears to control her RLS symptoms adequately.    Lab Results   Component Value Date    HGBA1C 5.90 (H) 01/08/2025    HGBA1C 6.50 (H) 09/09/2024    HGBA1C 5.80 (H) 04/12/2024    CREATININE 1.51 (H) 04/24/2025     (H) 09/09/2024    MALBCRERATIO  09/09/2024      Comment:      Unable to calculate     Blood sugar readings recorded on patient's flowsheet:       No data to display               73.9 kg (163 lb)    Patient Care Team:  Pérez Wheat MD as PCP - General (Internal Medicine)  DANIS Lara MD as Consulting Physician (Ophthalmology)  Marilyn Jolly MD as Consulting Physician (Dermatology)  Emily Randolph MD as Surgeon (Orthopedic Surgery)  Aaron Salinas MD as Cardiologist (Cardiology)  Kaleigh Fagan APRN as Nurse Practitioner  (Nurse Practitioner)  Winston Cunningham MD as Consulting Physician (Cardiology)  Orlando Cash MD as Consulting Physician (Cardiology)  Juan M Schmid DPM as Consulting Physician (Podiatry)    REVIEW OF SYSTEMS           PHYSICAL EXAMINATION     Physical Exam  Alert with normal thought and judgment.   Cardiovascular: Normal rate, regular rhythm.   Pulm/Chest: Effort normal, breath sounds normal.   1-2 + edema of ankles bilaterally       REVIEWED DATA     Labs:       Lab Results   Component Value Date     04/24/2025    K 4.8 04/24/2025    CALCIUM 9.4 04/24/2025    AST 29 10/29/2024    ALT 22 10/29/2024    BUN 18 04/24/2025    CREATININE 1.51 (H) 04/24/2025    CREATININE 1.50 (H) 10/29/2024    CREATININE 1.40 (H) 09/26/2024    EGFR 32.7 (L) 04/24/2025     Lab Results   Component Value Date    HGBA1C 5.90 (H) 01/08/2025    HGBA1C 6.50 (H) 09/09/2024    HGBA1C 5.80 (H) 04/12/2024     Lab Results   Component Value Date    MALBCRERATIO  09/09/2024      Comment:      Unable to calculate     Lab Results   Component Value Date     (H) 09/09/2024    LDL 91 06/20/2023    LDL 50 12/06/2021    HDL 48 09/09/2024    HDL 45 06/20/2023    TRIG 164 (H) 09/09/2024    TRIG 114 06/20/2023     Lab Results   Component Value Date    TSH 2.840 03/03/2025    TSH 2.890 09/03/2024    TSH 1.800 02/26/2024    FREET4 1.81 (H) 09/03/2024    FREET4 1.83 (H) 02/12/2024    FREET4 1.37 11/08/2023     Lab Results   Component Value Date    WBC 10.30 10/29/2024    HGB 12.9 10/29/2024     10/29/2024         Imaging:           Medical Tests:           Summary of old records / correspondence / consultant report:           Request outside records:

## 2025-05-09 NOTE — ASSESSMENT & PLAN NOTE
Wt Readings from Last 3 Encounters:   05/09/25 73.9 kg (163 lb)   05/07/25 74.4 kg (164 lb)   05/07/25 74 kg (163 lb 3.2 oz)     Body mass index is 27.96 kg/m².     Weight is stable but has increased edema of lower legs/ankles and mild increase in dyspnea.   Take furosemide 40 mg for few days and see how she does.   She was advised to take furosemide as needed as advised by her cardiologist (monitor weight, edema, and shortness of breath).

## 2025-05-09 NOTE — ASSESSMENT & PLAN NOTE
BP Readings from Last 3 Encounters:   05/09/25 110/82   05/07/25 172/60   05/07/25 140/60      Blood pressure remains stable.  Continue current blood pressure/heart failure medications.  She was advised to take furosemide as instructed by her cardiologist as needed for increased swelling, weight gain or shortness of breath.

## 2025-05-14 DIAGNOSIS — T46.2X1A HYPOTHYROIDISM DUE TO AMIODARONE: ICD-10-CM

## 2025-05-14 DIAGNOSIS — I50.33 ACUTE ON CHRONIC DIASTOLIC CHF (CONGESTIVE HEART FAILURE): ICD-10-CM

## 2025-05-14 DIAGNOSIS — N18.31 STAGE 3A CHRONIC KIDNEY DISEASE (CKD): Chronic | ICD-10-CM

## 2025-05-14 DIAGNOSIS — I10 PRIMARY HYPERTENSION: Chronic | ICD-10-CM

## 2025-05-14 DIAGNOSIS — E03.2 HYPOTHYROIDISM DUE TO AMIODARONE: ICD-10-CM

## 2025-05-16 RX ORDER — LEVOTHYROXINE SODIUM 88 UG/1
88 TABLET ORAL EVERY MORNING
Qty: 90 TABLET | Refills: 1 | Status: SHIPPED | OUTPATIENT
Start: 2025-05-16

## 2025-05-16 RX ORDER — LOSARTAN POTASSIUM 25 MG/1
25 TABLET ORAL 2 TIMES DAILY
Qty: 180 TABLET | Refills: 1 | OUTPATIENT
Start: 2025-05-16

## 2025-05-23 RX ORDER — AMIODARONE HYDROCHLORIDE 200 MG/1
200 TABLET ORAL DAILY
Qty: 43 TABLET | Refills: 0 | Status: SHIPPED | OUTPATIENT
Start: 2025-05-23

## 2025-06-02 DIAGNOSIS — E55.9 VITAMIN D DEFICIENCY: ICD-10-CM

## 2025-06-03 RX ORDER — ERGOCALCIFEROL 1.25 MG/1
50000 CAPSULE, LIQUID FILLED ORAL
Qty: 6 CAPSULE | Refills: 1 | Status: SHIPPED | OUTPATIENT
Start: 2025-06-03

## 2025-06-23 DIAGNOSIS — G25.81 RLS (RESTLESS LEGS SYNDROME): ICD-10-CM

## 2025-06-23 RX ORDER — ROPINIROLE 0.25 MG/1
0.25 TABLET, FILM COATED ORAL 2 TIMES DAILY
Qty: 180 TABLET | Refills: 1 | Status: SHIPPED | OUTPATIENT
Start: 2025-06-23

## 2025-07-07 RX ORDER — AMIODARONE HYDROCHLORIDE 200 MG/1
200 TABLET ORAL DAILY
Qty: 43 TABLET | Refills: 0 | Status: SHIPPED | OUTPATIENT
Start: 2025-07-07

## 2025-07-09 DIAGNOSIS — N18.31 TYPE 2 DIABETES MELLITUS WITH STAGE 3A CHRONIC KIDNEY DISEASE, WITHOUT LONG-TERM CURRENT USE OF INSULIN: Chronic | ICD-10-CM

## 2025-07-09 DIAGNOSIS — I50.30 HEART FAILURE WITH PRESERVED EJECTION FRACTION, BORDERLINE, CLASS II: Chronic | ICD-10-CM

## 2025-07-09 DIAGNOSIS — N18.31 STAGE 3A CHRONIC KIDNEY DISEASE (CKD): Chronic | ICD-10-CM

## 2025-07-09 DIAGNOSIS — E11.22 TYPE 2 DIABETES MELLITUS WITH STAGE 3A CHRONIC KIDNEY DISEASE, WITHOUT LONG-TERM CURRENT USE OF INSULIN: Chronic | ICD-10-CM

## 2025-08-07 ENCOUNTER — OFFICE VISIT (OUTPATIENT)
Age: OVER 89
End: 2025-08-07
Payer: MEDICARE

## 2025-08-07 VITALS
DIASTOLIC BLOOD PRESSURE: 62 MMHG | SYSTOLIC BLOOD PRESSURE: 146 MMHG | BODY MASS INDEX: 27.83 KG/M2 | HEIGHT: 64 IN | HEART RATE: 49 BPM | WEIGHT: 163 LBS

## 2025-08-07 DIAGNOSIS — Z95.2 HISTORY OF TRANSCATHETER AORTIC VALVE REPLACEMENT (TAVR): ICD-10-CM

## 2025-08-07 DIAGNOSIS — I48.19 ATRIAL FIBRILLATION, PERSISTENT: Primary | ICD-10-CM

## 2025-08-07 PROCEDURE — 99213 OFFICE O/P EST LOW 20 MIN: CPT

## 2025-08-07 PROCEDURE — 93000 ELECTROCARDIOGRAM COMPLETE: CPT

## 2025-08-14 RX ORDER — AMLODIPINE BESYLATE 5 MG/1
5 TABLET ORAL DAILY
Qty: 90 TABLET | Refills: 1 | Status: SHIPPED | OUTPATIENT
Start: 2025-08-14

## 2025-08-18 RX ORDER — AMIODARONE HYDROCHLORIDE 200 MG/1
200 TABLET ORAL DAILY
Qty: 43 TABLET | Refills: 0 | Status: SHIPPED | OUTPATIENT
Start: 2025-08-18

## 2025-08-25 RX ORDER — PANTOPRAZOLE SODIUM 40 MG/1
40 TABLET, DELAYED RELEASE ORAL
Qty: 180 TABLET | Refills: 1 | Status: SHIPPED | OUTPATIENT
Start: 2025-08-25

## 2025-08-25 RX ORDER — METOPROLOL SUCCINATE 25 MG/1
12.5 TABLET, EXTENDED RELEASE ORAL DAILY
Qty: 45 TABLET | Refills: 1 | Status: SHIPPED | OUTPATIENT
Start: 2025-08-25

## 2025-08-26 RX ORDER — ROSUVASTATIN CALCIUM 10 MG/1
10 TABLET, COATED ORAL DAILY
Qty: 90 TABLET | Refills: 1 | Status: SHIPPED | OUTPATIENT
Start: 2025-08-26

## (undated) DEVICE — TR BAND RADIAL ARTERY COMPRESSION DEVICE: Brand: TR BAND

## (undated) DEVICE — PK CATH CARD 40

## (undated) DEVICE — RADIFOCUS GLIDEWIRE: Brand: GLIDEWIRE

## (undated) DEVICE — DGW .035 FC J3MM 260CM TEF: Brand: EMERALD

## (undated) DEVICE — KT MANIFLD CARDIAC

## (undated) DEVICE — RADIFOCUS OPTITORQUE ANGIOGRAPHIC CATHETER: Brand: OPTITORQUE

## (undated) DEVICE — GW HITORQUE/BAL MID/WT J W/HCOAT .014 3X190CM

## (undated) DEVICE — GLIDESHEATH BASIC HYDROPHILIC COATED INTRODUCER SHEATH: Brand: GLIDESHEATH

## (undated) DEVICE — BALN PRESS WEDGE 5F 110CM

## (undated) DEVICE — GLIDESHEATH SLENDER STAINLESS STEEL KIT: Brand: GLIDESHEATH SLENDER